# Patient Record
Sex: FEMALE | Employment: UNEMPLOYED | URBAN - METROPOLITAN AREA
[De-identification: names, ages, dates, MRNs, and addresses within clinical notes are randomized per-mention and may not be internally consistent; named-entity substitution may affect disease eponyms.]

---

## 2018-11-06 ENCOUNTER — APPOINTMENT (OUTPATIENT)
Dept: GENERAL RADIOLOGY | Age: 63
End: 2018-11-06
Attending: EMERGENCY MEDICINE
Payer: MEDICARE

## 2018-11-06 ENCOUNTER — HOSPITAL ENCOUNTER (EMERGENCY)
Age: 63
Discharge: HOME | End: 2018-11-06
Attending: EMERGENCY MEDICINE
Payer: MEDICARE

## 2018-11-06 VITALS
HEART RATE: 65 BPM | DIASTOLIC BLOOD PRESSURE: 63 MMHG | HEIGHT: 61 IN | RESPIRATION RATE: 18 BRPM | BODY MASS INDEX: 33.04 KG/M2 | TEMPERATURE: 98.1 F | WEIGHT: 175 LBS | OXYGEN SATURATION: 97 % | SYSTOLIC BLOOD PRESSURE: 140 MMHG

## 2018-11-06 DIAGNOSIS — R03.0 ELEVATED BLOOD PRESSURE READING: ICD-10-CM

## 2018-11-06 DIAGNOSIS — R07.89 ATYPICAL CHEST PAIN: ICD-10-CM

## 2018-11-06 DIAGNOSIS — M25.521 RIGHT ELBOW PAIN: Primary | ICD-10-CM

## 2018-11-06 LAB
ALBUMIN SERPL-MCNC: 4 G/DL (ref 3.4–5)
ALBUMIN/GLOB SERPL: 1.4 {RATIO} (ref 0.8–1.7)
ALP SERPL-CCNC: 109 U/L (ref 45–117)
ALT SERPL-CCNC: 46 U/L (ref 13–56)
ANION GAP SERPL CALC-SCNC: 9 MMOL/L (ref 3–18)
AST SERPL-CCNC: 47 U/L (ref 15–37)
BASOPHILS # BLD: 0 K/UL (ref 0–0.1)
BASOPHILS NFR BLD: 0 % (ref 0–2)
BILIRUB SERPL-MCNC: 0.4 MG/DL (ref 0.2–1)
BNP SERPL-MCNC: 20 PG/ML (ref 0–900)
BUN SERPL-MCNC: 14 MG/DL (ref 7–18)
BUN/CREAT SERPL: 19 (ref 12–20)
CALCIUM SERPL-MCNC: 9.3 MG/DL (ref 8.5–10.1)
CHLORIDE SERPL-SCNC: 103 MMOL/L (ref 100–108)
CO2 SERPL-SCNC: 28 MMOL/L (ref 21–32)
CREAT SERPL-MCNC: 0.72 MG/DL (ref 0.6–1.3)
DIFFERENTIAL METHOD BLD: NORMAL
EOSINOPHIL # BLD: 0.2 K/UL (ref 0–0.4)
EOSINOPHIL NFR BLD: 4 % (ref 0–5)
ERYTHROCYTE [DISTWIDTH] IN BLOOD BY AUTOMATED COUNT: 12.3 % (ref 11.6–14.5)
GLOBULIN SER CALC-MCNC: 2.9 G/DL (ref 2–4)
GLUCOSE SERPL-MCNC: 213 MG/DL (ref 74–99)
HCT VFR BLD AUTO: 42.2 % (ref 35–45)
HGB BLD-MCNC: 13.8 G/DL (ref 12–16)
LYMPHOCYTES # BLD: 2.2 K/UL (ref 0.9–3.6)
LYMPHOCYTES NFR BLD: 36 % (ref 21–52)
MCH RBC QN AUTO: 30.7 PG (ref 24–34)
MCHC RBC AUTO-ENTMCNC: 32.7 G/DL (ref 31–37)
MCV RBC AUTO: 94 FL (ref 74–97)
MONOCYTES # BLD: 0.5 K/UL (ref 0.05–1.2)
MONOCYTES NFR BLD: 9 % (ref 3–10)
NEUTS SEG # BLD: 3.1 K/UL (ref 1.8–8)
NEUTS SEG NFR BLD: 51 % (ref 40–73)
PLATELET # BLD AUTO: 242 K/UL (ref 135–420)
PMV BLD AUTO: 10.4 FL (ref 9.2–11.8)
POTASSIUM SERPL-SCNC: 3.6 MMOL/L (ref 3.5–5.5)
PROT SERPL-MCNC: 6.9 G/DL (ref 6.4–8.2)
RBC # BLD AUTO: 4.49 M/UL (ref 4.2–5.3)
SODIUM SERPL-SCNC: 140 MMOL/L (ref 136–145)
TROPONIN I SERPL-MCNC: <0.02 NG/ML (ref 0–0.04)
WBC # BLD AUTO: 6.1 K/UL (ref 4.6–13.2)

## 2018-11-06 PROCEDURE — 74011250637 HC RX REV CODE- 250/637: Performed by: PHYSICIAN ASSISTANT

## 2018-11-06 PROCEDURE — 99285 EMERGENCY DEPT VISIT HI MDM: CPT

## 2018-11-06 PROCEDURE — 85025 COMPLETE CBC W/AUTO DIFF WBC: CPT | Performed by: EMERGENCY MEDICINE

## 2018-11-06 PROCEDURE — 83880 ASSAY OF NATRIURETIC PEPTIDE: CPT | Performed by: EMERGENCY MEDICINE

## 2018-11-06 PROCEDURE — 93005 ELECTROCARDIOGRAM TRACING: CPT

## 2018-11-06 PROCEDURE — 84484 ASSAY OF TROPONIN QUANT: CPT | Performed by: EMERGENCY MEDICINE

## 2018-11-06 PROCEDURE — 94762 N-INVAS EAR/PLS OXIMTRY CONT: CPT

## 2018-11-06 PROCEDURE — 71046 X-RAY EXAM CHEST 2 VIEWS: CPT

## 2018-11-06 PROCEDURE — 80053 COMPREHEN METABOLIC PANEL: CPT | Performed by: EMERGENCY MEDICINE

## 2018-11-06 RX ORDER — GUAIFENESIN 100 MG/5ML
81 LIQUID (ML) ORAL
Status: COMPLETED | OUTPATIENT
Start: 2018-11-06 | End: 2018-11-06

## 2018-11-06 RX ADMIN — ASPIRIN 81 MG CHEWABLE TABLET 81 MG: 81 TABLET CHEWABLE at 21:40

## 2018-11-07 LAB
ATRIAL RATE: 91 BPM
CALCULATED P AXIS, ECG09: 40 DEGREES
CALCULATED R AXIS, ECG10: -3 DEGREES
CALCULATED T AXIS, ECG11: 63 DEGREES
DIAGNOSIS, 93000: NORMAL
P-R INTERVAL, ECG05: 142 MS
Q-T INTERVAL, ECG07: 362 MS
QRS DURATION, ECG06: 82 MS
QTC CALCULATION (BEZET), ECG08: 445 MS
VENTRICULAR RATE, ECG03: 91 BPM

## 2018-11-07 NOTE — ED PROVIDER NOTES
EMERGENCY DEPARTMENT HISTORY AND PHYSICAL EXAM 
 
9:38 PM 
 
 
Date: 11/6/2018 Patient Name: Alejandrina Alarcon History of Presenting Illness Chief Complaint Patient presents with  Arm Pain  
  right arm  Chest Pain  
  right sided chest pain  Headache History Provided By: Patient Chief Complaint: R elbow pain, chest pain Duration:  Hours Timing:  Acute Location: R medial aspect of elbow, R chest wall Quality: Aching Severity: Moderate Modifying Factors: worse with movement of arm Associated Symptoms: diffuse, mild HA Additional History (Context): Alejandrina Alarcon is a 61 y.o. female with hx of fibromyalgia, DM, HTN who presents with c/o R medial elbow pain radiating into R chest wall x 2 hours. Pt notes the pain is aching in nature and worse with movement. Denies diaphoresis, dyspnea, n/v, leg edema, orthopnea. Denies hx of cardiac disease, smoking hx. Notes father with hx of CAD. Also notes gradual onset of mild diffuse HA. Denies dizziness, changes in vision, numbness/tingling, weakness. Did not take any medication PTA. Takes baby ASA qday. PCP: Ravinder Mckinnon MD 
 
Current Outpatient Medications Medication Sig Dispense Refill  METFORMIN HCL (METFORMIN PO) Take  by mouth.  LEVOTHYROXINE SODIUM (LEVOTHYROXINE PO) Take  by mouth.  LISINOPRIL PO Take  by mouth.  GLYBURIDE by Does Not Apply route. Past History Past Medical History: 
Past Medical History:  
Diagnosis Date  Diabetes ([address]  Fibromyalgia   
 HTN (hypertension)  Hypothyroid Past Surgical History: 
History reviewed. No pertinent surgical history. Family History: 
History reviewed. No pertinent family history. Social History: 
Social History Tobacco Use  Smoking status: Never Smoker  Smokeless tobacco: Never Used Substance Use Topics  Alcohol use: No  
  Frequency: Never  Drug use: No  
 
 
Allergies: Allergies Allergen Reactions  Compazine [Prochlorperazine Edisylate] Angioedema  Aspirin Hives Review of Systems Review of Systems Constitutional: Negative for chills and fever. Respiratory: Negative for shortness of breath. Cardiovascular: Positive for chest pain. Gastrointestinal: Negative for abdominal pain, nausea and vomiting. Musculoskeletal: Positive for myalgias. Skin: Negative for rash. Neurological: Negative for weakness. All other systems reviewed and are negative. Physical Exam  
 
Visit Vitals /68 Pulse 82 Temp 98.1 °F (36.7 °C) Resp 15 Ht 5' 1\" (1.549 m) Wt 79.4 kg (175 lb) SpO2 97% BMI 33.07 kg/m² Physical Exam  
Constitutional: She is oriented to person, place, and time. She appears well-developed and well-nourished. No distress. HENT:  
Head: Normocephalic and atraumatic. Mouth/Throat: Oropharynx is clear and moist.  
Neck: Normal range of motion. Neck supple. Cardiovascular: Normal rate, regular rhythm, normal heart sounds and intact distal pulses. Exam reveals no gallop and no friction rub. No murmur heard. Pulmonary/Chest: Effort normal and breath sounds normal. No respiratory distress. She has no wheezes. She has no rales. She exhibits no tenderness. Abdominal: Soft. She exhibits no distension. There is no tenderness. There is no rebound and no guarding. Musculoskeletal: Normal range of motion. She exhibits no edema. Right shoulder: Normal.  
     Right elbow: She exhibits normal range of motion, no swelling, no effusion, no deformity and no laceration. Tenderness ( ) found. Medial epicondyle tenderness noted.  strength 5/5, radial pulse 2+ Neurological: She is alert and oriented to person, place, and time. No cranial nerve deficit. Coordination normal.  
Skin: Skin is warm. No rash noted. She is not diaphoretic. Nursing note and vitals reviewed. Diagnostic Study Results Labs - 
 Recent Results (from the past 12 hour(s)) EKG, 12 LEAD, INITIAL Collection Time: 11/06/18  9:20 PM  
Result Value Ref Range Ventricular Rate 91 BPM  
 Atrial Rate 91 BPM  
 P-R Interval 142 ms QRS Duration 82 ms Q-T Interval 362 ms QTC Calculation (Bezet) 445 ms Calculated P Axis 40 degrees Calculated R Axis -3 degrees Calculated T Axis 63 degrees Diagnosis Normal sinus rhythm Normal ECG When compared with ECG of 09-APR-2009 13:42, 
Borderline criteria for Anterior infarct are no longer present Borderline criteria for Inferior infarct are no longer present CBC WITH AUTOMATED DIFF Collection Time: 11/06/18  9:30 PM  
Result Value Ref Range WBC 6.1 4.6 - 13.2 K/uL  
 RBC 4.49 4.20 - 5.30 M/uL  
 HGB 13.8 12.0 - 16.0 g/dL HCT 42.2 35.0 - 45.0 % MCV 94.0 74.0 - 97.0 FL  
 MCH 30.7 24.0 - 34.0 PG  
 MCHC 32.7 31.0 - 37.0 g/dL  
 RDW 12.3 11.6 - 14.5 % PLATELET 620 849 - 666 K/uL MPV 10.4 9.2 - 11.8 FL  
 NEUTROPHILS 51 40 - 73 % LYMPHOCYTES 36 21 - 52 % MONOCYTES 9 3 - 10 % EOSINOPHILS 4 0 - 5 % BASOPHILS 0 0 - 2 %  
 ABS. NEUTROPHILS 3.1 1.8 - 8.0 K/UL  
 ABS. LYMPHOCYTES 2.2 0.9 - 3.6 K/UL  
 ABS. MONOCYTES 0.5 0.05 - 1.2 K/UL  
 ABS. EOSINOPHILS 0.2 0.0 - 0.4 K/UL  
 ABS. BASOPHILS 0.0 0.0 - 0.1 K/UL  
 DF AUTOMATED METABOLIC PANEL, COMPREHENSIVE Collection Time: 11/06/18  9:30 PM  
Result Value Ref Range Sodium 140 136 - 145 mmol/L Potassium 3.6 3.5 - 5.5 mmol/L Chloride 103 100 - 108 mmol/L  
 CO2 28 21 - 32 mmol/L Anion gap 9 3.0 - 18 mmol/L Glucose 213 (H) 74 - 99 mg/dL BUN 14 7.0 - 18 MG/DL Creatinine 0.72 0.6 - 1.3 MG/DL  
 BUN/Creatinine ratio 19 12 - 20 GFR est AA >60 >60 ml/min/1.73m2 GFR est non-AA >60 >60 ml/min/1.73m2 Calcium 9.3 8.5 - 10.1 MG/DL Bilirubin, total 0.4 0.2 - 1.0 MG/DL  
 ALT (SGPT) 46 13 - 56 U/L  
 AST (SGOT) 47 (H) 15 - 37 U/L Alk.  phosphatase 109 45 - 117 U/L  
 Protein, total 6.9 6.4 - 8.2 g/dL Albumin 4.0 3.4 - 5.0 g/dL Globulin 2.9 2.0 - 4.0 g/dL A-G Ratio 1.4 0.8 - 1.7 NT-PRO BNP Collection Time: 11/06/18  9:30 PM  
Result Value Ref Range NT pro-BNP 20 0 - 900 PG/ML  
TROPONIN I Collection Time: 11/06/18  9:30 PM  
Result Value Ref Range Troponin-I, Qt. <0.02 0.0 - 0.045 NG/ML Radiologic Studies -  
XR CHEST PA LAT    (Results Pending) RADIOLOGY FINDINGS Chest  X-ray shows no acute process Pending review by Radiologist 
Recorded by Dalia Mitchell PA-C Medical Decision Making I am the first provider for this patient. I reviewed the vital signs, available nursing notes, past medical history, past surgical history, family history and social history. Vital Signs-Reviewed the patient's vital signs. Pulse Oximetry Analysis -  100 on room air Cardiac Monitor: 
Rate: 98 Rhythm:  Normal sinus rhythm EKG: Interpreted by the EP. Time Interpreted: 2121 Rate: 91 
 Rhythm: normal sinus rhythm Interpretation: no STEMI, no ischemic changes Records Reviewed: Nursing Notes and Old Medical Records (Time of Review: 9:38 PM) ED Course: Progress Notes, Reevaluation, and Consults: 
10:09 PM: Pt resting comfortably, talking with family member. 10:12 PM Reviewed results with patient and family. Discussed need for close outpatient follow-up. Discussed strict return precautions, including shortness of breath, diaphoresis, or any other medical concerns. Provider Notes (Medical Decision Making): 62 yo F with hx of DM and HTN who presents due to R medial elbow pain with radiation into R chest wall x hours. Pain reproducible with palpation. EKG normal sinus rhythm, troponin negative. HEART score 3, low risk of MACE. Does not seem consistent with aortic pathology or PE. Pt looks well, no distress. Stable for d/c with close outpatient follow-up. Diagnosis Clinical Impression: 1. Right elbow pain 2. Atypical chest pain 3. Elevated blood pressure reading Disposition: home Follow-up Information Follow up With Specialties Details Why Contact Info [facility] EMERGENCY DEPT Emergency Medicine  If symptoms worsen [address] 
[phone] Maria Esther Mckenna MD Internal Medicine In 2 days  [address] [facility] [address] 
[phone] Melissa Ruelas,  Cardiology Schedule an appointment as soon as possible for a visit cardiologist  [address] [facility] [address] [facility] 
[phone] Medication List  
  
ASK your doctor about these medications GLYBURIDE LEVOTHYROXINE PO 
  
LISINOPRIL PO 
  
METFORMIN PO

## 2018-11-07 NOTE — DISCHARGE INSTRUCTIONS
Elevated Blood Pressure: Care Instructions  Your Care Instructions    Blood pressure is a measure of how hard the blood pushes against the walls of your arteries. It's normal for blood pressure to go up and down throughout the day. But if it stays up over time, you have high blood pressure. Two numbers tell you your blood pressure. The first number is the systolic pressure. It shows how hard the blood pushes when your heart is pumping. The second number is the diastolic pressure. It shows how hard the blood pushes between heartbeats, when your heart is relaxed and filling with blood. An ideal blood pressure in adults is less than 120/80 (say \"120 over 80\"). High blood pressure is 140/90 or higher. You have high blood pressure if your top number is 140 or higher or your bottom number is 90 or higher, or both. The main test for high blood pressure is simple, fast, and painless. To diagnose high blood pressure, your doctor will test your blood pressure at different times. After testing your blood pressure, your doctor may ask you to test it again when you are home. If you are diagnosed with high blood pressure, you can work with your doctor to make a long-term plan to manage it. Follow-up care is a key part of your treatment and safety. Be sure to make and go to all appointments, and call your doctor if you are having problems. It's also a good idea to know your test results and keep a list of the medicines you take. How can you care for yourself at home? · Do not smoke. Smoking increases your risk for heart attack and stroke. If you need help quitting, talk to your doctor about stop-smoking programs and medicines. These can increase your chances of quitting for good. · Stay at a healthy weight. · Try to limit how much sodium you eat to less than 2,300 milligrams (mg) a day. Your doctor may ask you to try to eat less than 1,500 mg a day. · Be physically active.  Get at least 30 minutes of exercise on most days of the week. Walking is a good choice. You also may want to do other activities, such as running, swimming, cycling, or playing tennis or team sports. · Avoid or limit alcohol. Talk to your doctor about whether you can drink any alcohol. · Eat plenty of fruits, vegetables, and low-fat dairy products. Eat less saturated and total fats. · Learn how to check your blood pressure at home. When should you call for help? Call your doctor now or seek immediate medical care if:  ? · Your blood pressure is much higher than normal (such as 180/110 or higher). ? · You think high blood pressure is causing symptoms such as:  ¨ Severe headache. ¨ Blurry vision. ? Watch closely for changes in your health, and be sure to contact your doctor if:  ? · You do not get better as expected. Where can you learn more? Go to http://dedraSayHello LLCdebby.info/. Enter F962 in the search box to learn more about \"Elevated Blood Pressure: Care Instructions. \"  Current as of: September 21, 2016  Content Version: 11.4  © 1885-8138 [employer]. Care instructions adapted under license by [employer] (which disclaims liability or warranty for this information). If you have questions about a medical condition or this instruction, always ask your healthcare professional. [address] any warranty or liability for your use of this information. Chest Pain: Care Instructions  Your Care Instructions    There are many things that can cause chest pain. Some are not serious and will get better on their own in a few days. But some kinds of chest pain need more testing and treatment. Your doctor may have recommended a follow-up visit in the next 8 to 12 hours. If you are not getting better, you may need more tests or treatment. Even though your doctor has released you, you still need to watch for any problems.  The doctor carefully checked you, but sometimes problems can develop later. If you have new symptoms or if your symptoms do not get better, get medical care right away. If you have worse or different chest pain or pressure that lasts more than 5 minutes or you passed out (lost consciousness), call 911 or seek other emergency help right away. A medical visit is only one step in your treatment. Even if you feel better, you still need to do what your doctor recommends, such as going to all suggested follow-up appointments and taking medicines exactly as directed. This will help you recover and help prevent future problems. How can you care for yourself at home? · Rest until you feel better. · Take your medicine exactly as prescribed. Call your doctor if you think you are having a problem with your medicine. · Do not drive after taking a prescription pain medicine. When should you call for help? Call 911 if:    · You passed out (lost consciousness).     · You have severe difficulty breathing.     · You have symptoms of a heart attack. These may include:  ? Chest pain or pressure, or a strange feeling in your chest.  ? Sweating. ? Shortness of breath. ? Nausea or vomiting. ? Pain, pressure, or a strange feeling in your back, neck, jaw, or upper belly or in one or both shoulders or arms. ? Lightheadedness or sudden weakness. ? A fast or irregular heartbeat. After you call 911, the  may tell you to chew 1 adult-strength or 2 to 4 low-dose aspirin. Wait for an ambulance. Do not try to drive yourself.    Call your doctor today if:    · You have any trouble breathing.     · Your chest pain gets worse.     · You are dizzy or lightheaded, or you feel like you may faint.     · You are not getting better as expected.     · You are having new or different chest pain. Where can you learn more? Go to http://dedra-debby.info/. Enter A120 in the search box to learn more about \"Chest Pain: Care Instructions. \"  Current as of: November 20, 2017  Content Version: 11.8  © 2697-3644 [employer]. Care instructions adapted under license by [employer] (which disclaims liability or warranty for this information). If you have questions about a medical condition or this instruction, always ask your healthcare professional. [address] any warranty or liability for your use of this information.

## 2018-11-07 NOTE — ED TRIAGE NOTES
Pt brought self to ED, ambulatory in triage for c/o right sided arm pain that shoots up into her chest that began 2 hours ago. Pt stating \"I have fibromyalgia so I have pain all the time\". Denies N/V/D, states \"I don't feel real real short of breath but I find myself taking deep breaths\".

## 2018-11-13 ENCOUNTER — OFFICE VISIT (OUTPATIENT)
Dept: CARDIOLOGY CLINIC | Age: 63
End: 2018-11-13

## 2018-11-13 VITALS
DIASTOLIC BLOOD PRESSURE: 80 MMHG | OXYGEN SATURATION: 98 % | HEART RATE: 86 BPM | SYSTOLIC BLOOD PRESSURE: 136 MMHG | WEIGHT: 178 LBS | BODY MASS INDEX: 33.63 KG/M2

## 2018-11-13 DIAGNOSIS — R07.89 OTHER CHEST PAIN: Primary | ICD-10-CM

## 2018-11-13 DIAGNOSIS — R00.2 PALPITATIONS: ICD-10-CM

## 2018-11-13 RX ORDER — NITROGLYCERIN 0.4 MG/1
0.4 TABLET SUBLINGUAL
Qty: 25 TAB | Refills: 3 | Status: SHIPPED | OUTPATIENT
Start: 2018-11-13

## 2018-11-13 NOTE — PATIENT INSTRUCTIONS
Negro French will call to schedule your testing within 24 hours. If you do not hear from her, then please call her directly at [phone].  Testing is done in [address] on [address].

## 2018-11-13 NOTE — PROGRESS NOTES
1. Have you been to the ER, urgent care clinic since your last visit? Hospitalized since your last visit? No  
 
2. Have you seen or consulted any other health care providers outside of the [address] [place] [address] since your last visit? Include any pap smears or colon screening.  No

## 2018-11-16 ENCOUNTER — HOSPITAL ENCOUNTER (OUTPATIENT)
Dept: NON INVASIVE DIAGNOSTICS | Age: 63
Discharge: HOME | End: 2018-11-16
Attending: INTERNAL MEDICINE
Payer: MEDICARE

## 2018-11-16 ENCOUNTER — HOSPITAL ENCOUNTER (OUTPATIENT)
Dept: NUCLEAR MEDICINE | Age: 63
Discharge: HOME | End: 2018-11-16
Attending: INTERNAL MEDICINE
Payer: MEDICARE

## 2018-11-16 VITALS
DIASTOLIC BLOOD PRESSURE: 84 MMHG | HEIGHT: 61 IN | BODY MASS INDEX: 33.7 KG/M2 | SYSTOLIC BLOOD PRESSURE: 157 MMHG | WEIGHT: 178.5 LBS

## 2018-11-16 DIAGNOSIS — R00.2 PALPITATIONS: ICD-10-CM

## 2018-11-16 DIAGNOSIS — R07.89 OTHER CHEST PAIN: ICD-10-CM

## 2018-11-16 LAB
ECHO PULMONARY ARTERY SYSTOLIC PRESSURE (PASP): 28 MMHG
STRESS BASELINE HR: 70 BPM
STRESS ESTIMATED WORKLOAD: 9.7 METS
STRESS EXERCISE DUR MIN: NORMAL
STRESS PEAK DIAS BP: 86 MMHG
STRESS PEAK SYS BP: 189 MMHG
STRESS PERCENT HR ACHIEVED: 106 %
STRESS POST PEAK HR: 141 BPM
STRESS RATE PRESSURE PRODUCT: NORMAL BPM*MMHG
STRESS ST DEPRESSION: 0 MM
STRESS ST ELEVATION: 0 MM
STRESS TARGET HR: 133 BPM

## 2018-11-16 PROCEDURE — 93017 CV STRESS TEST TRACING ONLY: CPT

## 2018-11-16 PROCEDURE — A9500 TC99M SESTAMIBI: HCPCS

## 2018-11-16 PROCEDURE — 93306 TTE W/DOPPLER COMPLETE: CPT

## 2018-11-16 PROCEDURE — 93226 XTRNL ECG REC<48 HR SCAN A/R: CPT

## 2018-12-18 ENCOUNTER — OFFICE VISIT (OUTPATIENT)
Dept: CARDIOLOGY CLINIC | Age: 63
End: 2018-12-18

## 2018-12-18 VITALS
OXYGEN SATURATION: 97 % | SYSTOLIC BLOOD PRESSURE: 135 MMHG | BODY MASS INDEX: 33.82 KG/M2 | DIASTOLIC BLOOD PRESSURE: 78 MMHG | HEART RATE: 98 BPM | WEIGHT: 179 LBS

## 2018-12-18 DIAGNOSIS — R07.89 OTHER CHEST PAIN: Primary | ICD-10-CM

## 2018-12-18 NOTE — PROGRESS NOTES
1. Have you been to the ER, urgent care clinic since your last visit? Hospitalized since your last visit? No     2. Have you seen or consulted any other health care providers outside of the [address] [place] [address] since your last visit? Include any pap smears or colon screening.  No

## 2018-12-18 NOTE — PROGRESS NOTES
[facility]    Ms. Calli Thayer is a 61 y.o. female with a history of diabetes, hypertension, hypothyroidism and fibromyalgia. Ms. Calli Thayer was asked to come see me for the evaluation of chest pain and palpitations initially  Underwent cardiac testing  No new symptoms since last time  No exertional symptoms. Denies any nausea, vomiting, abdominal pain, fever, chills, sputum production. No hematuria or other bleeding complaints    Past Medical History:   Diagnosis Date    Diabetes ([address]     Fibromyalgia     HTN (hypertension)     Hypothyroid     Obesity          No past surgical history on file. Current Outpatient Medications   Medication Sig    nitroglycerin (NITROSTAT) 0.4 mg SL tablet 1 Tab by SubLINGual route every five (5) minutes as needed for Chest Pain. Up to 3 doses.  METFORMIN HCL (METFORMIN PO) Take 1,000 mg by mouth two (2) times a day.  LEVOTHYROXINE SODIUM (LEVOTHYROXINE PO) Take 100 mcg by mouth daily.  LISINOPRIL PO Take 10 mg by mouth daily. No current facility-administered medications for this visit. Allergies and Sensitivities:  Allergies   Allergen Reactions    Compazine [Prochlorperazine Edisylate] Angioedema    Aspirin Hives       Family History:  No family history on file. Social History:  Social History     Tobacco Use    Smoking status: Never Smoker    Smokeless tobacco: Never Used   Substance Use Topics    Alcohol use: No     Frequency: Never    Drug use: No     She  reports that  has never smoked. she has never used smokeless tobacco.  She  reports that she does not drink alcohol. Review of Systems:  Cardiac symptoms as noted above in HPI. All others negative. Denies fatigue, malaise, skin rash, joint pain, blurring vision, photophobia, neck pain, hemoptysis, chronic cough, nausea, vomiting, hematuria, burning micturition, BRBPR, chronic headaches.     Physical Exam:  BP Readings from Last 3 Encounters:   12/18/18 135/78   11/16/18 157/84   11/13/18 136/80         Pulse Readings from Last 3 Encounters:   12/18/18 98   11/13/18 86   11/06/18 65          Wt Readings from Last 3 Encounters:   12/18/18 179 lb (81.2 kg)   11/16/18 178 lb 8 oz (81 kg)   11/13/18 178 lb (80.7 kg)       Constitutional: Oriented to person, place, and time. HENT: Head: Normocephalic and atraumatic  Neck: No JVD present. Cardiovascular: Regular rhythm. No murmur, gallop or rubs appreciated  Lung: Breath sounds normal. No respiratory distress. No ronchi or rales appreciated  Abdominal: No tenderness. No rebound and no guarding. Musculoskeletal: There is no lower extremity edema. No cynosis      Review of Data  LABS:   Lab Results   Component Value Date/Time    Sodium 140 11/06/2018 09:30 PM    Potassium 3.6 11/06/2018 09:30 PM    Chloride 103 11/06/2018 09:30 PM    CO2 28 11/06/2018 09:30 PM    Glucose 213 (H) 11/06/2018 09:30 PM    BUN 14 11/06/2018 09:30 PM    Creatinine 0.72 11/06/2018 09:30 PM     No flowsheet data found. Lab Results   Component Value Date/Time    ALT (SGPT) 46 11/06/2018 09:30 PM     Lab Results   Component Value Date/Time    Hemoglobin A1c 7.8 (H) 10/01/2013 02:01 PM       EKG (11/18) Sinus rhythm at 91 bpm. Normal EKG. ECHO (11/18)  Left Ventricle Normal cavity size and wall thickness. There is low normal systolic function. The estimated ejection fraction is 51 - 55%. Visually measured ejection fraction. No regional wall motion abnormality noted. Slight septal bounce noted. There is age-appropriate left ventricular diastolic function. Left Atrium Normal cavity size. Right Ventricle Normal cavity size and global systolic function. Right Atrium Normal size. Aortic Valve No stenosis. Mild aortic valve sclerosis with no significant stenosis. Trace aortic valve regurgitation. Mitral Valve Normal valve structure and no stenosis. Trace regurgitation.    Tricuspid Valve Normal valve structure and no stenosis. Mild tricuspid valve regurgitation. Pulmonary arterial systolic pressure is 28 mmHg. There is no evidence of pulmonary hypertension. Pulmonic Valve The pulmonic valve was not well visualized. No stenosis. Trace regurgitation. Aorta Normal aortic root, ascending aortic, and aortic arch. HOLTER (11/18)  [place] was in Sinus Rhythm. The average heart rate, excluding ectopy, was 78 BPM with a minimum of 51 BPM at 07:48 D3 and a maximum of 123 BPM at 17:22 D1. Heart beats, including ectopy, totaled [id number] beats. VENTRICULAR ECTOPICS totaled 2 averaging 0.0 per hour with 2 single, 0 paired, 0 trigeminy and 0 R on T. There were no SUPRAVENTRICULAR ECTOPICS found.     Interpretation:  1. The rhythm was sinus. 2. NJ and QRS are within normal limits. 3. (2) Single VEs. 4. Patient diary was submitted symptom noted, no patient triggered events noted. Patient reported \"Chest pain, neck pain. .. \". Reported strips showed sinus rhythm with HR less than 105 bpm.     STRESS TEST (11/18)  · Baseline ECG: Normal sinus rhythm. · Low risk Duke treadmill score. · Negative stress electrocardiogram.  · Gated SPECT: Left ventricular function post-stress was normal. Calculated ejection fraction is 66%. · Left ventricular perfusion is probably normal.  · Myocardial perfusion imaging defect 1: There is a defect that is small in size present in the apical location(s) that is more pronounced with rest than stress imaging. The defect appears to be an artifact caused by breast attenuation. · There was no convincing evidence of significant reversible defect to suggest on going major ischemia. · Myocardial perfusion imaging supports a low risk stress test.    IMPRESSION & PLAN:  Ms. Jericho Esquivel is a 61year old female with a history of diabetes, hypertension, obesity and other medical problems. Chest pain:    Likely non-cardiac etiology  Cardiac work up echo, stress, low risk in 11/18.   Reassured patient and . Hypertension:  BP today 135/78 mm Hg. Continue same. Diabetes:  Goal Hgb A1c less than 7 is recommended from a cardiovascular standpoint. She is on Metformin. I am going to defer this management to PCP. Obesity: Max weight 220 lbs. Now down to 178 lbs with diet and exercise. Continue same. Importance of diet and exercise was discussed with patient. This plan was discussed with patient who is in agreement. Thank you for allowing me to participate in patient care. Please feel free to call me if you have any question or concern. Edna Flores MD  Please note: This document has been produced using voice recognition software. Unrecognized errors in transcription may be present.

## 2019-02-14 NOTE — PROGRESS NOTES
"CC: Back pain.    HPI:   Jamilah presents today with the following.    1. Acute right-sided low back pain with right-sided sciatica  Presents with approximately 5 days of low back pain with some mild radiation of the right leg.  She denies any weakness in the extremity no loss of bowel or bladder no foot drop.  She has no fevers or chills no true flank pain.  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  Denies any previous significant back issues.      Patient Active Problem List    Diagnosis Date Noted   • Constipation 11/20/2018   • Gastroesophageal reflux disease 11/20/2018   • Oropharyngeal dysphagia 11/20/2018   • Allergic rhinitis 09/26/2016   • Pure hypercholesterolemia 08/23/2016   • Carpal tunnel syndrome        Current Outpatient Prescriptions   Medication Sig Dispense Refill   • docusate sodium (COLACE) 100 MG Cap Take 1 Cap by mouth 2 times a day as needed for Constipation. 60 Cap 2   • naproxen (NAPROSYN) 500 MG Tab Take 500 mg by mouth 2 times a day, with meals.     • polyethylene glycol 3350 (MIRALAX) Powder 0.5 tbsp PO with water QD PRn constipation (Patient not taking: Reported on 2/13/2019) 1 Bottle 11   • omeprazole (PRILOSEC) 20 MG delayed-release capsule Take 1 Cap by mouth every morning before breakfast. (Patient not taking: Reported on 2/13/2019) 90 Cap 3   • fluticasone (FLONASE) 50 MCG/ACT nasal spray Spray 1 Spray in nose PRN.     • naproxen (ALEVE) 220 MG tablet Take 440 mg by mouth as needed.       No current facility-administered medications for this visit.          Allergies as of 02/13/2019   • (No Known Allergies)        ROS: Denies Chest pain, SOB, LE edema.    /66 (BP Location: Right arm, Patient Position: Sitting)   Pulse 60   Temp 36.3 °C (97.3 °F)   Ht 1.626 m (5' 4\")   Wt 68.5 kg (151 lb)   LMP 03/01/2015   SpO2 99%   BMI 25.92 kg/m²     Physical Exam:  Gen:         Alert and oriented, No apparent " [facility] Ms. Fernando Norris is a 61year old female with a history of diabetes, hypertension, hypothyroidism and fibromyalgia. Ms. Fernando Norris was asked to come see me for the evaluation of chest pain and palpitations. Ms. Fernando Norris denies any prior history of myocardial infarction or congestive heart failure. For the last one year she has been experiencing on and off chest pressure and sensation randomly. This happens probably once a week or once every other week. She describes this sensation as a pressure sensation in the center of the chest, sometimes radiating to the right side of the jaw. This lasts usually less than 30 minutes. It feels like somebody is squeezing her chest. This is random and sometimes happens at rest, sometimes with exertion. She does have mild dyspnea on moderate exertion, especially after climbing three or four flights of stairs, which has remained stable. A few days ago she had to go to the emergency department with right sided arm and right sided chest discomfort with some right arm radiation, however she also has fibromyalgia. She did not have any left sided pain. She denies palpitations almost on a daily basis where she feels like her heart is racing out of the blue. She does not have any dizziness, presyncope or syncope. It resolves itself after a few minutes. She denies any syncopal episodes. Denies any nausea, vomiting, abdominal pain, fever, chills, sputum production. No hematuria or other bleeding complaints Past Medical History:  
Diagnosis Date  Diabetes ([facility] [address]  Fibromyalgia   
 HTN (hypertension)  Hypothyroid No past surgical history on file. Current Outpatient Medications Medication Sig  METFORMIN HCL (METFORMIN PO) Take 1,000 mg by mouth two (2) times a day.  LEVOTHYROXINE SODIUM (LEVOTHYROXINE PO) Take 100 mcg by mouth daily. distress.  Neck:        No Lymphadenopathy or Bruits.  Lungs:     Clear to auscultation bilaterally  CV:          Regular rate and rhythm. No murmurs, rubs or gallops.               Ext:          No clubbing, cyanosis, edema.      Assessment and Plan.   50 y.o. female with the following issues.    1. Acute right-sided low back pain with right-sided sciatica  No obvious alarm symptomology have recommended naproxen twice daily for the next 7 days rest have put on light duty and physical therapy.  If not improving follow-up with PCP.  - REFERRAL TO PHYSICAL THERAPY Reason for Therapy: Eval/Treat/Report        LISINOPRIL PO Take 10 mg by mouth daily. No current facility-administered medications for this visit. Allergies and Sensitivities: 
Allergies Allergen Reactions  Compazine [Prochlorperazine Edisylate] Angioedema  Aspirin Hives Family History: No family history on file. Social History: 
Social History Tobacco Use  Smoking status: Never Smoker  Smokeless tobacco: Never Used Substance Use Topics  Alcohol use: No  
  Frequency: Never  Drug use: No  
 
She  reports that  has never smoked. she has never used smokeless tobacco.  She  reports that she does not drink alcohol. Review of Systems: 
Cardiac symptoms as noted above in HPI. All others negative. Denies fatigue, malaise, skin rash, joint pain, blurring vision, photophobia, neck pain, hemoptysis, chronic cough, nausea, vomiting, hematuria, burning micturition, BRBPR, chronic headaches. Physical Exam: 
BP Readings from Last 3 Encounters:  
11/13/18 136/80  
11/06/18 140/63  
12/11/13 118/79 Pulse Readings from Last 3 Encounters:  
11/13/18 86  
11/06/18 65  
12/11/13 86 Wt Readings from Last 3 Encounters:  
11/13/18 178 lb (80.7 kg) 11/06/18 175 lb (79.4 kg) 12/11/13 190 lb (86.2 kg) Constitutional: Oriented to person, place, and time. HENT: Head: Normocephalic and atraumatic. Eyes: Conjunctivae and extraocular motions are normal.  
Neck: No JVD present. Carotid bruit is not appreciated. Cardiovascular: Regular rhythm. No murmur, gallop or rubs appreciated Lung: Breath sounds normal. No respiratory distress. No ronchi or rales appreciated Abdominal: No tenderness. No rebound and no guarding. Musculoskeletal: There is no lower extremity edema. No cynosis Lymphadenopathy:  No cervical or supraclavicular adenopathy appriciated. Neurological: No gross motor deficit noted. Skin: No visible skin rash noted. No Ear discharge noted Psychiatric: Normal mood and affect. Good distal pulse Some reproducible CP on right side of chest 
 
Review of Data LABS:  
Lab Results Component Value Date/Time Sodium 140 11/06/2018 09:30 PM  
 Potassium 3.6 11/06/2018 09:30 PM  
 Chloride 103 11/06/2018 09:30 PM  
 CO2 28 11/06/2018 09:30 PM  
 Glucose 213 (H) 11/06/2018 09:30 PM  
 BUN 14 11/06/2018 09:30 PM  
 Creatinine 0.72 11/06/2018 09:30 PM  
 
No flowsheet data found. Lab Results Component Value Date/Time ALT (SGPT) 46 11/06/2018 09:30 PM  
 
Lab Results Component Value Date/Time Hemoglobin A1c 7.8 (H) 10/01/2013 02:01 PM  
 
 
EKG (11/18) Sinus rhythm at 91 bpm. Normal EKG. ECHO 
 
STRESS TEST (EST, PHARM, NUC, ECHO etc) IMPRESSION & PLAN: 
Ms. Bird Lopez is a 61year old female with a history of diabetes, hypertension, obesity and other medical problems. Chest pain:  Ms. Bird Lopez has been experiencing some chest pain as mentioned above in  HPI for the last one year. This happens probably once a week. She also has some reproducible right sided chest pain. Considering her risk factors of diabetes, obesity, hypertension and premature coronary artery disease in the father at age 54, I believe that she should undergo risk stratification. She is already taking aspirin 81 mg OTC. Sublingual nitroglycerin will be provided. Nuclear stress test will be ordered to rule out any underlying ischemia. She was advised to go to the emergency department if she has any chest pain not resolved with nitroglycerin. I advised her to avoid any exertion until further cardiac workup is done. Hypertension: This has been happening for several months. This is happening almost once daily. Holter monitor for 48 hours will be ordered. Her thyroid status is being managed by PCP. Currently she is on Synthroid. An echocardiogram has been ordered because of her chest pain and palpitations.  
 
Diabetes:  Goal Hgb A1c less than 7 is recommended from a cardiovascular standpoint. She is on Metformin. I am going to defer this management to PCP. Hypertension:  Currently she is on Lisinopril 10 mg daily. Her blood pressure today is 136/86 mmHg. An echocardiogram has been ordered to rule out hypertensive cardiovascular heart disease. Obesity: Max weight 220 lbs. Now down to 178 lbs with diet and exercise. Importance of diet and exercise was discussed with patient. This plan was discussed with patient who is in agreement. Thank you for allowing me to participate in patient care. Please feel free to call me if you have any question or concern. Esther Dominguez MD 
Please note: This document has been produced using voice recognition software. Unrecognized errors in transcription may be present.

## 2019-09-19 ENCOUNTER — OFFICE VISIT (OUTPATIENT)
Dept: CARDIOLOGY CLINIC | Age: 64
End: 2019-09-19

## 2019-09-19 VITALS
WEIGHT: 170 LBS | OXYGEN SATURATION: 98 % | BODY MASS INDEX: 32.12 KG/M2 | HEART RATE: 81 BPM | DIASTOLIC BLOOD PRESSURE: 81 MMHG | SYSTOLIC BLOOD PRESSURE: 142 MMHG

## 2019-09-19 DIAGNOSIS — I10 ESSENTIAL HYPERTENSION WITH GOAL BLOOD PRESSURE LESS THAN 140/90: ICD-10-CM

## 2019-09-19 DIAGNOSIS — R00.2 PALPITATIONS: Primary | ICD-10-CM

## 2019-09-19 RX ORDER — DULAGLUTIDE 1.5 MG/.5ML
INJECTION, SOLUTION SUBCUTANEOUS
Refills: 3 | COMMUNITY
Start: 2019-09-07 | End: 2020-07-02 | Stop reason: ALTCHOICE

## 2019-09-19 NOTE — PROGRESS NOTES
[facility]    Ms. Connie Shaffer is a 59 y.o. female with a history of diabetes, hypertension, hypothyroidism and fibromyalgia. Ms. Connie Shaffer is here today for follow-up appointment  She has been started on new diabetes medication, Trulicity. Since starting this medication patient has experienced some more than usual fatigue. She does not report any symptoms to suggest angina or heart failure. She is able to perform activity of daily living without any complaint. She denies any palpitation, presyncope or syncope  Denies any nausea, vomiting, abdominal pain, fever, chills, sputum production. No hematuria or other bleeding complaints    Past Medical History:   Diagnosis Date    Diabetes ([facility] [address]     Fibromyalgia     HTN (hypertension)     Hypothyroid     Obesity          No past surgical history on file. Current Outpatient Medications   Medication Sig    TRULICITY 1.5 LR/2.3 mL sub-q pen INJECT 1.5 MG BENEATH THE SKIN ONCE A WEEK    nitroglycerin (NITROSTAT) 0.4 mg SL tablet 1 Tab by SubLINGual route every five (5) minutes as needed for Chest Pain. Up to 3 doses.  LEVOTHYROXINE SODIUM (LEVOTHYROXINE PO) Take 100 mcg by mouth daily.  LISINOPRIL PO Take 10 mg by mouth daily. No current facility-administered medications for this visit. Allergies and Sensitivities:  Allergies   Allergen Reactions    Compazine [Prochlorperazine Edisylate] Angioedema    Aspirin Hives       Family History:  No family history on file. Social History:  Social History     Tobacco Use    Smoking status: Never Smoker    Smokeless tobacco: Never Used   Substance Use Topics    Alcohol use: No     Frequency: Never    Drug use: No     She  reports that she has never smoked. She has never used smokeless tobacco.  She  reports that she does not drink alcohol. Review of Systems:  Cardiac symptoms as noted above in HPI. All others negative.   Denies fatigue, malaise, skin rash, joint pain, blurring vision, photophobia, neck pain, hemoptysis, chronic cough, nausea, vomiting, hematuria, burning micturition, BRBPR, chronic headaches. Physical Exam:  BP Readings from Last 3 Encounters:   09/19/19 142/81   12/18/18 135/78   11/16/18 157/84         Pulse Readings from Last 3 Encounters:   09/19/19 81   12/18/18 98   11/13/18 86          Wt Readings from Last 3 Encounters:   09/19/19 170 lb (77.1 kg)   12/18/18 179 lb (81.2 kg)   11/16/18 178 lb 8 oz (81 kg)       Constitutional: Oriented to person, place, and time. HENT: Head: Normocephalic and atraumatic  Neck: No JVD present. Cardiovascular: Regular rhythm. No murmur, gallop or rubs appreciated  Lung: Breath sounds normal. No respiratory distress. No ronchi or rales appreciated  Abdominal: No tenderness. No rebound and no guarding. Musculoskeletal: There is no lower extremity edema. No cynosis      Review of Data  LABS:   Lab Results   Component Value Date/Time    Sodium 140 11/06/2018 09:30 PM    Potassium 3.6 11/06/2018 09:30 PM    Chloride 103 11/06/2018 09:30 PM    CO2 28 11/06/2018 09:30 PM    Glucose 213 (H) 11/06/2018 09:30 PM    BUN 14 11/06/2018 09:30 PM    Creatinine 0.72 11/06/2018 09:30 PM     No flowsheet data found. Lab Results   Component Value Date/Time    ALT (SGPT) 46 11/06/2018 09:30 PM     Lab Results   Component Value Date/Time    Hemoglobin A1c 7.8 (H) 10/01/2013 02:01 PM       EKG (11/18) Sinus rhythm at 91 bpm. Normal EKG. ECHO (11/18)  Left Ventricle Normal cavity size and wall thickness. There is low normal systolic function. The estimated ejection fraction is 51 - 55%. Visually measured ejection fraction. No regional wall motion abnormality noted. Slight septal bounce noted. There is age-appropriate left ventricular diastolic function. Left Atrium Normal cavity size. Right Ventricle Normal cavity size and global systolic function. Right Atrium Normal size.    Aortic Valve No stenosis. Mild aortic valve sclerosis with no significant stenosis. Trace aortic valve regurgitation. Mitral Valve Normal valve structure and no stenosis. Trace regurgitation. Tricuspid Valve Normal valve structure and no stenosis. Mild tricuspid valve regurgitation. Pulmonary arterial systolic pressure is 28 mmHg. There is no evidence of pulmonary hypertension. Pulmonic Valve The pulmonic valve was not well visualized. No stenosis. Trace regurgitation. Aorta Normal aortic root, ascending aortic, and aortic arch. HOLTER (11/18)  Kamla Saunders was in Sinus Rhythm. The average heart rate, excluding ectopy, was 78 BPM with a minimum of 51 BPM at 07:48 D3 and a maximum of 123 BPM at 17:22 D1. Heart beats, including ectopy, totaled [id number] beats. VENTRICULAR ECTOPICS totaled 2 averaging 0.0 per hour with 2 single, 0 paired, 0 trigeminy and 0 R on T. There were no SUPRAVENTRICULAR ECTOPICS found.     Interpretation:  1. The rhythm was sinus. 2. MS and QRS are within normal limits. 3. (2) Single VEs. 4. Patient diary was submitted symptom noted, no patient triggered events noted. Patient reported \"Chest pain, neck pain. .. \". Reported strips showed sinus rhythm with HR less than 105 bpm.     STRESS TEST (11/18)  · Baseline ECG: Normal sinus rhythm. · Low risk Duke treadmill score. · Negative stress electrocardiogram.  · Gated SPECT: Left ventricular function post-stress was normal. Calculated ejection fraction is 66%. · Left ventricular perfusion is probably normal.  · Myocardial perfusion imaging defect 1: There is a defect that is small in size present in the apical location(s) that is more pronounced with rest than stress imaging. The defect appears to be an artifact caused by breast attenuation. · There was no convincing evidence of significant reversible defect to suggest on going major ischemia.   · Myocardial perfusion imaging supports a low risk stress test.    IMPRESSION & PLAN:  Ms. Evelene Galeazzi is a 61year old female with a history of diabetes, hypertension, obesity and other medical problems. Chest pain:    No complaints since last visit  Cardiac work up echo, stress, low risk in 11/18. Continue to observe    Hypertension:  BP today 142/81 mm Hg. Continue same. Has not taken her medication this morning yet. Continue lisinopril    Diabetes:  Goal Hgb A1c less than 7 is recommended from a cardiovascular standpoint. She is on Metformin. I am going to defer this management to PCP. Recently has been started on Trulicity    Obesity: Max weight 220 lbs. Now down to 178-->170 lbs with diet and exercise. Continue same. Importance of diet and exercise was discussed with patient. This plan was discussed with patient who is in agreement. Thank you for allowing me to participate in patient care. Please feel free to call me if you have any question or concern. Nelida Gonzalez MD  Please note: This document has been produced using voice recognition software. Unrecognized errors in transcription may be present.

## 2019-09-19 NOTE — PROGRESS NOTES
1. Have you been to the ER, urgent care clinic since your last visit? Hospitalized since your last visit? No     2. Have you seen or consulted any other health care providers outside of the [address] [place] [address] since your last visit? Include any pap smears or colon screening.   No

## 2020-07-02 ENCOUNTER — OFFICE VISIT (OUTPATIENT)
Dept: CARDIOLOGY CLINIC | Age: 65
End: 2020-07-02

## 2020-07-02 VITALS
WEIGHT: 168 LBS | HEART RATE: 78 BPM | HEIGHT: 61 IN | OXYGEN SATURATION: 97 % | TEMPERATURE: 97 F | DIASTOLIC BLOOD PRESSURE: 70 MMHG | BODY MASS INDEX: 31.72 KG/M2 | SYSTOLIC BLOOD PRESSURE: 132 MMHG

## 2020-07-02 DIAGNOSIS — I10 ESSENTIAL HYPERTENSION WITH GOAL BLOOD PRESSURE LESS THAN 140/90: Primary | ICD-10-CM

## 2020-07-02 RX ORDER — METFORMIN HYDROCHLORIDE 500 MG/1
TABLET ORAL 2 TIMES DAILY WITH MEALS
COMMUNITY

## 2020-07-02 NOTE — PROGRESS NOTES
Iwona Hill presents today for   Chief Complaint   Patient presents with    Follow-up       Iwona Hill preferred language for health care discussion is english/other. Is someone accompanying this pt? no    Is the patient using any DME equipment during [address]? no    Depression Screening:  3 most recent PHQ Screens 7/2/2020   Little interest or pleasure in doing things Not at all   Feeling down, depressed, irritable, or hopeless Not at all   Total Score PHQ 2 0       Learning Assessment:  No flowsheet data found. Abuse Screening:  Completed    Fall Risk  Fall Risk Assessment, last 12 mths 7/2/2020   Able to walk? Yes   Fall in past 12 months? No       Pt currently taking Anticoagulant therapy? No    Coordination of Care:  1. Have you been to the ER, urgent care clinic since your last visit? Hospitalized since your last visit? no    2. Have you seen or consulted any other health care providers outside of the [address] [place] [address] since your last visit? Include any pap smears or colon screening.  no

## 2020-07-02 NOTE — PROGRESS NOTES
[facility]    Ms. Joanna Hurd is a 72 y.o. female with a history of diabetes, hypertension, hypothyroidism and fibromyalgia. Ms. Joanna Hurd is here today for follow-up appointment  She denies any hospital admission or ER visits since. She tells me that she walks on the beach for about an hour without any symptoms that is concerning for angina or heart failure. She is taking her medications regularly. Overall she has no cardiac symptoms to report at this time. Denies any nausea, vomiting, abdominal pain, fever, chills, sputum production. No hematuria or other bleeding complaints    Past Medical History:   Diagnosis Date    [facility] ([facility] [address]     Fibromyalgia     HTN (hypertension)     Hypothyroid     Obesity          No past surgical history on file. Current Outpatient Medications   Medication Sig    metFORMIN (GLUCOPHAGE) 500 mg tablet Take  by mouth two (2) times daily (with meals).  nitroglycerin (NITROSTAT) 0.4 mg SL tablet 1 Tab by SubLINGual route every five (5) minutes as needed for Chest Pain. Up to 3 doses.  LEVOTHYROXINE SODIUM (LEVOTHYROXINE PO) Take 100 mcg by mouth daily.  LISINOPRIL PO Take 10 mg by mouth daily. No current facility-administered medications for this visit. Allergies and Sensitivities:  Allergies   Allergen Reactions    Compazine [Prochlorperazine Edisylate] Angioedema    Aspirin Hives       Family History:  No family history on file. Social History:  Social History     Tobacco Use    Smoking status: Never Smoker    Smokeless tobacco: Never Used   Substance Use Topics    Alcohol use: No     Frequency: Never    Drug use: No     She  reports that she has never smoked. She has never used smokeless tobacco.  She  reports no history of alcohol use. Review of Systems:  Cardiac symptoms as noted above in HPI. All others negative.   Denies fatigue, malaise, skin rash, joint pain, blurring vision, photophobia, neck pain, hemoptysis, chronic cough, nausea, vomiting, hematuria, burning micturition, BRBPR, chronic headaches. Physical Exam:  BP Readings from Last 3 Encounters:   07/02/20 132/70   09/19/19 142/81   12/18/18 135/78         Pulse Readings from Last 3 Encounters:   07/02/20 78   09/19/19 81   12/18/18 98          Wt Readings from Last 3 Encounters:   07/02/20 168 lb (76.2 kg)   09/19/19 170 lb (77.1 kg)   12/18/18 179 lb (81.2 kg)       Constitutional: Oriented to person, place, and time. HENT: Head: Normocephalic and atraumatic  Neck: No JVD present. Cardiovascular: Regular rhythm. No murmur, gallop or rubs appreciated  Lung: Breath sounds normal. No respiratory distress. No ronchi or rales appreciated  Abdominal: No tenderness. No rebound and no guarding. Musculoskeletal: There is no lower extremity edema. No cynosis      Review of Data  LABS:   Lab Results   Component Value Date/Time    Sodium 140 11/06/2018 09:30 PM    Potassium 3.6 11/06/2018 09:30 PM    Chloride 103 11/06/2018 09:30 PM    CO2 28 11/06/2018 09:30 PM    Glucose 213 (H) 11/06/2018 09:30 PM    BUN 14 11/06/2018 09:30 PM    Creatinine 0.72 11/06/2018 09:30 PM     No flowsheet data found. Lab Results   Component Value Date/Time    ALT (SGPT) 46 11/06/2018 09:30 PM     Lab Results   Component Value Date/Time    Hemoglobin A1c 7.8 (H) 10/01/2013 02:01 PM       EKG (11/18) Sinus rhythm at 91 bpm. Normal EKG. ECHO (11/18)  Left Ventricle Normal cavity size and wall thickness. There is low normal systolic function. The estimated ejection fraction is 51 - 55%. Visually measured ejection fraction. No regional wall motion abnormality noted. Slight septal bounce noted. There is age-appropriate left ventricular diastolic function. Left Atrium Normal cavity size. Right Ventricle Normal cavity size and global systolic function. Right Atrium Normal size. Aortic Valve No stenosis.  Mild aortic valve sclerosis with no significant stenosis. Trace aortic valve regurgitation. Mitral Valve Normal valve structure and no stenosis. Trace regurgitation. Tricuspid Valve Normal valve structure and no stenosis. Mild tricuspid valve regurgitation. Pulmonary arterial systolic pressure is 28 mmHg. There is no evidence of pulmonary hypertension. Pulmonic Valve The pulmonic valve was not well visualized. No stenosis. Trace regurgitation. Aorta Normal aortic root, ascending aortic, and aortic arch. HOLTER (11/18)  Niranjan Wolf was in Sinus Rhythm. The average heart rate, excluding ectopy, was 78 BPM with a minimum of 51 BPM at 07:48 D3 and a maximum of 123 BPM at 17:22 D1. Heart beats, including ectopy, totaled [id number] beats. VENTRICULAR ECTOPICS totaled 2 averaging 0.0 per hour with 2 single, 0 paired, 0 trigeminy and 0 R on T. There were no SUPRAVENTRICULAR ECTOPICS found.     Interpretation:  1. The rhythm was sinus. 2. IL and QRS are within normal limits. 3. (2) Single VEs. 4. Patient diary was submitted symptom noted, no patient triggered events noted. Patient reported \"Chest pain, neck pain. .. \". Reported strips showed sinus rhythm with HR less than 105 bpm.     STRESS TEST (11/18)  · Baseline ECG: Normal sinus rhythm. · Low risk Duke treadmill score. · Negative stress electrocardiogram.  · Gated SPECT: Left ventricular function post-stress was normal. Calculated ejection fraction is 66%. · Left ventricular perfusion is probably normal.  · Myocardial perfusion imaging defect 1: There is a defect that is small in size present in the apical location(s) that is more pronounced with rest than stress imaging. The defect appears to be an artifact caused by breast attenuation. · There was no convincing evidence of significant reversible defect to suggest on going major ischemia.   · Myocardial perfusion imaging supports a low risk stress test.    IMPRESSION & PLAN:  Ms. Joanna Hurd is a 61year old female with a history of diabetes, hypertension, obesity and other medical problems. Chest pain:    No complaints since last visit  Cardiac work up echo, stress, low risk in 11/18. Continue to observe. Denies any use of nitroglycerin since last time    Hypertension:  BP today 132/70 mm Hg. Continue same. Diabetes:  Goal Hgb A1c less than 7 is recommended from a cardiovascular standpoint. She is on Metformin. I am going to defer this management to PCP. Recently has been started on Trulicity    Obesity: Max weight 220 lbs. Now down to 178-->168 lbs with diet and exercise. Continue same. This plan was discussed with patient who is in agreement. Thank you for allowing me to participate in patient care. Please feel free to call me if you have any question or concern. Hodan Mahajan MD  Please note: This document has been produced using voice recognition software. Unrecognized errors in transcription may be present.

## 2020-09-08 ENCOUNTER — CLINICAL SUPPORT (OUTPATIENT)
Dept: SURGERY | Age: 65
End: 2020-09-08

## 2020-09-08 VITALS — BODY MASS INDEX: 30.96 KG/M2 | TEMPERATURE: 97.8 F | RESPIRATION RATE: 18 BRPM | HEIGHT: 61 IN | WEIGHT: 164 LBS

## 2020-09-08 DIAGNOSIS — Z12.11 ENCOUNTER FOR SCREENING COLONOSCOPY: Primary | ICD-10-CM

## 2020-09-08 NOTE — PROGRESS NOTES
Colon Screen    Patient: Lita Go MRN: [id number]  SSN: [id number]    YOB: 1955  Age: 72 y.o. Sex: female        Subjective:   Lita Go was referred by Dr. May ref. provider found. PCP is Abdi Stout MD.  Patient referred for colonoscopy for   Screening colonoscopy. Patient denies rectal pain or bleeding. Abdominal surgeries as described below, specifically cholecystectomy, hernia repair insect bite debridement Family history as described below, specifically none. Last colonoscopy was 15 years ago does not remember who did procedure. Allergies   Allergen Reactions    Compazine [Prochlorperazine Edisylate] Angioedema    Aspirin Hives       Past Medical History:   Diagnosis Date    Diabetes ([address]     Fibromyalgia     HTN (hypertension)     Hypothyroid     Obesity      Past Surgical History:   Procedure Laterality Date    HX CHOLECYSTECTOMY      HX COLONOSCOPY      age 48    HX GYN      colposcopy and portion cervix removed    HX HEENT      tonsillectomy    HX HERNIA REPAIR      abdominal      No family history on file. Social History     Tobacco Use    Smoking status: Never Smoker    Smokeless tobacco: Never Used   Substance Use Topics    Alcohol use: No     Frequency: Never      Prior to Admission medications    Medication Sig Start Date End Date Taking? Authorizing Provider   metFORMIN (GLUCOPHAGE) 500 mg tablet Take  by mouth two (2) times daily (with meals). Yes Provider, Historical   nitroglycerin (NITROSTAT) 0.4 mg SL tablet 1 Tab by SubLINGual route every five (5) minutes as needed for Chest Pain. Up to 3 doses. 11/13/18  Yes Shahida Barajas MD   LEVOTHYROXINE SODIUM (LEVOTHYROXINE PO) Take 100 mcg by mouth daily. Yes Delgado Vo MD   LISINOPRIL PO Take 10 mg by mouth daily. Yes Other, MD Delgado          Review of Systems:  Review of Systems   Constitutional: Negative. HENT: Negative. Eyes: Negative. Respiratory: Negative.     Cardiovascular: Negative. Gastrointestinal: Positive for diarrhea. Negative for abdominal pain, blood in stool, constipation, heartburn, melena, nausea and vomiting. Musculoskeletal: Positive for back pain, joint pain, myalgias and neck pain. Negative for falls. Skin: Negative. Neurological: Negative. Endo/Heme/Allergies: Negative. Psychiatric/Behavioral: Negative.           Risks colonoscopy described- colon injury, missed lesion, anesthesia problems, bleeding       Richard Flood RN  September 8, 2020  11:06 AM

## 2020-09-30 ENCOUNTER — OFFICE VISIT (OUTPATIENT)
Dept: VASCULAR SURGERY | Age: 65
End: 2020-09-30
Payer: MEDICARE

## 2020-09-30 VITALS
OXYGEN SATURATION: 97 % | SYSTOLIC BLOOD PRESSURE: 160 MMHG | HEART RATE: 84 BPM | DIASTOLIC BLOOD PRESSURE: 90 MMHG | RESPIRATION RATE: 20 BRPM

## 2020-09-30 DIAGNOSIS — I83.813 VARICOSE VEINS OF BOTH LOWER EXTREMITIES WITH PAIN: Primary | ICD-10-CM

## 2020-09-30 PROCEDURE — G8417 CALC BMI ABV UP PARAM F/U: HCPCS | Performed by: SURGERY

## 2020-09-30 PROCEDURE — G8536 NO DOC ELDER MAL SCRN: HCPCS | Performed by: SURGERY

## 2020-09-30 PROCEDURE — 99203 OFFICE O/P NEW LOW 30 MIN: CPT | Performed by: SURGERY

## 2020-09-30 PROCEDURE — 3017F COLORECTAL CA SCREEN DOC REV: CPT | Performed by: SURGERY

## 2020-09-30 PROCEDURE — 1101F PT FALLS ASSESS-DOCD LE1/YR: CPT | Performed by: SURGERY

## 2020-09-30 PROCEDURE — G9899 SCRN MAM PERF RSLTS DOC: HCPCS | Performed by: SURGERY

## 2020-09-30 PROCEDURE — G8400 PT W/DXA NO RESULTS DOC: HCPCS | Performed by: SURGERY

## 2020-09-30 PROCEDURE — 1090F PRES/ABSN URINE INCON ASSESS: CPT | Performed by: SURGERY

## 2020-09-30 PROCEDURE — G8427 DOCREV CUR MEDS BY ELIG CLIN: HCPCS | Performed by: SURGERY

## 2020-09-30 PROCEDURE — G8510 SCR DEP NEG, NO PLAN REQD: HCPCS | Performed by: SURGERY

## 2020-09-30 NOTE — LETTER
10/14/20 Patient: Álavro Gan YOB: 1955 Date of Visit: 9/30/2020 Nel Roa MD 
[address] [facility] [address] VIA Facsimile: [phone] Dear Nel Roa MD, Thank you for referring Ms. Álvaro Gan to [address] [place] [address] [facility] for evaluation. My notes for this consultation are attached. If you have questions, please do not hesitate to call me. I look forward to following your patient along with you.  
 
 
Sincerely, 
 
Brandon Red MD

## 2020-10-02 ENCOUNTER — HOSPITAL ENCOUNTER (OUTPATIENT)
Dept: LAB | Age: 65
Discharge: HOME | End: 2020-10-02
Payer: MEDICARE

## 2020-10-02 PROCEDURE — 87635 SARS-COV-2 COVID-19 AMP PRB: CPT

## 2020-10-03 LAB — SARS-COV-2, COV2NT: NOT DETECTED

## 2020-10-06 ENCOUNTER — ANESTHESIA EVENT (OUTPATIENT)
Dept: ENDOSCOPY | Age: 65
End: 2020-10-06
Payer: MEDICARE

## 2020-10-07 ENCOUNTER — HOSPITAL ENCOUNTER (OUTPATIENT)
Age: 65
Setting detail: OUTPATIENT SURGERY
Discharge: HOME | End: 2020-10-07
Attending: COLON & RECTAL SURGERY | Admitting: COLON & RECTAL SURGERY
Payer: MEDICARE

## 2020-10-07 ENCOUNTER — ANESTHESIA (OUTPATIENT)
Dept: ENDOSCOPY | Age: 65
End: 2020-10-07
Payer: MEDICARE

## 2020-10-07 VITALS
RESPIRATION RATE: 15 BRPM | BODY MASS INDEX: 31.41 KG/M2 | HEART RATE: 76 BPM | DIASTOLIC BLOOD PRESSURE: 58 MMHG | HEIGHT: 60 IN | SYSTOLIC BLOOD PRESSURE: 130 MMHG | OXYGEN SATURATION: 97 % | WEIGHT: 160 LBS | TEMPERATURE: 98.4 F

## 2020-10-07 DIAGNOSIS — I87.2 VENOUS (PERIPHERAL) INSUFFICIENCY: Primary | ICD-10-CM

## 2020-10-07 DIAGNOSIS — I87.2 VENOUS (PERIPHERAL) INSUFFICIENCY: ICD-10-CM

## 2020-10-07 LAB — GLUCOSE BLD STRIP.AUTO-MCNC: 125 MG/DL (ref 70–110)

## 2020-10-07 PROCEDURE — 77030013995 HC SNR POLYP ENDOSC OCOA -A: Performed by: COLON & RECTAL SURGERY

## 2020-10-07 PROCEDURE — 88305 TISSUE EXAM BY PATHOLOGIST: CPT

## 2020-10-07 PROCEDURE — 2709999900 HC NON-CHARGEABLE SUPPLY: Performed by: COLON & RECTAL SURGERY

## 2020-10-07 PROCEDURE — 76040000007: Performed by: COLON & RECTAL SURGERY

## 2020-10-07 PROCEDURE — 76060000032 HC ANESTHESIA 0.5 TO 1 HR: Performed by: COLON & RECTAL SURGERY

## 2020-10-07 PROCEDURE — 77030021593 HC FCPS BIOP ENDOSC BSC -A: Performed by: COLON & RECTAL SURGERY

## 2020-10-07 PROCEDURE — 82962 GLUCOSE BLOOD TEST: CPT

## 2020-10-07 PROCEDURE — 45388 COLONOSCOPY W/ABLATION: CPT | Performed by: COLON & RECTAL SURGERY

## 2020-10-07 PROCEDURE — 74011250636 HC RX REV CODE- 250/636: Performed by: NURSE ANESTHETIST, CERTIFIED REGISTERED

## 2020-10-07 PROCEDURE — 45380 COLONOSCOPY AND BIOPSY: CPT | Performed by: COLON & RECTAL SURGERY

## 2020-10-07 PROCEDURE — 45385 COLONOSCOPY W/LESION REMOVAL: CPT | Performed by: COLON & RECTAL SURGERY

## 2020-10-07 RX ORDER — PROPOFOL 10 MG/ML
INJECTION, EMULSION INTRAVENOUS AS NEEDED
Status: DISCONTINUED | OUTPATIENT
Start: 2020-10-07 | End: 2020-10-07 | Stop reason: HOSPADM

## 2020-10-07 RX ORDER — SODIUM CHLORIDE, SODIUM LACTATE, POTASSIUM CHLORIDE, CALCIUM CHLORIDE 600; 310; 30; 20 MG/100ML; MG/100ML; MG/100ML; MG/100ML
50 INJECTION, SOLUTION INTRAVENOUS CONTINUOUS
Status: DISCONTINUED | OUTPATIENT
Start: 2020-10-07 | End: 2020-10-07 | Stop reason: HOSPADM

## 2020-10-07 RX ORDER — FAMOTIDINE 20 MG/1
20 TABLET, FILM COATED ORAL ONCE
Status: DISCONTINUED | OUTPATIENT
Start: 2020-10-07 | End: 2020-10-07 | Stop reason: HOSPADM

## 2020-10-07 RX ORDER — INSULIN LISPRO 100 [IU]/ML
INJECTION, SOLUTION INTRAVENOUS; SUBCUTANEOUS ONCE
Status: DISCONTINUED | OUTPATIENT
Start: 2020-10-07 | End: 2020-10-07 | Stop reason: HOSPADM

## 2020-10-07 RX ADMIN — SODIUM CHLORIDE, SODIUM LACTATE, POTASSIUM CHLORIDE, AND CALCIUM CHLORIDE 50 ML/HR: 600; 310; 30; 20 INJECTION, SOLUTION INTRAVENOUS at 10:02

## 2020-10-07 RX ADMIN — PROPOFOL 100 MG: 10 INJECTION, EMULSION INTRAVENOUS at 10:36

## 2020-10-07 RX ADMIN — PROPOFOL 150 MG: 10 INJECTION, EMULSION INTRAVENOUS at 10:41

## 2020-10-07 RX ADMIN — PROPOFOL 50 MG: 10 INJECTION, EMULSION INTRAVENOUS at 10:28

## 2020-10-07 RX ADMIN — PROPOFOL 100 MG: 10 INJECTION, EMULSION INTRAVENOUS at 10:18

## 2020-10-07 RX ADMIN — PROPOFOL 50 MG: 10 INJECTION, EMULSION INTRAVENOUS at 10:25

## 2020-10-07 NOTE — DISCHARGE INSTRUCTIONS
Patient Education   FOLLOW UP VISIT Appointment in: Other (Specify) No aspirin or ibuprofen (e.g. Aleve, Motrin, Advil) for 5 days. Repeat colonoscopy in 5 year(s). Colon Polyps: Care Instructions  Your Care Instructions     Colon polyps are growths in the colon or the rectum. The cause of most colon polyps is not known, and most people who get them do not have any problems. But a certain kind can turn into cancer. For this reason, regular testing for colon polyps is important for people as they get older. It is also important for anyone who has an increased risk for colon cancer. Polyps are usually found through routine colon cancer screening tests. Although most colon polyps are not cancerous, they are usually removed and then tested for cancer. Screening for colon cancer saves lives because the cancer can usually be cured if it is caught early. If you have a polyp that is the type that can turn into cancer, you may need more tests to examine your entire colon. The doctor will remove any other polyps that he or she finds, and you will be tested more often. Follow-up care is a key part of your treatment and safety. Be sure to make and go to all appointments, and call your doctor if you are having problems. It's also a good idea to know your test results and keep a list of the medicines you take. How can you care for yourself at home? Regular exams to look for colon polyps are the best way to prevent polyps from turning into colon cancer. These can include stool tests, sigmoidoscopy, colonoscopy, and CT colonography. Talk with your doctor about a testing schedule that is right for you. To prevent polyps  There is no home treatment that can prevent colon polyps. But these steps may help lower your risk for cancer. · Stay active. Being active can help you get to and stay at a healthy weight. Try to exercise on most days of the week. Walking is a good choice. · Eat well.  Choose a variety of vegetables, fruits, legumes (such as peas and beans), fish, poultry, and whole grains. · Do not smoke. If you need help quitting, talk to your doctor about stop-smoking programs and medicines. These can increase your chances of quitting for good. · If you drink alcohol, limit how much you drink. Limit alcohol to 2 drinks a day for men and 1 drink a day for women. When should you call for help? Call your doctor now or seek immediate medical care if:    · You have severe belly pain.     · Your stools are maroon or very bloody. Watch closely for changes in your health, and be sure to contact your doctor if:    · You have a fever.     · You have nausea or vomiting.     · You have a change in bowel habits (new constipation or diarrhea).     · Your symptoms get worse or are not improving as expected. Where can you learn more? Go to http://www.hidalgo.com/  Enter C571 in the search box to learn more about \"Colon Polyps: Care Instructions. \"  Current as of: April 29, 2020               Content Version: 12.6  © 6531-8942 [employer]. Care instructions adapted under license by [employer] (which disclaims liability or warranty for this information). If you have questions about a medical condition or this instruction, always ask your healthcare professional. [address] any warranty or liability for your use of this information. Colonoscopy: What to Expect at [address] [place] [address]  After you have a colonoscopy, you will stay at the clinic for 1 to 2 hours until the medicines wear off. Then you can go home. But you will need to arrange for a ride. Your doctor will tell you when you can eat and do your other usual activities. Your doctor will talk to you about when you will need your next colonoscopy. Your doctor can help you decide how often you need to be checked. This will depend on the results of your test and your risk for colorectal cancer.   After the test, you may be bloated or have gas pains. You may need to pass gas. If a biopsy was done or a polyp was removed, you may have streaks of blood in your stool (feces) for a few days. This care sheet gives you a general idea about how long it will take for you to recover. But each person recovers at a different pace. Follow the steps below to get better as quickly as possible. How can you care for yourself at home? Activity  · Rest when you feel tired. · You can do your normal activities when it feels okay to do so. Diet  · Follow your doctor's directions for eating. · Unless your doctor has told you not to, drink plenty of fluids. This helps to replace the fluids that were lost during the colon prep. · Do not drink alcohol. Medicines  · If polyps were removed or a biopsy was done during the test, your doctor may tell you not to take aspirin or other anti-inflammatory medicines for a few days. These include ibuprofen (Advil, Motrin) and naproxen (Aleve). Other instructions  · For your safety, do not drive or operate machinery until the medicine wears off and you can think clearly. Your doctor may tell you not to drive or operate machinery until the day after your test.  · Do not sign legal documents or make major decisions until the medicine wears off and you can think clearly. The anesthesia can make it hard for you to fully understand what you are agreeing to. Follow-up care is a key part of your treatment and safety. Be sure to make and go to all appointments, and call your doctor if you are having problems. It's also a good idea to know your test results and keep a list of the medicines you take. When should you call for help? Call 911 anytime you think you may need emergency care. For example, call if:  · You passed out (lost consciousness). · You pass maroon or bloody stools. · You have severe belly pain.   Call your doctor now or seek immediate medical care if:  · Your stools are black and tarlike. · Your stools have streaks of blood, but you did not have a biopsy or any polyps removed. · You have belly pain, or your belly is swollen and firm. · You vomit. · You have a fever. · You are very dizzy. Watch closely for changes in your health, and be sure to contact your doctor if you have any problems. Where can you learn more? Go to [employer]ove[employer]  Enter E264 in the search box to learn more about \"Colonoscopy: What to Expect at Home. \"   © 8486-8393 [employer]wise[employer]. Care instructions adapted under license by [facility] (which disclaims liability or warranty for this information). This care instruction is for use with your licensed healthcare professional. If you have questions about a medical condition or this instruction, always ask your healthcare professional. [address] any warranty or liability for your use of this information. Content Version: 43.2.426999; Current as of: November 14, 2014      DISCHARGE SUMMARY from Nurse     POST-PROCEDURE INSTRUCTIONS:    Call your Physician if you:  ? Observe any excess bleeding. ? Develop a temperature over 100.5o F.  ? Experience abdominal, shoulder or chest pain. ? Notice any signs of decreased circulation or nerve impairment to an extremity such as a change in color, persistent numbness, tingling, coldness or increase in pain. ? Vomit blood or you have nausea and vomiting lasting longer than 4 hours. ? Are unable to take medications. ? Are unable to urinate within 8 hours after discharge following general anesthesia or intravenous sedation. For the next 24 hours after receiving general anesthesia or intravenous sedation, or while taking prescription Narcotics, limit your activities:  ? Do NOT drive a motor vehicle, operate hazard machinery or power tools, or perform tasks that require coordination.   The medication you received during your procedure may have some effect on your mental awareness. ? Do NOT make important personal or business decisions. The medication you received during your procedure may have some effect on your mental awareness. ? Do NOT drink alcoholic beverages. These drinks do not mix well with the medications that have been given to you. ? Upon discharge from the hospital, you must be accompanied by a responsible adult. ? Resume your diet as directed by your physician. ? Resume medications as your physician has prescribed. ? Please give a list of your current medications to your Primary Care Provider. ? Please update this list whenever your medications are discontinued, doses are changed, or new medications (including over-the-counter products) are added. ? Please carry medication information at all times in case of emergency situations. These are general instructions for a healthy lifestyle:    No smoking/ No tobacco products/ Avoid exposure to second hand smoke.  Surgeon General's Warning:  Quitting smoking now greatly reduces serious risk to your health. Obesity, smoking, and a sedentary lifestyle greatly increase your risk for illness.  A healthy diet, regular physical exercise & weight monitoring are important for maintaining a healthy lifestyle   You may be retaining fluid if you have a history of heart failure or if you experience any of the following symptoms:  Weight gain of 3 pounds or more overnight or 5 pounds in a week, increased swelling in our hands or feet or shortness of breath while lying flat in bed. Please call your doctor as soon as you notice any of these symptoms; do not wait until your next office visit. Recognize signs and symptoms of STROKE:  F  -  Face looks uneven  A  -  Arms unable to move or move unevenly  S  -  Speech slurred or non-existent  T  -  Time to call 911 - as soon as signs and symptoms begin - DO NOT go back to bed or wait to see If you get better - TIME IS BRAIN.     Colorectal Screening   Colorectal cancer almost always develops from precancerous polyps (abnormal growths) in the colon or rectum. Screening tests can find precancerous polyps, so that they can be removed before they turn into cancer. Screening tests can also find colorectal cancer early, when treatment works best.  Sonya García Speak with your physician about when you should begin screening and how often you should be tested. Additional Information    If you have questions, please call [phone]. Remember, [employer] is NOT to be used for urgent needs. For medical emergencies, dial 911. Educational references and/or instructions provided during this visit included:    see attachments      APPOINTMENTS:    Please make a follow-up appointment with your physician. Discharge information has been reviewed with the patient and responsible party. The patient and responsible party verbalized understanding.

## 2020-10-07 NOTE — PROGRESS NOTES
Mame Mixon    Chief Complaint   Patient presents with    New Patient    Varicose Veins       History and Physical    Ms. Jose Barroso is a 70-year-old female referred to the office for evaluation of her symptomatic varicose veins left worse than right. She states she has tried compression stockings in the past but not sure that they fit her correctly. She states that she has large varicose veins in her legs left worse than right that becomes full and uncomfortable. She has been for at least 10 years. She denies any ulcers. Does have some slight swelling she states. Her base symptoms are discomfort in her legs after being her feet for long period of time and aching and burning her leg especially around where the veins are located. Past Medical History:   Diagnosis Date    Diabetes ([address]     Fibromyalgia     HTN (hypertension)     Hypothyroid     Nausea & vomiting     Obesity     Varicose veins of both lower extremities      There is no problem list on file for this patient. Past Surgical History:   Procedure Laterality Date    COLONOSCOPY N/A 10/7/2020    COLONOSCOPY, biopsy, polypectomies performed by Sandrine [facility] at [facility]    HX CHOLECYSTECTOMY      HX COLONOSCOPY      age 48   [facility]      colposcopy and portion cervix removed    HX HEENT      tonsillectomy    HX HERNIA REPAIR      abdominal     Current Outpatient Medications   Medication Sig Dispense Refill    metFORMIN (GLUCOPHAGE) 500 mg tablet Take  by mouth two (2) times daily (with meals).  nitroglycerin (NITROSTAT) 0.4 mg SL tablet 1 Tab by SubLINGual route every five (5) minutes as needed for Chest Pain. Up to 3 doses. 25 Tab 3    LEVOTHYROXINE SODIUM (LEVOTHYROXINE PO) Take 100 mcg by mouth daily.  LISINOPRIL PO Take 10 mg by mouth daily.        Allergies   Allergen Reactions    Compazine [Prochlorperazine Edisylate] Angioedema    Aspirin Hives     Social History     Socioeconomic History    Marital status:      Spouse name: Not on file    Number of children: Not on file    Years of education: Not on file    Highest education level: Not on file   Occupational History    Not on file   Social Needs    Financial resource strain: Not on file    Food insecurity     Worry: Not on file     Inability: Not on file    Transportation needs     Medical: Not on file     Non-medical: Not on file   Tobacco Use    Smoking status: Never Smoker    Smokeless tobacco: Never Used   Substance and Sexual Activity    Alcohol use: No     Frequency: Never    Drug use: No    Sexual activity: Not on file   Lifestyle    Physical activity     Days per week: Not on file     Minutes per session: Not on file    Stress: Not on file   Relationships    Social connections     Talks on phone: Not on file     Gets together: Not on file     Attends [employer] service: Not on file     Active member of club or organization: Not on file     Attends meetings of clubs or organizations: Not on file     Relationship status: Not on file    Intimate partner violence     Fear of current or ex partner: Not on file     Emotionally abused: Not on file     Physically abused: Not on file     Forced sexual activity: Not on file   Other Topics Concern    Not on file   Social History Narrative    Not on file      No family history on file. Review of Systems    Review of Systems   Constitutional: Negative for chills, diaphoresis, fever, malaise/fatigue and weight loss. HENT: Negative for hearing loss and sore throat. Eyes: Negative for blurred vision, photophobia and redness. Respiratory: Negative for cough, hemoptysis, shortness of breath and wheezing. Cardiovascular: Positive for leg swelling. Negative for chest pain, palpitations and orthopnea. Gastrointestinal: Negative for abdominal pain, blood in stool, constipation, diarrhea, heartburn, nausea and vomiting.    Genitourinary: Negative for dysuria, frequency, hematuria and urgency. Musculoskeletal: Negative for back pain and myalgias. Skin: Negative for itching and rash. Neurological: Negative for dizziness, speech change, focal weakness, weakness and headaches. Endo/Heme/Allergies: Does not bruise/bleed easily. Psychiatric/Behavioral: Negative for depression and suicidal ideas. Physical Exam:    Visit Vitals  BP (!) 160/90 (BP 1 Location: Left arm, BP Patient Position: Sitting)   Pulse 84   Resp 20   SpO2 97%      Physical Examination: General appearance - alert, well appearing, and in no distress  Mental status - alert, oriented to person, place, and time  Eyes - sclera anicteric, left eye normal, right eye normal  Ears - right ear normal, left ear normal  Nose - normal and patent, no erythema, discharge or polyps  Mouth - mucous membranes moist, pharynx normal without lesions  Neck - supple, no significant adenopathy  Lymphatics - no palpable lymphadenopathy  Chest - clear to auscultation, no wheezes, rales or rhonchi, symmetric air entry  Heart - normal rate and regular rhythm  Abdomen - soft, nontender, nondistended, no masses or organomegaly  Extremities -bilateral lower extremities warm well perfused. Varicose veins prominent bilateral lower extremities anterior and posterior calf region. Left worse than right. Impression and Plan:    ICD-10-CM ICD-9-CM    1. Varicose veins of both lower extremities with pain  I83.813 454.8      No orders of the defined types were placed in this encounter. Follow-up and Dispositions    · Return for Vascular labs. Ms. Marcelino and I discussed her symptomatic varicose veins and importance of compression stockings for venous disease. I explained that I believe she may have venous reflux disease and we talked about the pathophysiology of this. We discussed importance of regular exercise elevation as well as compression.   I have ordered her a venous reflux study of her left lower extremity we will obtain this and see her back in follow-up to see her symptoms are doing with compression as well as a see if she has had any evidence of reflux disease it may be treatable. The treatment plan was reviewed with the patient in detail. The patient voiced understanding of this plan and all questions and concerns were addressed. The patient agrees with this plan. We discussed the signs and symptoms that would require earlier attention or intervention. The patient was given educational material related to his/her visit and the patient has voiced understanding of the material.     I appreciate the opportunity to participate in the care of your patient. I will be sure to keep you informed of any subsequent changes in the treatment plan. If you have any questions or concerns, please feel free to contact me. Geoffrey Lentz MD    PLEASE NOTE:  This document has been produced using voice recognition software. Unrecognized errors in transcription may be present.

## 2020-10-07 NOTE — H&P
HPI: Bonnie Mittal is a 72 y.o. female presenting with chief complain of need for crc screen    Past Medical History:   Diagnosis Date    Diabetes ([address]     Fibromyalgia     HTN (hypertension)     Hypothyroid     Nausea & vomiting     Obesity     Varicose veins of both lower extremities        Past Surgical History:   Procedure Laterality Date    HX CHOLECYSTECTOMY      HX COLONOSCOPY      age 48   [facility]      colposcopy and portion cervix removed    HX HEENT      tonsillectomy    HX HERNIA REPAIR      abdominal       History reviewed. No pertinent family history. Social History     Socioeconomic History    Marital status:      Spouse name: Not on file    Number of children: Not on file    Years of education: Not on file    Highest education level: Not on file   Tobacco Use    Smoking status: Never Smoker    Smokeless tobacco: Never Used   Substance and Sexual Activity    Alcohol use: No     Frequency: Never    Drug use: No       Review of Systems - neg    Outpatient Medications Marked as Taking for the 10/7/20 encounter [facility] Encounter)   Medication Sig Dispense Refill    metFORMIN (GLUCOPHAGE) 500 mg tablet Take  by mouth two (2) times daily (with meals).  LEVOTHYROXINE SODIUM (LEVOTHYROXINE PO) Take 100 mcg by mouth daily.  LISINOPRIL PO Take 10 mg by mouth daily. Allergies   Allergen Reactions    Compazine [Prochlorperazine Edisylate] Angioedema    Aspirin Hives       Vitals:    09/29/20 1335 10/01/20 1222 10/07/20 0956   BP:   (!) 145/75   Pulse:   81   Resp:   14   Temp:   98.4 °F (36.9 °C)   SpO2:   100%   Weight: 74.4 kg (164 lb) 72.6 kg (160 lb)    Height:  5' (1.524 m)        Physical Exam  Constitutional:       Appearance: She is well-developed. HENT:      Head: Normocephalic and atraumatic. Eyes:      Conjunctiva/sclera: Conjunctivae normal.   Abdominal:      General: There is no distension. Palpations: Abdomen is soft. Tenderness:  There is no abdominal tenderness. Musculoskeletal: Normal range of motion. Lymphadenopathy:      Cervical: No cervical adenopathy. Skin:     General: Skin is warm and dry. Findings: No rash. Neurological:      Sensory: No sensory deficit. Psychiatric:         Speech: Speech normal.         Assessment / Plan    colonoscopy    The diagnoses and plan were discussed with the patient. All questions answered. Plan of care agreed to by all concerned.

## 2020-10-07 NOTE — PROCEDURES
[facility] [employer] [facility]  [facility] [address], [address] [facility],[address]   [place], [address]  [phone]                    Colonoscopy Procedure Note      Álvaro Gan  1955  [id number]                Date of Procedure: 10/7/2020    Preoperative diagnosis: Colon cancer Screening:   Z12.11    Postoperative diagnosis: Polyps of transverse colon and distal rectum    :  Cass Banegas MD    Assistant(s): Endoscopy RN-1: Astrid Gaspar RN; Eliel Jones RN; Teresa LOMBARDI    Sedation: MAC    Complications: None    Implants: None    Procedure Details:  Prior to the procedure, a history and physical were performed. The patients medications, allergies and sensitivities were reviewed and all questions were answered. After informed consent was obtained for the procedure, with all risks and benefits of procedure explained. The patient was taken to the endoscopy suite and placed in the left lateral decubitus position. Patient identification and proposed procedure were verified prior to the procedure by the nurse and I. After sequential anesthesia administered by anesthesiologist, a digital rectal exam was performed and was normal.  The Olympus video colonoscope was introduced through the anus and advanced to cecum, which was identified by the ileocecal valve and appendiceal orifice. The quality of preparation was good. The colonoscope was slowly withdrawn and the mucosa examined for any abnormalities. Cecal withdrawal time was greater than 6 minutes. The patient tolerated the procedure well. There were no complications. Findings/Interventions:   Polyps - #1, 5 mm in size, located in the transverse colon, removed by cold biopsy and sent for pathology, - #2, 5 mm in size, located in the transverse colon, removed by cold biopsy, - #3, 5 mm in size, located in the rectum, removed by snare cautery and retrieved for pathology, - #4, 3 mm in size, located in the rectum, cautery destroyed.     EBL: none    Recommendations: -Repeat colonoscopy in 5 years. NO aspirin for 5 days.      Discharge Disposition:  Tasha Rasheed MD  10/7/2020  10:53 AM

## 2020-10-07 NOTE — ANESTHESIA PREPROCEDURE EVALUATION
Relevant Problems   No relevant active problems       Anesthetic History     PONV          Review of Systems / Medical History  Patient summary reviewed and pertinent labs reviewed    Pulmonary                   Neuro/Psych   Within defined limits           Cardiovascular    Hypertension: well controlled                Comments: Fibromyalgia   GI/Hepatic/Renal  Within defined limits              Endo/Other    Diabetes: well controlled, type 2  Hypothyroidism  Obesity     Other Findings              Physical Exam    Airway  Mallampati: II  TM Distance: 4 - 6 cm  Neck ROM: normal range of motion   Mouth opening: Normal     Cardiovascular    Rhythm: regular  Rate: normal         Dental  No notable dental hx       Pulmonary                Comments: Non labored Abdominal  GI exam deferred       Other Findings            Anesthetic Plan    ASA: 3  Anesthesia type: MAC            Anesthetic plan and risks discussed with: Patient

## 2020-10-09 LAB
LEFT GSV AT KNEE DIAM: 0.18 CM
LEFT GSV BK PROX DIAM: 0.17 CM
LEFT GSV JUNC DIAM: 0.69 CM
LEFT GSV THIGH PROX DIAM: 0.35 CM
LEFT SSV PROX DIAM: 0.23 CM

## 2020-10-16 ENCOUNTER — TELEPHONE (OUTPATIENT)
Dept: SURGERY | Age: 65
End: 2020-10-16

## 2020-10-16 NOTE — TELEPHONE ENCOUNTER
----- Message from Jennifer Dong MD sent at 10/12/2020  9:23 AM EDT -----  Benign polyp(s). Repeat colonoscopy in 5 year(s) as planned. Notified patient of pathology results. Tickler placed in recall for 5 years. Patient understands. Supportive social network of family or friends/Identifies reasons for living/Has future plans

## 2020-10-28 ENCOUNTER — APPOINTMENT (OUTPATIENT)
Dept: GENERAL RADIOLOGY | Age: 65
End: 2020-10-28
Attending: EMERGENCY MEDICINE
Payer: MEDICARE

## 2020-10-28 ENCOUNTER — APPOINTMENT (OUTPATIENT)
Dept: CT IMAGING | Age: 65
End: 2020-10-28
Attending: EMERGENCY MEDICINE
Payer: MEDICARE

## 2020-10-28 ENCOUNTER — HOSPITAL ENCOUNTER (EMERGENCY)
Age: 65
Discharge: HOME | End: 2020-10-28
Attending: EMERGENCY MEDICINE | Admitting: EMERGENCY MEDICINE
Payer: MEDICARE

## 2020-10-28 VITALS
DIASTOLIC BLOOD PRESSURE: 78 MMHG | TEMPERATURE: 97 F | OXYGEN SATURATION: 100 % | BODY MASS INDEX: 31.02 KG/M2 | WEIGHT: 158 LBS | HEART RATE: 69 BPM | SYSTOLIC BLOOD PRESSURE: 154 MMHG | HEIGHT: 60 IN | RESPIRATION RATE: 17 BRPM

## 2020-10-28 DIAGNOSIS — S20.211A CONTUSION OF RIB ON RIGHT SIDE, INITIAL ENCOUNTER: Primary | ICD-10-CM

## 2020-10-28 DIAGNOSIS — M79.601 PAIN OF RIGHT UPPER EXTREMITY: ICD-10-CM
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
## 2020-10-28 PROCEDURE — 71250 CT THORAX DX C-: CPT

## 2020-10-28 PROCEDURE — 73080 X-RAY EXAM OF ELBOW: CPT

## 2020-10-28 PROCEDURE — 99282 EMERGENCY DEPT VISIT SF MDM: CPT

## 2020-10-28 PROCEDURE — 73060 X-RAY EXAM OF HUMERUS: CPT

## 2020-10-28 NOTE — DISCHARGE INSTRUCTIONS
Patient Education             Patient Education        Bruised Rib: Care Instructions  Overview     You can get a bruised rib if you fall or get hit, such as in an accident or while playing sports. The medical term for a bruise is \"contusion. \" Small blood vessels get torn and leak blood under the skin. Most people think of a bruise as a black-and-blue area. But bones and muscles can also get bruised. An injury may damage the rib but not cause a bruise that you can see. Sometimes it can be hard to tell if a rib is bruised or broken. The symptoms may be the same. And a broken bone can't always be seen on an X-ray. But the treatment for a bruised rib is often the same as treatment for a broken one. An injury to the ribs can cause pain. The pain may be worse when you breathe deeply, cough, or sneeze. In most cases, a bruised rib will heal on its own. You can take pain medicine while the rib mends. Pain relief allows you to take deep breaths. Follow-up care is a key part of your treatment and safety. Be sure to make and go to all appointments, and call your doctor if you are having problems. It's also a good idea to know your test results and keep a list of the medicines you take. How can you care for yourself at home? · Rest and protect the injured or sore area. Stop, change, or take a break from any activity that causes pain. · Put ice or a cold pack on the area for 10 to 20 minutes at a time. Put a thin cloth between the ice and your skin. · After 2 or 3 days, if your swelling is gone, put a heating pad set on low or a warm cloth on your chest. Some doctors suggest that you go back and forth between hot and cold. Put a thin cloth between the heating pad and your skin. · Ask your doctor if you can take an over-the-counter pain medicine, such as acetaminophen (Tylenol), ibuprofen (Advil, Motrin), or naproxen (Aleve). Be safe with medicines. Read and follow all instructions on the label.   · As your pain gets better, slowly return to your normal activities. Be patient. Rib bruises can take weeks or months to heal. If the pain gets worse, it may be a sign that you need to rest a while longer. When should you call for help? Call 911 anytime you think you may need emergency care. For example, call if:    · You have severe trouble breathing. Call your doctor now or seek immediate medical care if:    · You have trouble breathing.     · You have a fever.     · You have a new or worse cough.     · You have new or worse pain. Watch closely for changes in your health, and be sure to contact your doctor if:    · You do not get better as expected. Where can you learn more? Go to http://www.gray.com/  Enter R125 in the search box to learn more about \"Bruised Rib: Care Instructions. \"  Current as of: June 26, 2019               Content Version: 12.6  © 1812-6999 [employer]. Care instructions adapted under license by [employer] (which disclaims liability or warranty for this information). If you have questions about a medical condition or this instruction, always ask your healthcare professional. [place] [address] any warranty or liability for your use of this information.

## 2020-10-28 NOTE — ED PROVIDER NOTES
EMERGENCY DEPARTMENT HISTORY AND PHYSICAL EXAM    12:26 PM      Date: 10/28/2020  Patient Name: Ana Rosa Maloney    History of Presenting Illness     Chief Complaint   Patient presents with   [facility] Fall    Arm Injury    Rib Pain         History Provided By: Patient    Chief Complaint: right chest wall pain, R arm pain  Duration:  Days  Timing:  Acute  Location: R chest, R elbow  Quality: Tightness  Severity: Moderate  Modifying Factors: worse with movement, deep breath  Associated Symptoms: denies any other associated signs or symptoms      Additional History (Context): Ana Rosa Maloney is a 72 y.o. female with diabetes and hypertension who presents with right-sided rib pain. Patient states 2 days ago she had a mechanical fall and tripped. She landed on her right knee right chest and did strike her head. No LOC. Reports she has been still having right-sided chest wall pain worse with deep breaths and movements. Also occasionally gets right elbow pain radiating up to the shoulder worse with any movement. Alleviates with not moving. Denies any shortness of breath. Is not on anticoagulation. No other complaints. Social: Denies drugs tobacco or EtOH    PCP: Javier Allen MD    Current Outpatient Medications   Medication Sig Dispense Refill    metFORMIN (GLUCOPHAGE) 500 mg tablet Take  by mouth two (2) times daily (with meals).  nitroglycerin (NITROSTAT) 0.4 mg SL tablet 1 Tab by SubLINGual route every five (5) minutes as needed for Chest Pain. Up to 3 doses. 25 Tab 3    LEVOTHYROXINE SODIUM (LEVOTHYROXINE PO) Take 100 mcg by mouth daily.  LISINOPRIL PO Take 10 mg by mouth daily.          Past History     Past Medical History:  Past Medical History:   Diagnosis Date    Diabetes ([address]     Fibromyalgia     HTN (hypertension)     Hypothyroid     Nausea & vomiting     Obesity     Varicose veins of both lower extremities        Past Surgical History:  Past Surgical History:   Procedure Laterality Date    COLONOSCOPY N/A 10/7/2020    COLONOSCOPY, biopsy, polypectomies performed by Alondra Lebron MD at [facility] ENDOSCOPY    HX CHOLECYSTECTOMY      HX COLONOSCOPY      age 48    HX GYN      colposcopy and portion cervix removed    HX HEENT      tonsillectomy    HX HERNIA REPAIR      abdominal       Family History:  History reviewed. No pertinent family history. Social History:  Social History     Tobacco Use    Smoking status: Never Smoker    Smokeless tobacco: Never Used   Substance Use Topics    Alcohol use: No     Frequency: Never    Drug use: No       Allergies: Allergies   Allergen Reactions    Compazine [Prochlorperazine Edisylate] Angioedema    Aspirin Hives         Review of Systems       Review of Systems   Constitutional: Negative for fever. Respiratory: Positive for shortness of breath. Cardiovascular: Positive for chest pain. Musculoskeletal: Positive for arthralgias. Neurological: Negative for numbness. All other systems reviewed and are negative. Physical Exam     Visit Vitals  BP (!) 154/78 (BP 1 Location: Right arm, BP Patient Position: At rest)   Pulse 69   Temp 97 °F (36.1 °C)   Resp 17   Ht 5' (1.524 m)   Wt 71.7 kg (158 lb)   SpO2 100%   BMI 30.86 kg/m²         Physical Exam  Constitutional:       Appearance: She is well-developed. HENT:      Head: Normocephalic and atraumatic. Neck:      Musculoskeletal: Neck supple. Vascular: No JVD. Cardiovascular:      Rate and Rhythm: Normal rate and regular rhythm. Pulmonary:      Effort: Pulmonary effort is normal. No respiratory distress. Breath sounds: Normal breath sounds. Chest:      Chest wall: Tenderness present. Comments: No crepitus  Abdominal:      General: There is no distension. Palpations: Abdomen is soft. Tenderness: There is no abdominal tenderness. There is no guarding or rebound.    Musculoskeletal:      Comments: Right elbow: Mild joint tenderness, is able to fully flex and extend, mild humeral tenderness, no shoulder joint tenderness or step-off, full range of motion of the shoulder. Gross sensation intact. Radial pulse 2+. Skin:     General: Skin is warm and dry. Findings: No erythema. Neurological:      Mental Status: She is alert and oriented to person, place, and time. Psychiatric:         Judgment: Judgment normal.           Diagnostic Study Results     Labs -  No results found for this or any previous visit (from the past 12 hour(s)). Radiologic Studies -   XR ELBOW RT MIN 3 V   Final Result   IMPRESSION:      No acute fracture or malalignment involving the right elbow. XR HUMERUS RT   Final Result   IMPRESSION:      Evidence of acute fracture or malalignment involving the right humerus. CT CHEST WO CONT   Final Result   IMPRESSION:      No acute intrathoracic abnormality. Moderate hiatal hernia. Hepatic steatosis. Medical Decision Making   I am the first provider for this patient. I reviewed the vital signs, available nursing notes, past medical history, past surgical history, family history and social history. Vital Signs-Reviewed the patient's vital signs. Pulse Oximetry Analysis -  100 on room air (Interpretation)nl       Records Reviewed: Nursing Notes (Time of Review: 12:26 PM)    ED Course: Progress Notes, Reevaluation, and Consults:      Provider Notes (Medical Decision Making): Six 11year-old female presenting after a fall. Complaining of right-sided chest wall pain will plan for CT to better evaluate for rib fractures possible small pneumothorax. Unlikely hemothorax. Did strike her head but no LOC and\" nonfocal neuro exam, no indication for head imaging. Will x-ray right elbow and humerus to evaluate for fracture. 1:42 PM  Discussed results with patient. Imaging unremarkable. Patient stable for discharge home. Discussed suspected course of injury. Diagnosis     Clinical Impression:   1.  Contusion of rib on right side, initial encounter    2. Pain of right upper extremity    3. Fall, initial encounter        Disposition: discharged    Follow-up Information     Follow up With Specialties Details Why Contact Info    Rolo Bernard MD Family Medicine Schedule an appointment as soon as possible for a visit in 1 week As needed [address] [address]  [phone]      [facility] EMERGENCY DEPT Emergency Medicine  As needed, If symptoms worsen [address]  [phone]           Patient's Medications   Start Taking    No medications on file   Continue Taking    LEVOTHYROXINE SODIUM (LEVOTHYROXINE PO)    Take 100 mcg by mouth daily. LISINOPRIL PO    Take 10 mg by mouth daily. METFORMIN (GLUCOPHAGE) 500 MG TABLET    Take  by mouth two (2) times daily (with meals). NITROGLYCERIN (NITROSTAT) 0.4 MG SL TABLET    1 Tab by SubLINGual route every five (5) minutes as needed for Chest Pain. Up to 3 doses.    These Medications have changed    No medications on file   Stop Taking    No medications on file     _______________________________

## 2020-10-28 NOTE — ED TRIAGE NOTES
Monday while walking fell and landed on right ribs and right arm. Hurts to take deep breath. Hurts to move arm.

## 2020-11-04 ENCOUNTER — OFFICE VISIT (OUTPATIENT)
Dept: VASCULAR SURGERY | Age: 65
End: 2020-11-04
Payer: MEDICARE

## 2020-11-04 VITALS
SYSTOLIC BLOOD PRESSURE: 122 MMHG | HEART RATE: 107 BPM | RESPIRATION RATE: 20 BRPM | DIASTOLIC BLOOD PRESSURE: 90 MMHG | OXYGEN SATURATION: 97 %

## 2020-11-04 DIAGNOSIS — I83.813 VARICOSE VEINS OF BOTH LOWER EXTREMITIES WITH PAIN: Primary | ICD-10-CM

## 2020-11-04 PROCEDURE — G9899 SCRN MAM PERF RSLTS DOC: HCPCS | Performed by: SURGERY

## 2020-11-04 PROCEDURE — 3288F FALL RISK ASSESSMENT DOCD: CPT | Performed by: SURGERY

## 2020-11-04 PROCEDURE — G8427 DOCREV CUR MEDS BY ELIG CLIN: HCPCS | Performed by: SURGERY

## 2020-11-04 PROCEDURE — 99212 OFFICE O/P EST SF 10 MIN: CPT | Performed by: SURGERY

## 2020-11-04 PROCEDURE — G8417 CALC BMI ABV UP PARAM F/U: HCPCS | Performed by: SURGERY

## 2020-11-04 PROCEDURE — 1090F PRES/ABSN URINE INCON ASSESS: CPT | Performed by: SURGERY

## 2020-11-04 PROCEDURE — 3017F COLORECTAL CA SCREEN DOC REV: CPT | Performed by: SURGERY

## 2020-11-04 PROCEDURE — G8400 PT W/DXA NO RESULTS DOC: HCPCS | Performed by: SURGERY

## 2020-11-04 PROCEDURE — G8510 SCR DEP NEG, NO PLAN REQD: HCPCS | Performed by: SURGERY

## 2020-11-04 PROCEDURE — 1100F PTFALLS ASSESS-DOCD GE2>/YR: CPT | Performed by: SURGERY

## 2020-11-04 PROCEDURE — G8536 NO DOC ELDER MAL SCRN: HCPCS | Performed by: SURGERY

## 2020-11-04 NOTE — PROGRESS NOTES
1. Have you been to the ER, urgent care clinic since your last visit? Hospitalized since your last visit? Yes Reason for visit: [address] [facility] emergency dept in 10/20 for a fall    2. Have you seen or consulted any other health care providers outside of the [address] [place] [address] since your last visit? Include any pap smears or colon screening.  No         3 most recent PHQ Screens 11/4/2020   Little interest or pleasure in doing things Not at all   Feeling down, depressed, irritable, or hopeless Not at all   Total Score PHQ 2 0

## 2020-11-07 NOTE — PROGRESS NOTES
Ana Rosa Maloney    Chief Complaint   Patient presents with    Varicose Veins       History and Physical    Ms. Talha Kruger returns to our office for continued follow-up and management of her bilateral lower extremity symptomatic varicose veins left worse than right. She states that her legs have felt better with compression stocking usage. She states that she did not wear them as regular as she can but has noticed improvement when she wears her compression. Denies any ulcerations or claudication symptoms. She denies any chest pain or shortness of breath. Past Medical History:   Diagnosis Date    Diabetes ([address]     Fibromyalgia     HTN (hypertension)     Hypothyroid     Nausea & vomiting     Obesity     Varicose veins of both lower extremities      There is no problem list on file for this patient. Past Surgical History:   Procedure Laterality Date    COLONOSCOPY N/A 10/7/2020    COLONOSCOPY, biopsy, polypectomies performed by Clau Yanes MD at [facility] ENDOSCOPY    HX CHOLECYSTECTOMY      HX COLONOSCOPY      age 48   [facility] HX GYN      colposcopy and portion cervix removed    HX HEENT      tonsillectomy    HX HERNIA REPAIR      abdominal     Current Outpatient Medications   Medication Sig Dispense Refill    metFORMIN (GLUCOPHAGE) 500 mg tablet Take  by mouth two (2) times daily (with meals).  nitroglycerin (NITROSTAT) 0.4 mg SL tablet 1 Tab by SubLINGual route every five (5) minutes as needed for Chest Pain. Up to 3 doses. 25 Tab 3    LEVOTHYROXINE SODIUM (LEVOTHYROXINE PO) Take 100 mcg by mouth daily.  LISINOPRIL PO Take 10 mg by mouth daily.        Allergies   Allergen Reactions    Compazine [Prochlorperazine Edisylate] Angioedema    Aspirin Hives     Social History     Socioeconomic History    Marital status:      Spouse name: Not on file    Number of children: Not on file    Years of education: Not on file    Highest education level: Not on file   Occupational History    Not on file   Social Needs    Financial resource strain: Not on file    Food insecurity     Worry: Not on file     Inability: Not on file    Transportation needs     Medical: Not on file     Non-medical: Not on file   Tobacco Use    Smoking status: Never Smoker    Smokeless tobacco: Never Used   Substance and Sexual Activity    Alcohol use: No     Frequency: Never    Drug use: No    Sexual activity: Not on file   Lifestyle    Physical activity     Days per week: Not on file     Minutes per session: Not on file    Stress: Not on file   Relationships    Social connections     Talks on phone: Not on file     Gets together: Not on file     Attends Latter day service: Not on file     Active member of club or organization: Not on file     Attends meetings of clubs or organizations: Not on file     Relationship status: Not on file    Intimate partner violence     Fear of current or ex partner: Not on file     Emotionally abused: Not on file     Physically abused: Not on file     Forced sexual activity: Not on file   Other Topics Concern    Not on file   Social History Narrative    Not on file      No family history on file. Review of Systems    Review of Systems   Constitutional: Negative for chills, diaphoresis, fever, malaise/fatigue and weight loss. HENT: Negative for hearing loss and sore throat. Eyes: Negative for blurred vision, photophobia and redness. Respiratory: Negative for cough, hemoptysis, shortness of breath and wheezing. Cardiovascular: Positive for leg swelling. Negative for chest pain, palpitations and orthopnea. Gastrointestinal: Negative for abdominal pain, blood in stool, constipation, diarrhea, heartburn, nausea and vomiting. Genitourinary: Negative for dysuria, frequency, hematuria and urgency. Musculoskeletal: Negative for back pain and myalgias. Skin: Negative for itching and rash.    Neurological: Negative for dizziness, speech change, focal weakness, weakness and headaches. Endo/Heme/Allergies: Does not bruise/bleed easily. Psychiatric/Behavioral: Negative for depression and suicidal ideas. Physical Exam:    Visit Vitals  BP (!) 122/90 (BP 1 Location: Left arm, BP Patient Position: Sitting)   Pulse (!) 107   Resp 20   SpO2 97%      Physical Examination: General appearance - alert, well appearing, and in no distress  Mental status - alert, oriented to person, place, and time  Eyes - sclera anicteric, left eye normal, right eye normal  Ears - right ear normal, left ear normal  Nose - normal and patent, no erythema, discharge or polyps  Mouth - mucous membranes moist, pharynx normal without lesions  Extremities -bilateral lower extremity with visible varicosities left worse than right. No significant edema. Impression and Plan:    ICD-10-CM ICD-9-CM    1. Varicose veins of both lower extremities with pain  I83.813 454.8      I reviewed Ms. El's venous reflux results with her. This shows no evidence of any significant reflux in her left lower extremity. Explained to Ms. Tenzin Bustamante that at this time I would recommend continue compression stocking usage more regularly as well as a possible phlebectomy of the varicosities if they continue to bother her. At this time Ms. Tenzin Bustamante states that she is gotten some relief from her compression would like to continue to wear these for this time. We discussed revisiting this issue in early spring. Ms. Tenzin Bustamante agrees with plan and will see us in follow-up will call us if she decides to proceed with phlebectomy sooner than that. The treatment plan was reviewed with the patient in detail. The patient voiced understanding of this plan and all questions and concerns were addressed. The patient agrees with this plan. We discussed the signs and symptoms that would require earlier attention or intervention.      The patient was given educational material related to his/her visit and the patient has voiced understanding of the material.     I appreciate the opportunity to participate in the care of your patient. I will be sure to keep you informed of any subsequent changes in the treatment plan. If you have any questions or concerns, please feel free to contact me. Ky Kaur MD    PLEASE NOTE:  This document has been produced using voice recognition software. Unrecognized errors in transcription may be present.

## 2021-03-17 ENCOUNTER — TRANSCRIBE ORDER (OUTPATIENT)
Dept: REGISTRATION | Age: 66
End: 2021-03-17

## 2021-03-17 ENCOUNTER — HOSPITAL ENCOUNTER (OUTPATIENT)
Dept: GENERAL RADIOLOGY | Age: 66
Discharge: HOME | End: 2021-03-17
Payer: MEDICARE

## 2021-03-17 DIAGNOSIS — M25.561 RIGHT KNEE PAIN: ICD-10-CM

## 2021-03-17 DIAGNOSIS — M25.551 RIGHT HIP PAIN: ICD-10-CM

## 2021-03-17 DIAGNOSIS — M19.90 OSTEOARTHRITIS: Primary | ICD-10-CM

## 2021-03-17 DIAGNOSIS — M19.90 OSTEOARTHRITIS: ICD-10-CM

## 2021-03-17 PROCEDURE — 73564 X-RAY EXAM KNEE 4 OR MORE: CPT

## 2021-03-17 PROCEDURE — 73552 X-RAY EXAM OF FEMUR 2/>: CPT

## 2021-03-17 PROCEDURE — 73502 X-RAY EXAM HIP UNI 2-3 VIEWS: CPT

## 2023-01-13 ENCOUNTER — OFFICE VISIT (OUTPATIENT)
Dept: CARDIOLOGY CLINIC | Age: 68
End: 2023-01-13
Payer: MEDICARE

## 2023-01-13 VITALS
SYSTOLIC BLOOD PRESSURE: 186 MMHG | TEMPERATURE: 98.8 F | HEART RATE: 82 BPM | WEIGHT: 166 LBS | DIASTOLIC BLOOD PRESSURE: 93 MMHG | OXYGEN SATURATION: 98 % | BODY MASS INDEX: 32.42 KG/M2

## 2023-01-13 DIAGNOSIS — I10 HYPERTENSION, UNSPECIFIED TYPE: Primary | ICD-10-CM

## 2023-01-13 DIAGNOSIS — R07.9 CHEST PAIN, UNSPECIFIED TYPE: ICD-10-CM

## 2023-01-13 RX ORDER — GUAIFENESIN 100 MG/5ML
LIQUID (ML) ORAL
COMMUNITY

## 2023-01-13 RX ORDER — SODIUM CHLORIDE 0.9 % (FLUSH) 0.9 %
5-40 SYRINGE (ML) INJECTION AS NEEDED
OUTPATIENT
Start: 2023-01-13

## 2023-01-13 RX ORDER — SODIUM CHLORIDE 0.9 % (FLUSH) 0.9 %
5-40 SYRINGE (ML) INJECTION EVERY 8 HOURS
OUTPATIENT
Start: 2023-01-13

## 2023-01-13 RX ORDER — METOPROLOL SUCCINATE 25 MG/1
25 TABLET, EXTENDED RELEASE ORAL DAILY
Qty: 90 TABLET | Refills: 1 | Status: SHIPPED | OUTPATIENT
Start: 2023-01-13

## 2023-01-13 NOTE — PROGRESS NOTES
[facility]    Ms. Ortiz Raza is a 79 y.o. female with a history of diabetes, hypertension, hypothyroidism and fibromyalgia. Ms. Ortiz Raza is here today for follow-up appointment  I have not seen her for almost 2 years  Patient decided to make this appointment as over the last couple weeks she has been experiencing some chest discomfort. It started without any warning. She described this as chest pain chest pressure sensation with left arm radiation. Patient did have nitroglycerin so she decided to take nitroglycerin which partially resolved pain after 15 minutes. Patient had another episode of chest pressure heavy sensation with some nausea for which she took 2 nitroglycerin with relief of discomfort. Patient got concerned so she decided to make this appointment. She denies any palpitation, presyncope or syncope. She has mild dyspnea with moderate exertion which has remained stable over the years according to patient  Denies any nausea, vomiting, abdominal pain, fever, chills, sputum production. No hematuria or other bleeding complaints    Past Medical History:   Diagnosis Date    Diabetes ([facility] [address]     Fibromyalgia     HTN (hypertension)     Hypothyroid     Nausea & vomiting     Obesity     Varicose veins of both lower extremities          Past Surgical History:   Procedure Laterality Date    COLONOSCOPY N/A 10/7/2020    COLONOSCOPY, biopsy, polypectomies performed by Juan Mccoy MD at [facility] ENDOSCOPY    HX CHOLECYSTECTOMY      HX COLONOSCOPY      age 48    HX GYN      colposcopy and portion cervix removed    HX HEENT      tonsillectomy    HX HERNIA REPAIR      abdominal       Current Outpatient Medications   Medication Sig    aspirin 81 mg chewable tablet 1 tablet    nitroglycerin (NITROSTAT) 0.4 mg SL tablet 1 Tab by SubLINGual route every five (5) minutes as needed for Chest Pain. Up to 3 doses.     LISINOPRIL PO Take 10 mg by mouth daily.    metFORMIN (GLUCOPHAGE) 500 mg tablet Take  by mouth two (2) times daily (with meals). LEVOTHYROXINE SODIUM (LEVOTHYROXINE PO) Take 100 mcg by mouth daily. No current facility-administered medications for this visit. Allergies and Sensitivities:  Allergies   Allergen Reactions    Compazine [Prochlorperazine Edisylate] Angioedema    Aspirin Hives       Family History:  No family history on file. Social History:  Social History     Tobacco Use    Smoking status: Never    Smokeless tobacco: Never   Substance Use Topics    Alcohol use: No    Drug use: No     She  reports that she has never smoked. She has never used smokeless tobacco.  She  reports no history of alcohol use. Review of Systems:  Cardiac symptoms as noted above in HPI. All others negative. Physical Exam:  BP Readings from Last 3 Encounters:   01/13/23 (!) 186/93   11/04/20 (!) 122/90   10/28/20 (!) 154/78         Pulse Readings from Last 3 Encounters:   01/13/23 82   11/04/20 (!) 107   10/28/20 69          Wt Readings from Last 3 Encounters:   01/13/23 75.3 kg (166 lb)   10/28/20 71.7 kg (158 lb)   10/01/20 72.6 kg (160 lb)       Constitutional: Oriented to person, place, and time. HENT: Head: Normocephalic and atraumatic  Neck: No JVD present. Cardiovascular: Regular rhythm. No murmur, gallop or rubs appreciated  Lung: Breath sounds normal. No respiratory distress. No ronchi or rales appreciated  Abdominal: No tenderness. No rebound and no guarding. Musculoskeletal: There is no lower extremity edema. No cynosis      Review of Data  LABS:   Lab Results   Component Value Date/Time    Sodium 140 11/06/2018 09:30 PM    Potassium 3.6 11/06/2018 09:30 PM    Chloride 103 11/06/2018 09:30 PM    CO2 28 11/06/2018 09:30 PM    Glucose 213 (H) 11/06/2018 09:30 PM    BUN 14 11/06/2018 09:30 PM    Creatinine 0.72 11/06/2018 09:30 PM     No flowsheet data found.   Lab Results   Component Value Date/Time    ALT (SGPT) 46 11/06/2018 09:30 PM     Lab Results   Component Value Date/Time    Hemoglobin A1c 7.8 (H) 10/01/2013 02:01 PM       EKG (11/18) Sinus rhythm at 91 bpm. Normal EKG. ECHO (11/18)  Left Ventricle Normal cavity size and wall thickness. There is low normal systolic function. The estimated ejection fraction is 51 - 55%. Visually measured ejection fraction. No regional wall motion abnormality noted. Slight septal bounce noted. There is age-appropriate left ventricular diastolic function. Left Atrium Normal cavity size. Right Ventricle Normal cavity size and global systolic function. Right Atrium Normal size. Aortic Valve No stenosis. Mild aortic valve sclerosis with no significant stenosis. Trace aortic valve regurgitation. Mitral Valve Normal valve structure and no stenosis. Trace regurgitation. Tricuspid Valve Normal valve structure and no stenosis. Mild tricuspid valve regurgitation. Pulmonary arterial systolic pressure is 28 mmHg. There is no evidence of pulmonary hypertension. Pulmonic Valve The pulmonic valve was not well visualized. No stenosis. Trace regurgitation. Aorta Normal aortic root, ascending aortic, and aortic arch. HOLTER (11/18)  1. The rhythm was sinus. 2. MS and QRS are within normal limits. 3. (2) Single VEs. 4. Patient diary was submitted symptom noted, no patient triggered events noted. Patient reported \"Chest pain, neck pain. .. \". Reported strips showed sinus rhythm with HR less than 105 bpm.     IMPRESSION & PLAN:  Ms. Magdalena Patel is a 61year old female with a history of diabetes, hypertension, obesity and other medical problems. Chest pain:    Patient had 3 or 4 episode of chest discomfort concerning for angina over the last 3 weeks responsive to nitroglycerin  Cardiac work up echo, stress, low risk in 11/18. Have asked patient to take nitroglycerin. Continue nitroglycerin as needed.   Use aspirin 81 mg daily  Because of symptoms concerning for angina, I recommend coronary evaluation. Discussed regarding management strategy which includes medical management vs. Ischemia evaluation ( non-invasive vs. Invasive). Risk, benefit and alternatives of each strategy discussed in detail. Risk, benefit, complication of LHC and possible PCI ( including but not limited to bleeding, vascular trauma requiring surgery,  infection, heart failure, stroke, MI, emergent bypass surgery, severe allergic reactions, kidney failure, dialysis and death ) were discussed with patient and willing to proceed with procedure. Will be using moderate sedation   By stating these are possible risks, this does not exclude the potential for additional risks not named here. Hypertension:  /93. Repeat blood pressure was better. Currently taking lisinopril. I have asked patient to start taking Toprol-XL 25 mg daily. Side effect discussed. She will keep checking blood pressure at home as well    Diabetes:  Goal Hgb A1c less than 7 is recommended from a cardiovascular standpoint. She is on Metformin. I am going to defer this management to PCP. Recently has been started on Trulicity    Obesity: Max weight 220 lbs. Now down to 166 lbs with diet and exercise. Continue same. This plan was discussed with patient who is in agreement. Thank you for allowing me to participate in patient care. Please feel free to call me if you have any question or concern. Jatinder Bauer MD  Please note: This document has been produced using voice recognition software. Unrecognized errors in transcription may be present.

## 2023-01-13 NOTE — PATIENT INSTRUCTIONS
Testing   Echo  **call office 3-5 days after testing is completed for results**     New Medication/Medication Changes  Start Toprol XL 25 mg one tab daily         **please allow 24-48 hrs for medication to be escribed to pharmacy** If you need any refills on medications please contact your pharmacy so that the request can be escribed to the provider for review seven to 10 days prior to being out of medication. Labs-COMPLETE 3-5 DAYS PRIOR TO HEART CATH   CBC  CMP  PT/INR        No appointment required for lab work  [address], [facility] [address]  Hours are Mon-Fri 7:00 am-3:30 pm  **closed from 12:30 pm-1pm daily**      New Location Address- projected for the month of February 2023    [facility] [address], [facility] [address]     Instructions    Pre procedure labs(CBC, CMP, PT/INR) to be completed within 3-5 days prior to procedure. Labs drawn in [address]. Their hours of operation are Monday through Friday 7am-3:30 pm.      Patients Name:  Haider Cabral are scheduled to have a HEART CATH  on 02/03/2023  at 9:15 AM  Please check in at 7:45 AM  .   **YOU WILL NEED SOMEONE TO DRIVE YOU HOME AFTER PROCEDURE. Please go to [facility] and park in the outpatient parking lot that is located around to the back of the hospital and enter through the [facility] building. Once you enter through the [facility] check in with the  there. The  will either give you directions or assist you in getting to the cath holding area. You are not to eat or drink anything after midnight the night before your procedure. Small sips of water to take your medications is ok. If you are diabetic, do not take your insulin/sugar pill the morning of the procedure.     MEDICATION INSTRUCTIONS:   Please take your morning medications with the following special instructions:    [x]          Please make sure to take your Blood pressure medication : LISINOPRIL. [x]          Stop your Glucophage (Metformin) 48 hrs prior to procedure and do not resume it until after you have the blood work done. We encourage families to wait in the waiting room on the first floor while the procedure is being done. The Doctor will come out and talk with you as soon as the procedure is over. There is the possibility that you may spend the night in the hospital, depending on the results of the procedure. This will be determined after the procedure is done. If angioplasty or stent is planned, you will stay at least one day. If you or your family have any questions, please call our office Monday -Friday, 9:00 a.m.-4:30 p.m.,  At [phone], and ask to speak to one of the nurses.

## 2023-01-13 NOTE — PROGRESS NOTES
Identified pt with two pt identifiers(name and ). Reviewed record in preparation for visit and have obtained necessary documentation. Ayanna Valentino presents today for   Chief Complaint   Patient presents with    New Patient     Last seen 2019       Pt c/o CHEST PAIN/ PRESSURE, FATIGUE/WEAKNESS. Ayanna Valentino preferred language for health care discussion is english/other. Personal Protective Equipment:   Personal Protective Equipment was used including: mask-surgical and hands-gloves. Patient was placed on no precaution(s). Patient was masked. Precautions:   Patient currently on None  Patient currently roomed with door closed. Is someone accompanying this pt? no    Is the patient using any DME equipment during [address]? no    Depression Screening:  3 most recent PHQ Screens 2023   Little interest or pleasure in doing things Not at all   Feeling down, depressed, irritable, or hopeless Not at all   Total Score PHQ 2 0       Learning Assessment:  Learning Assessment 2020   PRIMARY LEARNER Patient   BARRIERS PRIMARY LEARNER -   PRIMARY LANGUAGE ENGLISH   LEARNER PREFERENCE PRIMARY READING   ANSWERED BY patient   RELATIONSHIP SELF       Abuse Screening:  Abuse Screening Questionnaire 2020   Do you ever feel afraid of your partner? N   Are you in a relationship with someone who physically or mentally threatens you? N   Is it safe for you to go home? Y       Fall Risk  Fall Risk Assessment, last 12 mths 2023   Able to walk? Yes   Fall in past 12 months? -   Number of falls in past 12 months -   Fall with injury? -       Pt currently taking Anticoagulant therapy? no  Pt currently taking Antiplatelet therapy? yes    Coordination of Care:  1. Have you been to the ER, urgent care clinic since your last visit? Hospitalized since your last visit? no    2. Have you seen or consulted any other health care providers outside of the [address] [place] [address] since your last visit?  Include any pap smears or colon screening. no      Please see Red banners under Allergies and Med Rec to remove outside inquires. All correct information has been verified with patient and added to chart.      Medication's patient's would liked removed has been marked not taking to be removed per Verbal order and read back per Shauna Mckay MD

## 2023-01-16 ENCOUNTER — DOCUMENTATION ONLY (OUTPATIENT)
Dept: CARDIOLOGY CLINIC | Age: 68
End: 2023-01-16

## 2023-01-16 NOTE — PROGRESS NOTES
Patient is approved     Cert number: [id number]  Auth window: 02/03/2023 - 03/23/2023  CPT: 81230  DX: R07.9, R06.02

## 2023-02-01 ENCOUNTER — HOSPITAL ENCOUNTER (OUTPATIENT)
Dept: LAB | Age: 68
Discharge: HOME | DRG: 948 | End: 2023-02-01
Payer: MEDICARE

## 2023-02-01 DIAGNOSIS — I10 HYPERTENSION, UNSPECIFIED TYPE: ICD-10-CM

## 2023-02-01 DIAGNOSIS — R07.9 CHEST PAIN, UNSPECIFIED TYPE: ICD-10-CM

## 2023-02-01 LAB
ALBUMIN SERPL-MCNC: 3.9 G/DL (ref 3.4–5)
ALBUMIN/GLOB SERPL: 1.6 (ref 0.8–1.7)
ALP SERPL-CCNC: 101 U/L (ref 45–117)
ALT SERPL-CCNC: 42 U/L (ref 13–56)
ANION GAP SERPL CALC-SCNC: 4 MMOL/L (ref 3–18)
AST SERPL-CCNC: 37 U/L (ref 10–38)
BASOPHILS # BLD: 0 K/UL (ref 0–0.1)
BASOPHILS NFR BLD: 0 % (ref 0–2)
BILIRUB SERPL-MCNC: 0.5 MG/DL (ref 0.2–1)
BUN SERPL-MCNC: 13 MG/DL (ref 7–18)
BUN/CREAT SERPL: 19 (ref 12–20)
CALCIUM SERPL-MCNC: 8.7 MG/DL (ref 8.5–10.1)
CHLORIDE SERPL-SCNC: 107 MMOL/L (ref 100–111)
CO2 SERPL-SCNC: 28 MMOL/L (ref 21–32)
CREAT SERPL-MCNC: 0.67 MG/DL (ref 0.6–1.3)
DIFFERENTIAL METHOD BLD: ABNORMAL
EOSINOPHIL # BLD: 0.3 K/UL (ref 0–0.4)
EOSINOPHIL NFR BLD: 5 % (ref 0–5)
ERYTHROCYTE [DISTWIDTH] IN BLOOD BY AUTOMATED COUNT: 12.7 % (ref 11.6–14.5)
GLOBULIN SER CALC-MCNC: 2.5 G/DL (ref 2–4)
GLUCOSE SERPL-MCNC: 163 MG/DL (ref 74–99)
HCT VFR BLD AUTO: 38.3 % (ref 35–45)
HGB BLD-MCNC: 12.6 G/DL (ref 12–16)
IMM GRANULOCYTES # BLD AUTO: 0 K/UL (ref 0–0.04)
IMM GRANULOCYTES NFR BLD AUTO: 0 % (ref 0–0.5)
INR PPP: 1 (ref 0.8–1.2)
LYMPHOCYTES # BLD: 1.8 K/UL (ref 0.9–3.6)
LYMPHOCYTES NFR BLD: 32 % (ref 21–52)
MCH RBC QN AUTO: 30.6 PG (ref 24–34)
MCHC RBC AUTO-ENTMCNC: 32.9 G/DL (ref 31–37)
MCV RBC AUTO: 93 FL (ref 78–100)
MONOCYTES # BLD: 0.4 K/UL (ref 0.05–1.2)
MONOCYTES NFR BLD: 7 % (ref 3–10)
NEUTS SEG # BLD: 3.2 K/UL (ref 1.8–8)
NEUTS SEG NFR BLD: 55 % (ref 40–73)
NRBC # BLD: 0 K/UL (ref 0–0.01)
NRBC BLD-RTO: 0 PER 100 WBC
PLATELET # BLD AUTO: 234 K/UL (ref 135–420)
PMV BLD AUTO: 11 FL (ref 9.2–11.8)
POTASSIUM SERPL-SCNC: 4 MMOL/L (ref 3.5–5.5)
PROT SERPL-MCNC: 6.4 G/DL (ref 6.4–8.2)
PROTHROMBIN TIME: 13.5 SEC (ref 11.5–15.2)
RBC # BLD AUTO: 4.12 M/UL (ref 4.2–5.3)
SODIUM SERPL-SCNC: 139 MMOL/L (ref 136–145)
WBC # BLD AUTO: 5.7 K/UL (ref 4.6–13.2)

## 2023-02-01 PROCEDURE — 80053 COMPREHEN METABOLIC PANEL: CPT

## 2023-02-01 PROCEDURE — 85610 PROTHROMBIN TIME: CPT

## 2023-02-01 PROCEDURE — 85025 COMPLETE CBC W/AUTO DIFF WBC: CPT

## 2023-02-01 PROCEDURE — 9990 CHARGE CONVERSION

## 2023-02-01 PROCEDURE — 36415 COLL VENOUS BLD VENIPUNCTURE: CPT

## 2023-02-03 ENCOUNTER — APPOINTMENT (OUTPATIENT)
Dept: VASCULAR SURGERY | Age: 68
DRG: 948 | End: 2023-02-03
Attending: PHYSICIAN ASSISTANT
Payer: MEDICARE

## 2023-02-03 ENCOUNTER — APPOINTMENT (OUTPATIENT)
Dept: NON INVASIVE DIAGNOSTICS | Age: 68
DRG: 948 | End: 2023-02-03
Attending: PHYSICIAN ASSISTANT
Payer: MEDICARE

## 2023-02-03 ENCOUNTER — HOSPITAL ENCOUNTER (INPATIENT)
Facility: HOSPITAL | Age: 68
LOS: 10 days | Discharge: HOME HEALTH | DRG: 234 | End: 2023-02-13
Attending: INTERNAL MEDICINE | Admitting: THORACIC SURGERY (CARDIOTHORACIC VASCULAR SURGERY)
Payer: MEDICARE

## 2023-02-03 ENCOUNTER — ANESTHESIA EVENT (OUTPATIENT)
Dept: CARDIOTHORACIC SURGERY | Age: 68
DRG: 948 | End: 2023-02-03
Payer: MEDICARE

## 2023-02-03 ENCOUNTER — APPOINTMENT (OUTPATIENT)
Dept: CT IMAGING | Age: 68
DRG: 948 | End: 2023-02-03
Attending: PHYSICIAN ASSISTANT
Payer: MEDICARE

## 2023-02-03 ENCOUNTER — APPOINTMENT (OUTPATIENT)
Dept: GENERAL RADIOLOGY | Age: 68
DRG: 948 | End: 2023-02-03
Attending: PHYSICIAN ASSISTANT
Payer: MEDICARE

## 2023-02-03 ENCOUNTER — HOSPITAL ENCOUNTER (INPATIENT)
Age: 68
LOS: 8 days | Discharge: STILL ADMITTED OR ADMINISTRATIVE | DRG: 948 | End: 2023-02-11
Attending: INTERNAL MEDICINE | Admitting: THORACIC SURGERY (CARDIOTHORACIC VASCULAR SURGERY)
Payer: MEDICARE

## 2023-02-03 DIAGNOSIS — Z95.1 S/P CABG X 3: ICD-10-CM

## 2023-02-03 DIAGNOSIS — R07.9 CHEST PAIN, UNSPECIFIED TYPE: ICD-10-CM

## 2023-02-03 DIAGNOSIS — I25.10 CORONARY ARTERY DISEASE INVOLVING NATIVE CORONARY ARTERY OF NATIVE HEART, UNSPECIFIED WHETHER ANGINA PRESENT: Primary | ICD-10-CM

## 2023-02-03 PROBLEM — E66.9 OBESITY: Status: ACTIVE | Noted: 2023-02-03

## 2023-02-03 PROBLEM — E78.2 MIXED HYPERLIPIDEMIA: Status: ACTIVE | Noted: 2023-02-03

## 2023-02-03 PROBLEM — L03.115 CELLULITIS OF RIGHT FOOT DUE TO METHICILLIN-RESISTANT STAPHYLOCOCCUS AUREUS: Status: ACTIVE | Noted: 2023-02-03

## 2023-02-03 PROBLEM — M19.90 OSTEOARTHROSIS: Status: ACTIVE | Noted: 2023-02-03

## 2023-02-03 PROBLEM — E11.9 TYPE 2 DIABETES MELLITUS WITHOUT COMPLICATION (HCC): Status: ACTIVE | Noted: 2023-02-03

## 2023-02-03 PROBLEM — I83.92 VARICOSE VEINS OF LEFT LOWER EXTREMITY: Status: ACTIVE | Noted: 2023-02-03

## 2023-02-03 PROBLEM — I10 ESSENTIAL HYPERTENSION: Status: ACTIVE | Noted: 2023-02-03

## 2023-02-03 PROBLEM — B95.62 CELLULITIS OF RIGHT FOOT DUE TO METHICILLIN-RESISTANT STAPHYLOCOCCUS AUREUS: Status: ACTIVE | Noted: 2023-02-03

## 2023-02-03 LAB
BASOPHILS # BLD: 0 K/UL (ref 0–0.1)
BASOPHILS NFR BLD: 0 % (ref 0–2)
DIFFERENTIAL METHOD BLD: ABNORMAL
ECHO AO ARCH DIAM: 3.1 CM
ECHO AO ASC DIAM: 3.9 CM
ECHO AO ASCENDING AORTA INDEX: 2.14 CM/M2
ECHO AO DISTAL TRANSVERSE DIAM: 2.6 CM
ECHO AO ROOT DIAM: 2.9 CM
ECHO AO ROOT INDEX: 1.59 CM/M2
ECHO AV AREA PEAK VELOCITY: 1.8 CM2
ECHO AV AREA VTI: 1.9 CM2
ECHO AV AREA/BSA PEAK VELOCITY: 1 CM2/M2
ECHO AV AREA/BSA VTI: 1 CM2/M2
ECHO AV MEAN GRADIENT: 7 MMHG
ECHO AV MEAN VELOCITY: 1.2 M/S
ECHO AV PEAK GRADIENT: 12 MMHG
ECHO AV PEAK VELOCITY: 1.8 M/S
ECHO AV VELOCITY RATIO: 0.5
ECHO AV VTI: 37.8 CM
ECHO EST RA PRESSURE: 3 MMHG
ECHO LA VOL 2C: 32 ML (ref 22–52)
ECHO LA VOL 4C: 59 ML (ref 22–52)
ECHO LA VOLUME AREA LENGTH: 51 ML
ECHO LA VOLUME INDEX A2C: 18 ML/M2 (ref 16–34)
ECHO LA VOLUME INDEX A4C: 32 ML/M2 (ref 16–34)
ECHO LA VOLUME INDEX AREA LENGTH: 28 ML/M2 (ref 16–34)
ECHO LV E' LATERAL VELOCITY: 9 CM/S
ECHO LV E' SEPTAL VELOCITY: 6 CM/S
ECHO LV FRACTIONAL SHORTENING: 26 % (ref 28–44)
ECHO LV INTERNAL DIMENSION DIASTOLE INDEX: 2.31 CM/M2
ECHO LV INTERNAL DIMENSION DIASTOLIC: 4.2 CM (ref 3.9–5.3)
ECHO LV INTERNAL DIMENSION SYSTOLIC INDEX: 1.7 CM/M2
ECHO LV INTERNAL DIMENSION SYSTOLIC: 3.1 CM
ECHO LV IVSD: 1.1 CM (ref 0.6–0.9)
ECHO LV MASS 2D: 137.2 G (ref 67–162)
ECHO LV MASS INDEX 2D: 75.4 G/M2 (ref 43–95)
ECHO LV POSTERIOR WALL DIASTOLIC: 0.9 CM (ref 0.6–0.9)
ECHO LV RELATIVE WALL THICKNESS RATIO: 0.43
ECHO LVOT AREA: 3.1 CM2
ECHO LVOT AV VTI INDEX: 0.57
ECHO LVOT DIAM: 2 CM
ECHO LVOT MEAN GRADIENT: 2 MMHG
ECHO LVOT PEAK GRADIENT: 3 MMHG
ECHO LVOT PEAK VELOCITY: 0.9 M/S
ECHO LVOT STROKE VOLUME INDEX: 36.9 ML/M2
ECHO LVOT SV: 67.2 ML
ECHO LVOT VTI: 21.4 CM
ECHO MV A VELOCITY: 0.9 M/S
ECHO MV E DECELERATION TIME (DT): 181.2 MS
ECHO MV E VELOCITY: 0.71 M/S
ECHO MV E/A RATIO: 0.79
ECHO MV E/E' LATERAL: 7.89
ECHO MV E/E' RATIO (AVERAGED): 9.86
ECHO MV E/E' SEPTAL: 11.83
ECHO RIGHT VENTRICULAR SYSTOLIC PRESSURE (RVSP): 22 MMHG
ECHO RV BASAL DIMENSION: 4.4 CM
ECHO RV FREE WALL PEAK S': 10 CM/S
ECHO RV LONGITUDINAL DIMENSION: 6.2 CM
ECHO RV MID DIMENSION: 4.4 CM
ECHO RV TAPSE: 1.7 CM (ref 1.7–?)
ECHO TV REGURGITANT MAX VELOCITY: 2.18 M/S
ECHO TV REGURGITANT PEAK GRADIENT: 19 MMHG
EOSINOPHIL # BLD: 0.2 K/UL (ref 0–0.4)
EOSINOPHIL NFR BLD: 4 % (ref 0–5)
ERYTHROCYTE [DISTWIDTH] IN BLOOD BY AUTOMATED COUNT: 12.6 % (ref 11.6–14.5)
GLUCOSE BLD STRIP.AUTO-MCNC: 114 MG/DL (ref 70–110)
GLUCOSE BLD STRIP.AUTO-MCNC: 194 MG/DL (ref 70–110)
HCT VFR BLD AUTO: 37.7 % (ref 35–45)
HGB BLD-MCNC: 12.7 G/DL (ref 12–16)
IMM GRANULOCYTES # BLD AUTO: 0 K/UL (ref 0–0.04)
IMM GRANULOCYTES NFR BLD AUTO: 0 % (ref 0–0.5)
LYMPHOCYTES # BLD: 1.7 K/UL (ref 0.9–3.6)
LYMPHOCYTES NFR BLD: 31 % (ref 21–52)
MCH RBC QN AUTO: 30.7 PG (ref 24–34)
MCHC RBC AUTO-ENTMCNC: 33.7 G/DL (ref 31–37)
MCV RBC AUTO: 91.1 FL (ref 78–100)
MONOCYTES # BLD: 0.4 K/UL (ref 0.05–1.2)
MONOCYTES NFR BLD: 8 % (ref 3–10)
NEUTS SEG # BLD: 3.1 K/UL (ref 1.8–8)
NEUTS SEG NFR BLD: 56 % (ref 40–73)
NRBC # BLD: 0 K/UL (ref 0–0.01)
NRBC BLD-RTO: 0 PER 100 WBC
PLATELET # BLD AUTO: 217 K/UL (ref 135–420)
PMV BLD AUTO: 9.9 FL (ref 9.2–11.8)
RBC # BLD AUTO: 4.14 M/UL (ref 4.2–5.3)
WBC # BLD AUTO: 5.5 K/UL (ref 4.6–13.2)

## 2023-02-03 PROCEDURE — 74011636637 HC RX REV CODE- 636/637: Performed by: PHYSICIAN ASSISTANT

## 2023-02-03 PROCEDURE — 93970 EXTREMITY STUDY: CPT

## 2023-02-03 PROCEDURE — 77030013797 HC KT TRNSDUC PRSSR EDWD -A: Performed by: INTERNAL MEDICINE

## 2023-02-03 PROCEDURE — 74011250636 HC RX REV CODE- 250/636: Performed by: THORACIC SURGERY (CARDIOTHORACIC VASCULAR SURGERY)

## 2023-02-03 PROCEDURE — 71046 X-RAY EXAM CHEST 2 VIEWS: CPT

## 2023-02-03 PROCEDURE — APPNB180 APP NON BILLABLE TIME > 60 MINS: Performed by: PHYSICIAN ASSISTANT

## 2023-02-03 PROCEDURE — 99152 MOD SED SAME PHYS/QHP 5/>YRS: CPT | Performed by: INTERNAL MEDICINE

## 2023-02-03 PROCEDURE — C1769 GUIDE WIRE: HCPCS | Performed by: INTERNAL MEDICINE

## 2023-02-03 PROCEDURE — 74011250637 HC RX REV CODE- 250/637: Performed by: PHYSICIAN ASSISTANT

## 2023-02-03 PROCEDURE — 93880 EXTRACRANIAL BILAT STUDY: CPT

## 2023-02-03 PROCEDURE — 74011000636 HC RX REV CODE- 636: Performed by: INTERNAL MEDICINE

## 2023-02-03 PROCEDURE — 2709999900 HC NON-CHARGEABLE SUPPLY: Performed by: INTERNAL MEDICINE

## 2023-02-03 PROCEDURE — 77030004558 HC CATH ANGI DX SUPR TORQ CARD -A: Performed by: INTERNAL MEDICINE

## 2023-02-03 PROCEDURE — 74011250637 HC RX REV CODE- 250/637: Performed by: INTERNAL MEDICINE

## 2023-02-03 PROCEDURE — C1894 INTRO/SHEATH, NON-LASER: HCPCS | Performed by: INTERNAL MEDICINE

## 2023-02-03 PROCEDURE — 74011250636 HC RX REV CODE- 250/636: Performed by: INTERNAL MEDICINE

## 2023-02-03 PROCEDURE — 36415 COLL VENOUS BLD VENIPUNCTURE: CPT

## 2023-02-03 PROCEDURE — B2151ZZ FLUOROSCOPY OF LEFT HEART USING LOW OSMOLAR CONTRAST: ICD-10-PCS | Performed by: INTERNAL MEDICINE

## 2023-02-03 PROCEDURE — 93458 L HRT ARTERY/VENTRICLE ANGIO: CPT | Performed by: INTERNAL MEDICINE

## 2023-02-03 PROCEDURE — 77030013761 HC KT HRT LFT ANGI -B: Performed by: INTERNAL MEDICINE

## 2023-02-03 PROCEDURE — 93458 L HRT ARTERY/VENTRICLE ANGIO: CPT

## 2023-02-03 PROCEDURE — 77030029997 HC DEV COM RDL R BND TELE -B: Performed by: INTERNAL MEDICINE

## 2023-02-03 PROCEDURE — 85025 COMPLETE CBC W/AUTO DIFF WBC: CPT

## 2023-02-03 PROCEDURE — 65270000029 HC RM PRIVATE

## 2023-02-03 PROCEDURE — 77030012597: Performed by: INTERNAL MEDICINE

## 2023-02-03 PROCEDURE — 71250 CT THORAX DX C-: CPT

## 2023-02-03 PROCEDURE — 4A023N7 MEASUREMENT OF CARDIAC SAMPLING AND PRESSURE, LEFT HEART, PERCUTANEOUS APPROACH: ICD-10-PCS | Performed by: INTERNAL MEDICINE

## 2023-02-03 PROCEDURE — 82962 GLUCOSE BLOOD TEST: CPT

## 2023-02-03 PROCEDURE — 99153 MOD SED SAME PHYS/QHP EA: CPT

## 2023-02-03 PROCEDURE — 77030015766: Performed by: INTERNAL MEDICINE

## 2023-02-03 PROCEDURE — 74011000250 HC RX REV CODE- 250: Performed by: INTERNAL MEDICINE

## 2023-02-03 PROCEDURE — 9990 CHARGE CONVERSION

## 2023-02-03 PROCEDURE — 99153 MOD SED SAME PHYS/QHP EA: CPT | Performed by: INTERNAL MEDICINE

## 2023-02-03 PROCEDURE — 65660000004 HC RM CVT STEPDOWN

## 2023-02-03 PROCEDURE — 74011000250 HC RX REV CODE- 250: Performed by: PHYSICIAN ASSISTANT

## 2023-02-03 PROCEDURE — C8929 TTE W OR WO FOL WCON,DOPPLER: HCPCS

## 2023-02-03 PROCEDURE — B2111ZZ FLUOROSCOPY OF MULTIPLE CORONARY ARTERIES USING LOW OSMOLAR CONTRAST: ICD-10-PCS | Performed by: INTERNAL MEDICINE

## 2023-02-03 PROCEDURE — 99223 1ST HOSP IP/OBS HIGH 75: CPT | Performed by: PHYSICIAN ASSISTANT

## 2023-02-03 PROCEDURE — 99152 MOD SED SAME PHYS/QHP 5/>YRS: CPT

## 2023-02-03 PROCEDURE — G0378 HOSPITAL OBSERVATION PER HR: HCPCS

## 2023-02-03 RX ORDER — AMIODARONE HYDROCHLORIDE 200 MG/1
200 TABLET ORAL 2 TIMES DAILY WITH MEALS
Status: DISCONTINUED | OUTPATIENT
Start: 2023-02-09 | End: 2023-02-05

## 2023-02-03 RX ORDER — HEPARIN SODIUM 200 [USP'U]/100ML
INJECTION, SOLUTION INTRAVENOUS AS NEEDED
Status: DISCONTINUED | OUTPATIENT
Start: 2023-02-03 | End: 2023-02-03 | Stop reason: HOSPADM

## 2023-02-03 RX ORDER — GUAIFENESIN 100 MG/5ML
81 LIQUID (ML) ORAL
Status: COMPLETED | OUTPATIENT
Start: 2023-02-03 | End: 2023-02-03

## 2023-02-03 RX ORDER — SODIUM CHLORIDE 9 MG/ML
100 INJECTION, SOLUTION INTRAVENOUS CONTINUOUS
Status: DISCONTINUED | OUTPATIENT
Start: 2023-02-03 | End: 2023-02-05

## 2023-02-03 RX ORDER — MIDAZOLAM HYDROCHLORIDE 1 MG/ML
INJECTION, SOLUTION INTRAMUSCULAR; INTRAVENOUS AS NEEDED
Status: DISCONTINUED | OUTPATIENT
Start: 2023-02-03 | End: 2023-02-03 | Stop reason: HOSPADM

## 2023-02-03 RX ORDER — AMIODARONE HYDROCHLORIDE 200 MG/1
400 TABLET ORAL
Status: DISCONTINUED | OUTPATIENT
Start: 2023-02-03 | End: 2023-02-05

## 2023-02-03 RX ORDER — HEPARIN SODIUM 1000 [USP'U]/ML
INJECTION, SOLUTION INTRAVENOUS; SUBCUTANEOUS AS NEEDED
Status: DISCONTINUED | OUTPATIENT
Start: 2023-02-03 | End: 2023-02-03 | Stop reason: HOSPADM

## 2023-02-03 RX ORDER — AMIODARONE HYDROCHLORIDE 200 MG/1
400 TABLET ORAL 3 TIMES DAILY
Status: CANCELLED | OUTPATIENT
Start: 2023-02-03 | End: 2023-02-08

## 2023-02-03 RX ORDER — ATORVASTATIN CALCIUM 40 MG/1
80 TABLET, FILM COATED ORAL
Status: DISCONTINUED | OUTPATIENT
Start: 2023-02-03 | End: 2023-02-04

## 2023-02-03 RX ORDER — LIDOCAINE HYDROCHLORIDE 10 MG/ML
INJECTION, SOLUTION EPIDURAL; INFILTRATION; INTRACAUDAL; PERINEURAL AS NEEDED
Status: DISCONTINUED | OUTPATIENT
Start: 2023-02-03 | End: 2023-02-03 | Stop reason: HOSPADM

## 2023-02-03 RX ORDER — HYDRALAZINE HYDROCHLORIDE 20 MG/ML
10 INJECTION INTRAMUSCULAR; INTRAVENOUS
Status: DISCONTINUED | OUTPATIENT
Start: 2023-02-03 | End: 2023-02-06

## 2023-02-03 RX ORDER — SODIUM CHLORIDE 0.9 % (FLUSH) 0.9 %
5-40 SYRINGE (ML) INJECTION AS NEEDED
Status: DISCONTINUED | OUTPATIENT
Start: 2023-02-03 | End: 2023-02-06

## 2023-02-03 RX ORDER — MUPIROCIN 20 MG/G
OINTMENT TOPICAL 2 TIMES DAILY
Status: DISCONTINUED | OUTPATIENT
Start: 2023-02-03 | End: 2023-02-06 | Stop reason: SDUPTHER

## 2023-02-03 RX ORDER — ASPIRIN 81 MG/1
81 TABLET ORAL DAILY
Status: DISCONTINUED | OUTPATIENT
Start: 2023-02-04 | End: 2023-02-06

## 2023-02-03 RX ORDER — DEXTROSE MONOHYDRATE 100 MG/ML
0-250 INJECTION, SOLUTION INTRAVENOUS AS NEEDED
Status: DISCONTINUED | OUTPATIENT
Start: 2023-02-03 | End: 2023-02-06

## 2023-02-03 RX ORDER — SODIUM CHLORIDE 9 MG/ML
500 INJECTION, SOLUTION INTRAVENOUS CONTINUOUS
Status: CANCELLED | OUTPATIENT
Start: 2023-02-05

## 2023-02-03 RX ORDER — LEVOTHYROXINE SODIUM 100 UG/1
100 TABLET ORAL DAILY
Status: DISCONTINUED | OUTPATIENT
Start: 2023-02-04 | End: 2023-02-11 | Stop reason: HOSPADM

## 2023-02-03 RX ORDER — FENTANYL CITRATE 50 UG/ML
INJECTION, SOLUTION INTRAMUSCULAR; INTRAVENOUS AS NEEDED
Status: DISCONTINUED | OUTPATIENT
Start: 2023-02-03 | End: 2023-02-03 | Stop reason: HOSPADM

## 2023-02-03 RX ORDER — SODIUM CHLORIDE 0.9 % (FLUSH) 0.9 %
5-40 SYRINGE (ML) INJECTION EVERY 8 HOURS
Status: DISCONTINUED | OUTPATIENT
Start: 2023-02-03 | End: 2023-02-06

## 2023-02-03 RX ORDER — IBUPROFEN 200 MG
4 TABLET ORAL AS NEEDED
Status: DISCONTINUED | OUTPATIENT
Start: 2023-02-03 | End: 2023-02-06

## 2023-02-03 RX ORDER — INSULIN LISPRO 100 [IU]/ML
INJECTION, SOLUTION INTRAVENOUS; SUBCUTANEOUS
Status: DISCONTINUED | OUTPATIENT
Start: 2023-02-03 | End: 2023-02-06

## 2023-02-03 RX ORDER — METOPROLOL TARTRATE 25 MG/1
12.5 TABLET, FILM COATED ORAL EVERY 12 HOURS
Status: DISCONTINUED | OUTPATIENT
Start: 2023-02-03 | End: 2023-02-06

## 2023-02-03 RX ORDER — VERAPAMIL HYDROCHLORIDE 2.5 MG/ML
INJECTION, SOLUTION INTRAVENOUS AS NEEDED
Status: DISCONTINUED | OUTPATIENT
Start: 2023-02-03 | End: 2023-02-03 | Stop reason: HOSPADM

## 2023-02-03 RX ADMIN — SODIUM CHLORIDE 100 ML/HR: 9 INJECTION, SOLUTION INTRAVENOUS at 08:40

## 2023-02-03 RX ADMIN — AMIODARONE HYDROCHLORIDE 400 MG: 200 TABLET ORAL at 17:00

## 2023-02-03 RX ADMIN — ASPIRIN 81 MG: 81 TABLET, CHEWABLE ORAL at 08:55

## 2023-02-03 RX ADMIN — PERFLUTREN 2 ML: 6.52 INJECTION, SUSPENSION INTRAVENOUS at 14:38

## 2023-02-03 RX ADMIN — MUPIROCIN: 20 OINTMENT TOPICAL at 18:25

## 2023-02-03 RX ADMIN — SODIUM CHLORIDE, PRESERVATIVE FREE 10 ML: 5 INJECTION INTRAVENOUS at 22:05

## 2023-02-03 RX ADMIN — METOPROLOL TARTRATE 12.5 MG: 25 TABLET, FILM COATED ORAL at 22:01

## 2023-02-03 RX ADMIN — Medication 2 UNITS: at 22:04

## 2023-02-03 NOTE — Clinical Note
TRANSFER - IN REPORT:     Verbal report received from: Jesi Ness RN. Report consisted of patient's Situation, Background, Assessment and   Recommendations(SBAR). Opportunity for questions and clarification was provided. Assessment completed upon patient's arrival to unit and care assumed. Patient transported with a Cardiac Cath Tech / Patient Care Tech.

## 2023-02-03 NOTE — H&P
Please see clinic note from 1/29/23for detail. Patient with multiple risk factos and now with symptoms concerning for angina in accelarated patter responsive to NTG. I saw and examined patient and confirmed above. No interval change. Labs reviewed. Procedure explained to patient and all risk and benefit discussed with patient. Risk, benefit, complication of LHC and possible PCI ( including but not limited to bleeding, infection, heart failure, stroke, MI, emergent bypass surgery, kidney failure, dialysis and death ) were discussed with patient and willing to proceed with procedure. Proceed as planned. DW  at bedside      History and physical has been reviewed.  There have been no significant clinical changes since the completion of the originally dated History and Physical.  Will be using moderate sedation.    ------------------------------------------------------------------------------------------------------------------

## 2023-02-03 NOTE — CONSULTS
[facility]  -  Consult      2/3/2023    I saw and examined the patient. I agree with history and physical examination as well as assessment documented below. 26-year-old patient with diabetes of long duration on oral antidiabetics, hypertension, hyperlipidemia. Patient was being worked up for chest pressure with activity in the last 2 weeks. The patient states that the intensity of the pain has increased and the amount of exercise she has to do has decreased in the last 2 weeks. Patient had left heart catheterization with left and right coronary angiography today by Dr. Stephen Lin. I had the chance to review images personally in the cardiac catheterization laboratory and discuss it with Dr. Shiloh Cheatham as well as Dr. Valorie Saenz. Patient has significant ostial left mainstem coronary artery stenosis which appears to be an irregular plaque and hazy lesion in character. Patient also has significant calcification of the proximal LAD over a long segment at the takeoff of a moderate-sized diagonal branch. Right coronary has multiple lesions. All 3 coronary systems are acceptable targets for revascularization. Left ventricular ejection fraction appears to be well preserved on the left ventriculogram.  An echocardiogram is pending. All things considered, having significant left mainstem and right coronary artery stenosis, diabetes mellitus, increasing symptomatic status, patient will benefit from coronary artery bypass graft surgery. I spoke to the patient in great detail regarding the extent of coronary artery disease, treatment options including continuing medical therapy, percutaneous coronary interventions, surgical revascularization was discussed in great detail. Risks and benefits of all 3 treatment options was discussed with the patient in great detail.   The procedure coronary artery bypass graft surgery, postprocedure hospital stay, recuperation after discharge from the hospital was discussed with the patient in great detail. Risks of the procedure including but not limited to infection the sternotomy incision which might require debridement, bleeding which may require blood transfusion in the reexploration operating, preoperative myocardial infarction, preoperative stroke, perioperative death was discussed with the patient in great detail. Patient understands and she is ready to proceed. I answered all of her questions to her satisfaction. The patient will have a CT scan of the chest without contrast, bilateral carotid duplex ultrasounds as well as bilateral lower extremity venous mapping in preparation for coronary artery bypass grafting. Shen Dumont is a 79 y.o. female who is being seen on consult for CAD, at  Dr. Jose Luis Alonso. Burke's request.        Assessment:   3VCAD w/ LM ds  Obesity BMI > 30  HTN  DM  HLD  Hypothyroidism  H/o varicose veins in legs      Patient Active Problem List    Diagnosis Date Noted    CAD (coronary artery disease), native coronary artery 02/03/2023    Cellulitis of right foot due to methicillin-resistant Staphylococcus aureus 02/03/2023    Essential hypertension 02/03/2023    Mixed hyperlipidemia 02/03/2023    Osteoarthrosis 02/03/2023    Obesity 02/03/2023    Type 2 diabetes mellitus without complication ([facility] [address] 84/47/6559    Varicose veins of left lower extremity 02/03/2023    Fatty (change of) liver, not elsewhere classified 01/31/2012    Fibromyalgia 01/31/1995    Hypothyroidism 01/30/1991     Cardiac risk factors:  dyslipidemia, diabetes mellitus, obesity,  hypertension, stress, thyroid dysfunction  Plan:     Would benefit from CABG, planned for 2/6/23  Hold ACEi/ARB to avoid perioperative vasoplegic syndrome. Will clarify COVID vaccination status  Will also need the following tests to complete the workup and risk stratification.   COVID rule out  Type and Cross 2 units PRBC, 1 unit PLT  Lipid panel  Liver function tests  TSH  HbA1c  EKG  CXR  Chest CT scan  Echocardiogram  Carotid duplex scanning  Venous mapping and marking h/o varicose vein  Amiodarone for Atrial Fibrillation prophylaxis     Subjective:   No chief complaint on file. Chest pain    History of Present Illness:   Gutierrez Saenz is a 79 y.o. female with history of HTN, HLD, DM, hpothyroidism who is having increasing angina with activity. The chest pain pressure is moderate and is located substernally without radiation to the arms. It is associated with exertion It has been present for 2 weeks, and is increasing in frequency and severity, and is not relieved by rest and does not occur at rest. She denies claudication, dizziness, dyspnea, irregular heart beats, lower extremity edema, near-syncope. She had elective cardiac cath today and was found to have 3VCAD with Left main disease. She will be admitted for surgical revascularization. Past Medical History:   Diagnosis Date    Diabetes ([address]     Fibromyalgia     HTN (hypertension)     Hypothyroid     Nausea & vomiting     Obesity     Varicose veins of both lower extremities        Past Surgical History:   Procedure Laterality Date    COLONOSCOPY N/A 10/7/2020    COLONOSCOPY, biopsy, polypectomies performed by Haile Andrade MD at [facility] ENDOSCOPY    HX CHOLECYSTECTOMY      HX COLONOSCOPY      age 48    HX GYN      colposcopy and portion cervix removed    HX HEENT      tonsillectomy    HX HERNIA REPAIR      abdominal       Social History:   She  reports that she has never smoked. She has never used smokeless tobacco.  She  reports no history of alcohol use. She lives with her  who is a . Family History:   No family history pertinent to CAD. Allergies and Intolerances:      Allergies   Allergen Reactions    Compazine [Prochlorperazine Edisylate] Angioedema    Aspirin Hives       Home Medications:     Current Facility-Administered Medications   Medication Dose Route Frequency Provider Last Rate Last Admin    0.9% sodium chloride infusion  100 mL/hr IntraVENous CONTINUOUS Debbie Leggett  mL/hr at 23 0840 100 mL/hr at 23 0840    sodium chloride (NS) flush 5-40 mL  5-40 mL IntraVENous Q8H Laura Davis PA-C        sodium chloride (NS) flush 5-40 mL  5-40 mL IntraVENous PRN Raquel Davis PA-C        mupirocin (BACTROBAN) 2 % ointment   Both Nostrils BID Donell Adan PA-C        [START ON 2023] ceFAZolin (ANCEF) 2 g in sterile water (preservative free) 20 mL IV syringe  2 g IntraVENous ON CALL TO OR Raquel Davis PA-C        amiodarone (CORDARONE) tablet 400 mg  400 mg Oral TID WITH MEALS Raquel Davis PA-C        Followed by    Germ[place] ON 2023] amiodarone (CORDARONE) tablet 200 mg  200 mg Oral BID WITH MEALS Raquel Davis PA-C         Prior to Admission Medications   Prescriptions Last Dose Informant Patient Reported? Taking? LEVOTHYROXINE SODIUM (LEVOTHYROXINE PO) 2/3/2023 at 0600  Yes Yes   Sig: Take 100 mcg by mouth daily. LISINOPRIL PO 2/3/2023 at 0600  Yes Yes   Sig: Take 10 mg by mouth daily. aspirin 81 mg chewable tablet 2022 at AM  Yes No   Si tablet   metFORMIN (GLUCOPHAGE) 500 mg tablet 2023 at AM  Yes No   Sig: Take  by mouth two (2) times daily (with meals). metoprolol succinate (TOPROL-XL) 25 mg XL tablet 2023 at AM  No No   Sig: Take 1 Tablet by mouth daily. nitroglycerin (NITROSTAT) 0.4 mg SL tablet Not Taking  No No   Si Tab by SubLINGual route every five (5) minutes as needed for Chest Pain. Up to 3 doses. Patient not taking: Reported on 2/3/2023      Facility-Administered Medications: None      No current outpatient medications on file.         Review of Systems: (NEGATIVE unless checked or in HPI.)   Cardiac:   [] Chest pain or chest pressure  [] Shortness of breath upon activity  [] Shortness of breath when lying flat  [] Irregular heart rhythm     Vascular:   [] Pain in calf, thigh, or hip brought on by walking  [] Pain in feet at night that wakes you up from your sleep  [] Blood clot in your veins  [] Leg swelling  [] bulging leg veins     Pulmonary:   [] Oxygen at home  [] Productive cough  [] Wheezing     Neurologic:   [] Sudden weakness in arms or legs  [] Sudden numbness in arms or legs  [] Sudden onset of difficult speaking or slurred speech  [] Temporary loss of vision in one eye  [] Problems with dizziness    Gastrointestinal:   [] Blood in stool  [] Vomited blood    Genitourinary:   [] Burning when urinating  [] Blood in urine     Psychiatric:   [] Major depression     Hematologic:   [] Bleeding problems  [] Problems with blood clotting  [] bulging leg veins  [] calf pain with exertion    Dermatologic:   [] Rashes or ulcers     Constitutional:   [] Fever or chills  [] weight gain or loss     Ear/Nose/Throat:   [] Dentition   [] Change in hearing  [] Nose bleeds  [] Sore throat     Musculoskeletal:   [] Back pain  [] Joint pain  [] Muscle pain    Physical Examination:   Visit Vitals  BP (!) 152/74   Pulse 69   Temp 98.1 °F (36.7 °C)   Resp 11   Ht 5' 6\" (1.676 m)   Wt 72.6 kg (160 lb)   SpO2 97%   Breastfeeding No   BMI 25.82 kg/m²     PHYSICAL EXAMINATION:  Vital signs:   BP Readings from Last 3 Encounters:   23 (!) 152/74   23 (!) 186/93   20 (!) 122/90     Temp (24hrs), Av.1 °F (36.7 °C), Min:98.1 °F (36.7 °C), Max:98.1 °F (36.7 °C)    Wt Readings from Last 3 Encounters:   23 72.6 kg (160 lb)   23 75.3 kg (166 lb)   10/28/20 71.7 kg (158 lb)     Ht Readings from Last 3 Encounters:   23 5' 6\" (1.676 m)   10/28/20 5' (1.524 m)   10/01/20 5' (1.524 m)     Body surface area is 1.84 meters squared. General:   awake alert and oriented   Skin:   no rashes or skin lesions noted on limited exam   HEENT:  Normocephalic, atraumatic, PERRL, EOMI, no scleral icterus or pallor; no conjunctival hemmohage;  nasal and oral mucous are moist and without evidence of lesions. No thrush. Neck supple, no bruits. Lymph Nodes:   no cervical, axillary or inguinal adenopathy   Lungs:   non-labored, bilaterally clear to aspiration- no crackles wheezes rales or rhonchi   Heart:  RRR, s1 and s2; no murmurs rubs or gallops, no edema, + pedal pulses   Abdomen:  soft, non-distended, active bowel sounds, no hepatomegaly, no splenomegaly. Appropriate surgical scars for stated surgeries. Non-tender   Genitourinary:  deferred   Extremities:   no clubbing, cyanosis; no joint effusions or swelling; Full ROM of all large joints to the upper and lower extremities; muscle mass appropriate for age   Neurologic:  No gross focal sensory abnormalities; 5/5 muscle strength to upper and lower extremities. Speech appropirate. Cranial nerves intact   Psychiatric:   appropriate and interactive. Laboratory Data:     Lab Results   Component Value Date/Time    WBC 5.7 02/01/2023 01:10 PM    HGB 12.6 02/01/2023 01:10 PM    HCT 38.3 02/01/2023 01:10 PM    PLATELET 568 82/56/9804 01:10 PM     Lab Results   Component Value Date/Time    Sodium 139 02/01/2023 01:10 PM    Potassium 4.0 02/01/2023 01:10 PM    Chloride 107 02/01/2023 01:10 PM    CO2 28 02/01/2023 01:10 PM    Glucose 163 (H) 02/01/2023 01:10 PM    BUN 13 02/01/2023 01:10 PM    Creatinine 0.67 02/01/2023 01:10 PM     Lab Results   Component Value Date/Time    Cholesterol, total 207 (H) 10/15/2013 05:28 PM    HDL Cholesterol 47 10/15/2013 05:28 PM    LDL, calculated 127.6 (H) 10/15/2013 05:28 PM    Triglyceride 162 (H) 10/15/2013 05:28 PM     Lab Results   Component Value Date/Time    Hemoglobin A1c 7.8 (H) 10/01/2013 02:01 PM     Recent Labs     02/01/23  1310   CA 8.7   ALB 3.9   TP 6.4   TBILI 0.5     ABG:  No results found for: PH, PHI, PCO2, PCO2I, PO2, PO2I, HCO3, HCO3I, FIO2, FIO2I  No results for input(s): TROIQ, CPK, CKMB in the last 72 hours.   Lab Results   Component Value Date/Time    TSH 4.76 10/15/2013 05:28 PM       EKG: (independently reviewed)   EKG Results       None ECHO: 11/16/18    ECHO ADULT COMPLETE 11/16/2018 11/16/2018    Interpretation Summary  · Left ventricular low normal systolic function. Estimated left ventricular ejection fraction is 51 - 55%. Visually measured ejection fraction. Normal left ventricular wall motion, no regional wall motion abnormality noted. Slight septal bounce noted. Age-appropriate left ventricular diastolic function. · Mild aortic valve sclerosis with no significant stenosis. · Trace mitral valve regurgitation. · Mild tricuspid valve regurgitation is present. There is no evidence of pulmonary hypertension. · Mildly elevated central venous pressure (5-10 mmHg); IVC diameter is less than 21 mm and collapses less than 50% with respiration. Signed by: Mark Sol MD on 11/16/2018  1:59 PM      CATHETERIZATION:   Completed but no report    Nithya Moreno PA-C  [facility]  [phone]    PLEASE NOTE:  This document has been produced using voice recognition software. Unrecognized errors in transcription may be present. NOTE TO PATIENT:  The purpose of this note is to communicate optimally with the other providers involved in your care. It is written using standard medical terminology. If you have questions regarding details of the note please call my office at [phone] and make an appointment to discuss your concerns.

## 2023-02-03 NOTE — ROUTINE PROCESS
TRANSFER - OUT REPORT:    Verbal report given to Luis Jose RN(name) on Livier Pages  being transferred to 2302(unit) for routine progression of care       Report consisted of patients Situation, Background, Assessment and   Recommendations(SBAR). Information from the following report(s) SBAR was reviewed with the receiving nurse. Lines:   Peripheral IV 02/03/23 Posterior;Right Forearm (Active)   Site Assessment Clean, dry, & intact 02/03/23 0840   Phlebitis Assessment 0 02/03/23 0840   Infiltration Assessment 0 02/03/23 0840   Dressing Status Clean, dry, & intact 02/03/23 0840   Dressing Type Transparent 02/03/23 0840   Hub Color/Line Status Pink;Flushed 02/03/23 0840       Peripheral IV 02/03/23 Left;Posterior Forearm (Active)   Site Assessment Clean, dry, & intact 02/03/23 0840   Phlebitis Assessment 0 02/03/23 0840   Infiltration Assessment 0 02/03/23 0840   Dressing Status Clean, dry, & intact 02/03/23 0840   Dressing Type Transparent 02/03/23 0840   Hub Color/Line Status Pink;Flushed 02/03/23 0840        Opportunity for questions and clarification was provided. Patient transported with:   Registered Nurse: John Ashley.  Domingo Mclean

## 2023-02-03 NOTE — PROGRESS NOTES
Right wrist TR Band removed, no bleeding or swelling. Sterile hemostatic dressing applied. Normal radial pulse, normal distal circulation and neuro check. Safety splint applied. Safety instructions reviewed with the patient.

## 2023-02-03 NOTE — Clinical Note
TRANSFER - OUT REPORT:     Verbal report given to: (at bedside). Report consisted of patient's Situation, Background, Assessment and   Recommendations(SBAR). Opportunity for questions and clarification was provided. Patient transported with a Cardiac Cath Tech / Patient Care Tech. Patient transported to: holding area.

## 2023-02-03 NOTE — Clinical Note
Contrast Dose Calculator:   Patient's age: 79.   Patient's sex: Female. Patient weight (kg) = 72.6. Creatinine level (mg/dL) = 0.67. Creatinine clearance (mL/min): 93.   Contrast concentration (mg/mL) = 300. MACD = 300 mL. Max Contrast dose per Creatinine Cl calculator = 209.25 mL.

## 2023-02-03 NOTE — Clinical Note
History     Chief Complaint   Patient presents with     Catheter Problem     home nurse unable to place a elena; no other concerns      HPI  Simone Daniels is a 78 year old male who presents to the emergency department due to indwelling catheter problem.  Elena was removed and home care nurse was unable to replace Elena so he presents today for new Elena catheter insertion.  No other concerns or complaints at this time.  Patient otherwise well and at personal baseline.    Allergies:  No Known Allergies    Problem List:    Patient Active Problem List    Diagnosis Date Noted     2019 novel coronavirus disease (COVID-19) 01/12/2021     Priority: Medium     Elena catheter in place 01/01/2021     Priority: Medium     Long term current use of anticoagulant therapy 01/01/2021     Priority: Medium     PEG (percutaneous endoscopic gastrostomy) status (H) 01/01/2021     Priority: Medium     Late effects of cerebral ischemic stroke 01/01/2021     Priority: Medium     Elevated prostate specific antigen (PSA) 04/19/2019     Priority: Medium     Urge incontinence of urine 04/19/2019     Priority: Medium     Other abnormalities of gait and mobility 12/17/2018     Priority: Medium     Other symbolic dysfunctions 12/17/2018     Priority: Medium     Urinary retention with incomplete bladder emptying 12/11/2018     Priority: Medium     Other constipation 12/11/2018     Priority: Medium     Nasal congestion 12/11/2018     Priority: Medium     Unspecified atrial fibrillation (H) 12/11/2018     Priority: Medium     Cerebral infarction due to unspecified occlusion or stenosis of left posterior cerebral artery (H) 12/11/2018     Priority: Medium     Essential (primary) hypertension 12/11/2018     Priority: Medium     Bilateral primary osteoarthritis of knee 12/11/2018     Priority: Medium     Pain in left knee 12/11/2018     Priority: Medium     Retention of urine, unspecified 12/11/2018     Priority: Medium     Unspecified injury of  Sheath #1: Closed using R-Band. Radial band pressure set at: 10. right eye and orbit, initial encounter 2018     Priority: Medium     Weakness 2018     Priority: Medium     Left parietal hemorrhagic infarction (H) 2018     Priority: Medium     Other postherpetic nervous system involvement 2017     Priority: Medium     CARDIOVASCULAR SCREENING; LDL GOAL LESS THAN 160 10/31/2010     Priority: Medium     Gastro-esophageal reflux disease without esophagitis 10/19/2006     Priority: Medium     Left knee osteoarthritis 2006     Priority: Medium     Anxiety state 2005     Priority: Medium     Problem list name updated by automated process. Provider to review          Past Medical History:    Past Medical History:   Diagnosis Date     Cerebral infarction (H)      LOW BACK PAIN        Past Surgical History:    Past Surgical History:   Procedure Laterality Date     IR GASTROSTOMY TUBE PERCUTANEOUS PLCMNT  12/3/2020     SURGICAL HISTORY OF -       Cervical disc (5,6,&7)     SURGICAL HISTORY OF -       (L) Knee fx tibial plateau     SURGICAL HISTORY OF -       (L) Knee surgery - re-fx tibial plateau       Family History:    Family History   Problem Relation Age of Onset     C.A.D. Mother         heart disease in her 60s     C.A.D. Brother         1/2 brother with heart disease     Diabetes No family hx of        Social History:  Marital Status:  Single [1]  Social History     Tobacco Use     Smoking status: Former Smoker     Packs/day: 2.00     Years: 18.00     Pack years: 36.00     Types: Cigarettes     Quit date: 1980     Years since quittin.2     Smokeless tobacco: Never Used   Substance Use Topics     Alcohol use: Yes     Frequency: 2-4 times a month     Drinks per session: 1 or 2     Binge frequency: Never     Comment: occaisonal     Drug use: No        Medications:    acetaminophen (TYLENOL) 325 MG tablet  alum & mag hydroxide-simethicone (MAALOX  ES) 400-400-40 MG/5ML SUSP suspension  apixaban ANTICOAGULANT (ELIQUIS) 5 MG  tablet  Cranberry 450 MG TABS  doxazosin (CARDURA) 4 MG tablet  folic acid (FOLVITE) 1 MG tablet  melatonin 3 MG tablet  metoprolol tartrate (LOPRESSOR) 25 MG tablet  multivitamin w/minerals (THERA-VIT-M) tablet  Nutritional Supplements (JEVITY 1.5 SANDRA PO)  polyethylene glycol (MIRALAX) 17 g packet  saline nasal (AYR SALINE) GEL topical gel  STATIN NOT PRESCRIBED (INTENTIONAL)          Review of Systems   All other systems reviewed and are negative.  As per HPI and PMHx, otherwise 10+ comprehensive system review is negative.      Physical Exam   BP: 111/69  Pulse: 63  Temp: 97.1  F (36.2  C)  Resp: 16  SpO2: 99 %      Physical Exam  Constitutional:       General: He is not in acute distress.     Appearance: Normal appearance.   Cardiovascular:      Rate and Rhythm: Normal rate.   Pulmonary:      Effort: Pulmonary effort is normal.   Abdominal:      General: There is no distension.      Palpations: Abdomen is soft.      Tenderness: There is no abdominal tenderness.   Musculoskeletal: Normal range of motion.   Skin:     General: Skin is warm.      Capillary Refill: Capillary refill takes less than 2 seconds.   Neurological:      Mental Status: He is alert.       ED Course        Procedures  No results found for this or any previous visit (from the past 24 hour(s)).    Medications - No data to display    Assessments & Plan (with Medical Decision Making)   Patient is an 8-year-old male who presents from home for Flores catheter replacement.  Emergency department nurse able to replace Flores catheter without difficulty.  Clear yellow urine draining without difficulty.  Patient discharged in good condition.    I have reviewed the nursing notes.    I have reviewed the findings, diagnosis, plan and need for follow up with the patient.    Discharge Medication List as of 3/26/2021  6:42 PM        Final diagnoses:   Encounter for Flores catheter replacement     3/26/2021   [facility] EMERGENCY DEPT     Kaylie Salcedo  CAROLANN Dennis CNP  04/05/21 1267

## 2023-02-04 LAB
ALBUMIN SERPL-MCNC: 3.3 G/DL (ref 3.4–5)
ALBUMIN/GLOB SERPL: 1.4 (ref 0.8–1.7)
ALP SERPL-CCNC: 88 U/L (ref 45–117)
ALT SERPL-CCNC: 41 U/L (ref 13–56)
ANION GAP SERPL CALC-SCNC: 5 MMOL/L (ref 3–18)
AST SERPL-CCNC: 37 U/L (ref 10–38)
BASOPHILS # BLD: 0 K/UL (ref 0–0.1)
BASOPHILS NFR BLD: 1 % (ref 0–2)
BILIRUB DIRECT SERPL-MCNC: 0.2 MG/DL (ref 0–0.2)
BILIRUB SERPL-MCNC: 0.7 MG/DL (ref 0.2–1)
BUN SERPL-MCNC: 9 MG/DL (ref 7–18)
BUN/CREAT SERPL: 14 (ref 12–20)
CALCIUM SERPL-MCNC: 8.7 MG/DL (ref 8.5–10.1)
CHLORIDE SERPL-SCNC: 108 MMOL/L (ref 100–111)
CHOLEST SERPL-MCNC: 185 MG/DL
CO2 SERPL-SCNC: 28 MMOL/L (ref 21–32)
CREAT SERPL-MCNC: 0.65 MG/DL (ref 0.6–1.3)
DIFFERENTIAL METHOD BLD: ABNORMAL
EOSINOPHIL # BLD: 0.3 K/UL (ref 0–0.4)
EOSINOPHIL NFR BLD: 6 % (ref 0–5)
ERYTHROCYTE [DISTWIDTH] IN BLOOD BY AUTOMATED COUNT: 12.5 % (ref 11.6–14.5)
EST. AVERAGE GLUCOSE BLD GHB EST-MCNC: 166 MG/DL
GLOBULIN SER CALC-MCNC: 2.3 G/DL (ref 2–4)
GLUCOSE BLD STRIP.AUTO-MCNC: 107 MG/DL (ref 70–110)
GLUCOSE BLD STRIP.AUTO-MCNC: 140 MG/DL (ref 70–110)
GLUCOSE BLD STRIP.AUTO-MCNC: 158 MG/DL (ref 70–110)
GLUCOSE BLD STRIP.AUTO-MCNC: 205 MG/DL (ref 70–110)
GLUCOSE SERPL-MCNC: 145 MG/DL (ref 74–99)
HBA1C MFR BLD: 7.4 % (ref 4.2–5.6)
HCT VFR BLD AUTO: 36.7 % (ref 35–45)
HDLC SERPL-MCNC: 40 MG/DL (ref 40–60)
HDLC SERPL: 4.6 (ref 0–5)
HGB BLD-MCNC: 12.3 G/DL (ref 12–16)
HISTORY CHECKED?,CKHIST: NORMAL
IMM GRANULOCYTES # BLD AUTO: 0 K/UL (ref 0–0.04)
IMM GRANULOCYTES NFR BLD AUTO: 0 % (ref 0–0.5)
LDLC SERPL CALC-MCNC: 108.6 MG/DL (ref 0–100)
LEFT CCA DIST DIAS: 16.5 CM/S
LEFT CCA DIST SYS: 60.1 CM/S
LEFT CCA MID DIAS: 20.85 CM/S
LEFT CCA MID SYS: 64.46 CM/S
LEFT CCA PROX DIAS: 25.2 CM/S
LEFT CCA PROX SYS: 87.8 CM/S
LEFT ECA DIAS: 13.03 CM/S
LEFT ECA SYS: 65.7 CM/S
LEFT GSV ANKLE DIAM: 0.17 CM
LEFT GSV AT KNEE DIAM: 0.24 CM
LEFT GSV BK DIST DIAM: 0.16 CM
LEFT GSV BK MID DIAM: 0.34 CM
LEFT GSV BK PROX DIAM: 0.3 CM
LEFT GSV JUNC DIAM: 0.38 CM
LEFT GSV THIGH DIST DIAM: 0.26 CM
LEFT GSV THIGH MID DIAM: 0.21 CM
LEFT GSV THIGH PROX DIAM: 0.23 CM
LEFT ICA DIST DIAS: 29.2 CM/S
LEFT ICA DIST SYS: 91.9 CM/S
LEFT ICA MID DIAS: 21.1 CM/S
LEFT ICA MID SYS: 70.2 CM/S
LEFT ICA PROX DIAS: 18.8 CM/S
LEFT ICA PROX SYS: 72.9 CM/S
LEFT ICA/CCA SYS: 1.53 NO UNITS
LEFT SSV DIST DIAM: 0.16 CM
LEFT SSV MID DIAM: 0.14 CM
LEFT SSV PROX DIAM: 0.17 CM
LEFT VERTEBRAL DIAS: 23.41 CM/S
LEFT VERTEBRAL SYS: 75.1 CM/S
LIPID PROFILE,FLP: ABNORMAL
LYMPHOCYTES # BLD: 1.4 K/UL (ref 0.9–3.6)
LYMPHOCYTES NFR BLD: 31 % (ref 21–52)
MCH RBC QN AUTO: 31.4 PG (ref 24–34)
MCHC RBC AUTO-ENTMCNC: 33.5 G/DL (ref 31–37)
MCV RBC AUTO: 93.6 FL (ref 78–100)
MONOCYTES # BLD: 0.4 K/UL (ref 0.05–1.2)
MONOCYTES NFR BLD: 9 % (ref 3–10)
NEUTS SEG # BLD: 2.4 K/UL (ref 1.8–8)
NEUTS SEG NFR BLD: 53 % (ref 40–73)
NRBC # BLD: 0 K/UL (ref 0–0.01)
NRBC BLD-RTO: 0 PER 100 WBC
PLATELET # BLD AUTO: 198 K/UL (ref 135–420)
PMV BLD AUTO: 10.4 FL (ref 9.2–11.8)
POTASSIUM SERPL-SCNC: 3.7 MMOL/L (ref 3.5–5.5)
PROT SERPL-MCNC: 5.6 G/DL (ref 6.4–8.2)
RBC # BLD AUTO: 3.92 M/UL (ref 4.2–5.3)
RIGHT CCA DIST DIAS: 13.6 CM/S
RIGHT CCA DIST SYS: 44.3 CM/S
RIGHT CCA MID DIAS: 18.43 CM/S
RIGHT CCA MID SYS: 59.72 CM/S
RIGHT CCA PROX DIAS: 15.6 CM/S
RIGHT CCA PROX SYS: 50.5 CM/S
RIGHT ECA DIAS: 8.13 CM/S
RIGHT ECA SYS: 53 CM/S
RIGHT GSV ANKLE DIAM: 0.2 CM
RIGHT GSV AT KNEE DIAM: 0.27 CM
RIGHT GSV BK DIST DIAM: 0.18 CM
RIGHT GSV BK MID DIAM: 0.19 CM
RIGHT GSV BK PROX DIAM: 0.29 CM
RIGHT GSV JUNC DIAM: 0.33 CM
RIGHT GSV THIGH DIST DIAM: 0.3 CM
RIGHT GSV THIGH MID DIAM: 0.37 CM
RIGHT GSV THIGH PROX DIAM: 0.34 CM
RIGHT ICA DIST DIAS: 20.9 CM/S
RIGHT ICA DIST SYS: 69.2 CM/S
RIGHT ICA MID DIAS: 16.1 CM/S
RIGHT ICA MID SYS: 51.7 CM/S
RIGHT ICA PROX DIAS: 16.8 CM/S
RIGHT ICA PROX SYS: 51 CM/S
RIGHT ICA/CCA SYS: 1.6 NO UNITS
RIGHT SSV MID DIAM: 0.17 CM
RIGHT SSV PROX DIAM: 0.21 CM
RIGHT VERTEBRAL DIAS: 12.36 CM/S
RIGHT VERTEBRAL SYS: 51.3 CM/S
SODIUM SERPL-SCNC: 141 MMOL/L (ref 136–145)
TRIGL SERPL-MCNC: 182 MG/DL (ref ?–150)
TSH SERPL DL<=0.05 MIU/L-ACNC: 1.4 UIU/ML (ref 0.36–3.74)
VAS LEFT SUBCLAVIAN PROX EDV: 0 CM/S
VAS LEFT SUBCLAVIAN PROX PSV: 78.4 CM/S
VAS RIGHT SUBCLAVIAN PROX EDV: 0 CM/S
VAS RIGHT SUBCLAVIAN PROX PSV: 73.5 CM/S
VLDLC SERPL CALC-MCNC: 36.4 MG/DL
WBC # BLD AUTO: 4.5 K/UL (ref 4.6–13.2)

## 2023-02-04 PROCEDURE — 83036 HEMOGLOBIN GLYCOSYLATED A1C: CPT

## 2023-02-04 PROCEDURE — 94762 N-INVAS EAR/PLS OXIMTRY CONT: CPT

## 2023-02-04 PROCEDURE — 82962 GLUCOSE BLOOD TEST: CPT

## 2023-02-04 PROCEDURE — 80076 HEPATIC FUNCTION PANEL: CPT

## 2023-02-04 PROCEDURE — 36415 COLL VENOUS BLD VENIPUNCTURE: CPT

## 2023-02-04 PROCEDURE — 74011250637 HC RX REV CODE- 250/637: Performed by: PHYSICIAN ASSISTANT

## 2023-02-04 PROCEDURE — 9990 CHARGE CONVERSION

## 2023-02-04 PROCEDURE — 99232 SBSQ HOSP IP/OBS MODERATE 35: CPT | Performed by: INTERNAL MEDICINE

## 2023-02-04 PROCEDURE — 86900 BLOOD TYPING SEROLOGIC ABO: CPT

## 2023-02-04 PROCEDURE — 74011636637 HC RX REV CODE- 636/637: Performed by: PHYSICIAN ASSISTANT

## 2023-02-04 PROCEDURE — 85025 COMPLETE CBC W/AUTO DIFF WBC: CPT

## 2023-02-04 PROCEDURE — 65270000029 HC RM PRIVATE

## 2023-02-04 PROCEDURE — 65660000004 HC RM CVT STEPDOWN

## 2023-02-04 PROCEDURE — 80048 BASIC METABOLIC PNL TOTAL CA: CPT

## 2023-02-04 PROCEDURE — 86920 COMPATIBILITY TEST SPIN: CPT

## 2023-02-04 PROCEDURE — 74011000250 HC RX REV CODE- 250: Performed by: PHYSICIAN ASSISTANT

## 2023-02-04 PROCEDURE — 84443 ASSAY THYROID STIM HORMONE: CPT

## 2023-02-04 PROCEDURE — 86901 BLOOD TYPING SEROLOGIC RH(D): CPT

## 2023-02-04 PROCEDURE — 99232 SBSQ HOSP IP/OBS MODERATE 35: CPT | Performed by: PHYSICIAN ASSISTANT

## 2023-02-04 PROCEDURE — APPNB30 APP NON BILLABLE TIME 0-30 MINS: Performed by: PHYSICIAN ASSISTANT

## 2023-02-04 PROCEDURE — 80061 LIPID PANEL: CPT

## 2023-02-04 PROCEDURE — 86850 RBC ANTIBODY SCREEN: CPT

## 2023-02-04 RX ORDER — ROSUVASTATIN CALCIUM 20 MG/1
20 TABLET, COATED ORAL
Status: DISCONTINUED | OUTPATIENT
Start: 2023-02-04 | End: 2023-02-06

## 2023-02-04 RX ADMIN — Medication 4 UNITS: at 21:18

## 2023-02-04 RX ADMIN — ASPIRIN 81 MG: 81 TABLET, COATED ORAL at 09:16

## 2023-02-04 RX ADMIN — METOPROLOL TARTRATE 12.5 MG: 25 TABLET, FILM COATED ORAL at 21:18

## 2023-02-04 RX ADMIN — ROSUVASTATIN CALCIUM 20 MG: 20 TABLET, COATED ORAL at 21:19

## 2023-02-04 RX ADMIN — MUPIROCIN: 20 OINTMENT TOPICAL at 09:29

## 2023-02-04 RX ADMIN — SODIUM CHLORIDE, PRESERVATIVE FREE 10 ML: 5 INJECTION INTRAVENOUS at 14:00

## 2023-02-04 RX ADMIN — SODIUM CHLORIDE, PRESERVATIVE FREE 10 ML: 5 INJECTION INTRAVENOUS at 06:00

## 2023-02-04 RX ADMIN — METOPROLOL TARTRATE 12.5 MG: 25 TABLET, FILM COATED ORAL at 09:36

## 2023-02-04 RX ADMIN — SODIUM CHLORIDE, PRESERVATIVE FREE 10 ML: 5 INJECTION INTRAVENOUS at 21:19

## 2023-02-04 RX ADMIN — MUPIROCIN: 20 OINTMENT TOPICAL at 18:20

## 2023-02-04 RX ADMIN — LEVOTHYROXINE SODIUM 100 MCG: 100 TABLET ORAL at 09:16

## 2023-02-04 RX ADMIN — Medication 2 UNITS: at 12:59

## 2023-02-04 NOTE — ROUTINE PROCESS
TRANSFER - IN REPORT:    Verbal report received from Saw Rosado RN on Erin Azul  being received from cath holding for routine progression of care      Report consisted of patients Situation, Background, Assessment and   Recommendations(SBAR). Information from the following report(s) SBAR, MAR, Recent Results, and Cardiac Rhythm SR  was reviewed with the receiving nurse. Opportunity for questions and clarification was provided. Assessment completed upon patients arrival to unit and care assumed. Wound Prevention Checklist    Patient: Erin Azul (77 y.o. female)  Date: 2/3/2023  Diagnosis: Chest pain, unspecified type [R07.9]  CAD (coronary artery disease), native coronary artery [I25.10] <principal problem not specified>    Precautions:         []  Heel prevention boots placed on patient    []  Patient turned q2h during shift    []  Lift team ordered    [x]  Patient on Mckenna bed/Specialty bed    []  Each Wound is documented during shift (Stage, Color, drainage, odor, measurements, and dressings)    Patient has no noted pressure injuries. Patient has a right radial cath entry site. Dressing applied during procedure. Dressing is clean, dry and intact, No bleeding or hematoma present.      [x]  Dual skin check done with ERNESTO Headley RN

## 2023-02-04 NOTE — PROGRESS NOTES
conducted an initial consultation and Spiritual Assessment for Ursula Sparks, who is a 79 y.o.,female. Patients Primary Language is: [place]. According to the patients EMR Buddhism Affiliation is: [facility].     The reason the Patient came to the hospital is:   Patient Active Problem List    Diagnosis Date Noted    CAD (coronary artery disease), native coronary artery 02/03/2023    Cellulitis of right foot due to methicillin-resistant Staphylococcus aureus 02/03/2023    Essential hypertension 02/03/2023    Mixed hyperlipidemia 02/03/2023    Osteoarthrosis 02/03/2023    Obesity 02/03/2023    Type 2 diabetes mellitus without complication ([facility] [address] 94/60/3874    Varicose veins of left lower extremity 02/03/2023    Fatty (change of) liver, not elsewhere classified 01/31/2012    Fibromyalgia 01/31/1995    Hypothyroidism 01/30/1991        The  provided the following Interventions:   attempted to Initiate a relationship of care and support with patient in room 2302 this morning but found her busy at this time . [place] [facility] will attempt to follow up with her later. There is no advance directive on the chart. .  Provided information about Spiritual Care Services. Offered prayer and assurance of continued prayers on patients behalf. The following outcomes were achieved:  Patient shared limited information about   medical narrative and spiritual journey/beliefs. Patient processed feeling about current hospitalization. Patient expressed gratitude for pastoral care visit. Assessment:  Patient does not have any Church/cultural needs that will affect patients preferences in health care. There are no further spiritual or Church issues which require Spiritual Care Services interventions at this time. Plan:  Chaplains will continue to follow and will provide pastoral care on an as needed/requested basis    . Katty Ospina   Spiritual Care   [phone]

## 2023-02-04 NOTE — ROUTINE PROCESS
2010:Bedside and Verbal shift change report given to [address] [place] [address] (oncoming nurse) by Leno Yang RN (offgoing nurse). Report included the following information SBAR, Kardex, Intake/Output, MAR, Recent Results, and Cardiac Rhythm SR . Comfortably resting in bed. HOB elevated. No SOB on RA. Right wrist cath site CDI. Splint on. Sensations intact. Pulses present. Denies any pain or discomfort at this time. Call light within reach. Family at bedside . Wound Prevention Checklist    Patient: Shiva De La Garza (32 y.o. female)  Date: 2/4/2023  Diagnosis: Chest pain, unspecified type [R07.9]  CAD (coronary artery disease), native coronary artery [I25.10] <principal problem not specified>    Precautions:         []  Heel prevention boots placed on patient    []  Patient turned q2h during shift    []  Lift team ordered    [x]  Patient on [place] bed/Specialty bed    [x]  Each Wound is documented during shift (Stage, Color, drainage, odor, measurements, and dressings)    [x]  Dual skin check done with Dixie Melendez RN        2159: Due med given. Per patient is not taking Atorvastatin due to her Fibromyalgia. \" I'm taking Rosuvastain instead. The lowest dose. 2332: No change from previous assessment. Pre-op DVD #1, #2 & #3 played & watched w/ daughter. 0200: Comfortably sleeping.     0353: No change from previous assessment. 0600: Slept good thru night. Needs attended. 0725: Bedside and Verbal shift change report given to [address] [place] [address] (oncoming nurse) by Marco Antonio Nobles RN (offgoing nurse). Report included the following information SBAR, Kardex, Intake/Output, MAR, Recent Results, and Cardiac Rhythm SR/SR .

## 2023-02-04 NOTE — ACP (ADVANCE CARE PLANNING)
Advance Care Planning   Advance Care Planning Inpatient Note  [address] [facility] Department    Today's Date: 2/4/2023  Unit: [facility] 2 CV STEPDOWN    Received request from . Upon review of chart and communication with care team, patient's decision making abilities are not in question. Patient was/were present in the room during visit. Goals of ACP Conversation:  Discuss Advance Care planning documents    Health Care Decision Makers:    No healthcare decision makers have been documented. Click here to complete [address] including selection of the Healthcare Decision Maker Relationship (ie \"Primary\")  Summary:  No Decision Maker named by patient at this time    Advance Care Planning Documents (Patient Wishes) on file:  None     Assessment:     attempted to visit with patient in room 2302 today as a new admit but found her busy  at this time. Will attempted to follow up later. There is no advance directive  Ob chart.   Interventions:  Deferred conversation as patient not interested in completing an advance directive at this time    Care Preferences Communicated:  No    Outcomes/Plan:      Lauren Meraz [facility] on 2/4/2023 at 1:32 PM

## 2023-02-04 NOTE — PROGRESS NOTES
Cardiology Progress Note    Admit Date: 2/3/2023  Attending Cardiologist: Dr. Lacho Nava:     -CAD:  -[facility] on 02/4/23 with RCA as well as left main disease.  -Obesity  -Diabetes  -Hypothyroidism  -Hypertension  -Hyperlipidemia    Primary cardiologist Dr. Goodson [place]:     Patient with three-vessel disease including RCA and left main disease  On amiodarone for prophylactic protocol  Continue aspirin, metoprolol, statin  Awaiting CABG    Subjective:     No new complaints. Denies chest pain or chest tightness. Family at bedside.   Discussed with doc    Objective:      Patient Vitals for the past 8 hrs:   Temp Pulse Resp BP SpO2   02/04/23 0916 -- 64 -- (!) 147/85 --   02/04/23 0740 98.1 °F (36.7 °C) 62 18 (!) 154/73 96 %   02/04/23 0456 -- (!) 52 -- -- --   02/04/23 0353 97.8 °F (36.6 °C) 64 18 (!) 162/88 96 %         Patient Vitals for the past 96 hrs:   Weight   02/04/23 0353 75.3 kg (165 lb 14.4 oz)   02/03/23 0813 72.6 kg (160 lb)       TELE: normal sinus rhythm               Current Facility-Administered Medications   Medication Dose Route Frequency Last Admin    rosuvastatin (CRESTOR) tablet 20 mg  20 mg Oral QHS      0.9% sodium chloride infusion  100 mL/hr IntraVENous CONTINUOUS 100 mL/hr at 02/03/23 0840    sodium chloride (NS) flush 5-40 mL  5-40 mL IntraVENous Q8H 10 mL at 02/04/23 0600    sodium chloride (NS) flush 5-40 mL  5-40 mL IntraVENous PRN      mupirocin (BACTROBAN) 2 % ointment   Both Nostrils BID Given at 02/04/23 9666    amiodarone (CORDARONE) tablet 400 mg  400 mg Oral TID WITH MEALS 400 mg at 02/03/23 1700    Followed by    Conner Rivas ON 2/9/2023] amiodarone (CORDARONE) tablet 200 mg  200 mg Oral BID WITH MEALS      levothyroxine (SYNTHROID) tablet 100 mcg  100 mcg Oral DAILY 100 mcg at 02/04/23 0916    aspirin delayed-release tablet 81 mg  81 mg Oral DAILY 81 mg at 02/04/23 0916    metoprolol tartrate (LOPRESSOR) tablet 12.5 mg  12.5 mg Oral Q12H 12.5 mg at 02/04/23 4604 hydrALAZINE (APRESOLINE) 20 mg/mL injection 10 mg  10 mg IntraVENous Q6H PRN      insulin lispro (HUMALOG) injection   SubCUTAneous AC&HS 2 Units at 02/03/23 2204    glucose chewable tablet 16 g  4 Tablet Oral PRN      glucagon (GLUCAGEN) injection 1 mg  1 mg IntraMUSCular PRN      dextrose 10% infusion 0-250 mL  0-250 mL IntraVENous PRN      [START ON 2/6/2023] ceFAZolin (ANCEF) 2 g in sterile water (preservative free) 20 mL IV syringe  2 g IntraVENous ON CALL TO OR           Intake/Output Summary (Last 24 hours) at 2/4/2023 1000  Last data filed at 2/4/2023 0353  Gross per 24 hour   Intake 480 ml   Output 500 ml   Net -20 ml       Physical Exam:  General:  alert, cooperative, no distress, appears stated age  Neck:  no carotid bruit, no JVD  Lungs:  clear to auscultation bilaterally  Heart:  regular rate and rhythm, S1, S2 normal, no murmur, click, rub or gallop  Abdomen:  abdomen is soft without significant tenderness, masses, organomegaly or guarding  Extremities:  extremities normal, atraumatic, no cyanosis or edema    Visit Vitals  BP (!) 147/85   Pulse 64   Temp 98.1 °F (36.7 °C)   Resp 18   Ht 5' 6\" (1.676 m)   Wt 75.3 kg (165 lb 14.4 oz)   SpO2 96%   Breastfeeding No   BMI 26.78 kg/m²       Data Review:     Labs: Results:       Chemistry Recent Labs     02/04/23  0545 02/01/23  1310   * 163*    139   K 3.7 4.0    107   CO2 28 28   BUN 9 13   CREA 0.65 0.67   CA 8.7 8.7   AGAP 5 4   BUCR 14 19   AP 88 101   TP 5.6* 6.4   ALB 3.3* 3.9   GLOB 2.3 2.5   AGRAT 1.4 1.6      CBC w/Diff Recent Labs     02/04/23  0545 02/03/23  1739 02/01/23  1310   WBC 4.5* 5.5 5.7   RBC 3.92* 4.14* 4.12*   HGB 12.3 12.7 12.6   HCT 36.7 37.7 38.3    217 234   GRANS 53 56 55   LYMPH 31 31 32   EOS 6* 4 5      Cardiac Enzymes No results found for: CPK, CK, CKMMB, CKMB, RCK3, CKMBT, CKNDX, CKND1, LOYD, TROPT, TROIQ, BOY, TROPT, TNIPOC, BNP, BNPP   Coagulation Recent Labs     02/01/23  1310   PTP 13.5   INR 1.0 Lipid Panel Lab Results   Component Value Date/Time    Cholesterol, total 185 02/04/2023 05:45 AM    HDL Cholesterol 40 02/04/2023 05:45 AM    LDL, calculated 108.6 (H) 02/04/2023 05:45 AM    VLDL, calculated 36.4 02/04/2023 05:45 AM    Triglyceride 182 (H) 02/04/2023 05:45 AM    CHOL/HDL Ratio 4.6 02/04/2023 05:45 AM      BNP No results found for: BNP, BNPP, XBNPT   Liver Enzymes Recent Labs     02/04/23  0545   TP 5.6*   ALB 3.3*   AP 88      Thyroid Studies Lab Results   Component Value Date/Time    TSH 1.40 02/04/2023 05:45 AM          Signed By: Moises Bacon MD     February 4, 2023

## 2023-02-04 NOTE — PROGRESS NOTES
[facility]   [facility]    Name: Albaro Taylorllor  : 1947  Patient Phone: [phone] (home)     Procedure: BILATERAL L2,L3,L4 MEDIAL BRANCH L5 DORSAL RAMI INJECTION - Bilateral  Date of Procedure: 21  Surgeon: Surendra Lo MD    Ht:  5' 8.5\" (174 cm)  Wt: 195 lb (88.5 kg)  Wt method: Allergies: Allergies   Allergen Reactions    Cortisone Other (See Comments)     Irritates \"GERD\"     Adhesive Tape Rash    Wound Dressing Adhesive      Redness, irritation           Latex Allergy Screening Tool  Have you ever had a reaction to or been told by a physician that you have an allergy to latex or natural rubber?: No    There were no vitals filed for this visit. No LMP for male patient. Do you take blood thinners? [x] Yes    [] No         Instructed to stop blood thinners prior to procedure? [x] Yes    [] No      [] N/A   Do you have sleep apnea? [] Yes    [x] No     Do you have acid reflux ? [x] Yes    [] No     Have you had a respiratory infection or sore throat in last 4 weeks before surgery? [] Yes    [x] No     Do you have poorly controlled asthma or COPD? [] Yes    [x] No         Have you had an EKG in last 12 months? [] Yes    [x] No          Do you smoke? [x] Yes    [] No      Please refrain from smoking on the day of surgery. Patient instructed on: [x] NPO Status   [x] Meds to Take  [x] Ride Home  [x]No Jewelry/Contact Lenses/Nail Polish     DOS Patient Needs [] HCG   [] Blood Sugar  [] PT/INR         COVID Vaccinated? [x] Yes    [] No                     Patient instructed on the pre-operative, intra-operative, and post-operative process? Yes  Medication instructions reviewed with patient?   Yes Problem: Falls - Risk of  Goal: *Absence of Falls  Description: Document Tony Moy Fall Risk and appropriate interventions in the flowsheet. Outcome: Progressing Towards Goal  Note: Fall Risk Interventions:          Medication Interventions: Bed/chair exit alarm, Evaluate medications/consider consulting pharmacy, Patient to call before getting OOB, Teach patient to arise slowly                   Problem: Diabetes Self-Management  Goal: *Disease process and treatment process  Description: Define diabetes and identify own type of diabetes; list 3 options for treating diabetes. Outcome: Progressing Towards Goal  Goal: *Incorporating nutritional management into lifestyle  Description: Describe effect of type, amount and timing of food on blood glucose; list 3 methods for planning meals. Outcome: Progressing Towards Goal  Goal: *Incorporating physical activity into lifestyle  Description: State effect of exercise on blood glucose levels. Outcome: Progressing Towards Goal  Goal: *Developing strategies to promote health/change behavior  Description: Define the ABC's of diabetes; identify appropriate screenings, schedule and personal plan for screenings. Outcome: Progressing Towards Goal  Goal: *Using medications safely  Description: State effect of diabetes medications on diabetes; name diabetes medication taking, action and side effects. Outcome: Progressing Towards Goal  Goal: *Monitoring blood glucose, interpreting and using results  Description: Identify recommended blood glucose targets  and personal targets.   Outcome: Progressing Towards Goal  Goal: *Developing strategies to address psychosocial issues  Description: Describe feelings about living with diabetes; identify support needed and support network  Outcome: Progressing Towards Goal

## 2023-02-05 LAB
ANION GAP SERPL CALC-SCNC: 6 MMOL/L (ref 3–18)
BASOPHILS # BLD: 0 K/UL (ref 0–0.1)
BASOPHILS NFR BLD: 1 % (ref 0–2)
BUN SERPL-MCNC: 14 MG/DL (ref 7–18)
BUN/CREAT SERPL: 19 (ref 12–20)
CALCIUM SERPL-MCNC: 8.8 MG/DL (ref 8.5–10.1)
CHLORIDE SERPL-SCNC: 107 MMOL/L (ref 100–111)
CO2 SERPL-SCNC: 27 MMOL/L (ref 21–32)
CREAT SERPL-MCNC: 0.73 MG/DL (ref 0.6–1.3)
DIFFERENTIAL METHOD BLD: NORMAL
EOSINOPHIL # BLD: 0.2 K/UL (ref 0–0.4)
EOSINOPHIL NFR BLD: 4 % (ref 0–5)
ERYTHROCYTE [DISTWIDTH] IN BLOOD BY AUTOMATED COUNT: 12.5 % (ref 11.6–14.5)
GLUCOSE BLD STRIP.AUTO-MCNC: 141 MG/DL (ref 70–110)
GLUCOSE BLD STRIP.AUTO-MCNC: 146 MG/DL (ref 70–110)
GLUCOSE BLD STRIP.AUTO-MCNC: 155 MG/DL (ref 70–110)
GLUCOSE BLD STRIP.AUTO-MCNC: 194 MG/DL (ref 70–110)
GLUCOSE SERPL-MCNC: 159 MG/DL (ref 74–99)
HCT VFR BLD AUTO: 38.5 % (ref 35–45)
HGB BLD-MCNC: 13 G/DL (ref 12–16)
IMM GRANULOCYTES # BLD AUTO: 0 K/UL (ref 0–0.04)
IMM GRANULOCYTES NFR BLD AUTO: 0 % (ref 0–0.5)
LYMPHOCYTES # BLD: 1.6 K/UL (ref 0.9–3.6)
LYMPHOCYTES NFR BLD: 27 % (ref 21–52)
MCH RBC QN AUTO: 31 PG (ref 24–34)
MCHC RBC AUTO-ENTMCNC: 33.8 G/DL (ref 31–37)
MCV RBC AUTO: 91.7 FL (ref 78–100)
MONOCYTES # BLD: 0.5 K/UL (ref 0.05–1.2)
MONOCYTES NFR BLD: 8 % (ref 3–10)
NEUTS SEG # BLD: 3.5 K/UL (ref 1.8–8)
NEUTS SEG NFR BLD: 60 % (ref 40–73)
NRBC # BLD: 0 K/UL (ref 0–0.01)
NRBC BLD-RTO: 0 PER 100 WBC
PLATELET # BLD AUTO: 233 K/UL (ref 135–420)
PMV BLD AUTO: 10.5 FL (ref 9.2–11.8)
POTASSIUM SERPL-SCNC: 3.7 MMOL/L (ref 3.5–5.5)
RBC # BLD AUTO: 4.2 M/UL (ref 4.2–5.3)
SARS-COV-2 RDRP RESP QL NAA+PROBE: NOT DETECTED
SODIUM SERPL-SCNC: 140 MMOL/L (ref 136–145)
SOURCE, COVRS: NORMAL
WBC # BLD AUTO: 5.9 K/UL (ref 4.6–13.2)

## 2023-02-05 PROCEDURE — APPNB30 APP NON BILLABLE TIME 0-30 MINS: Performed by: PHYSICIAN ASSISTANT

## 2023-02-05 PROCEDURE — 74011636637 HC RX REV CODE- 636/637: Performed by: PHYSICIAN ASSISTANT

## 2023-02-05 PROCEDURE — 9990 CHARGE CONVERSION

## 2023-02-05 PROCEDURE — 99232 SBSQ HOSP IP/OBS MODERATE 35: CPT | Performed by: PHYSICIAN ASSISTANT

## 2023-02-05 PROCEDURE — 85025 COMPLETE CBC W/AUTO DIFF WBC: CPT

## 2023-02-05 PROCEDURE — 36415 COLL VENOUS BLD VENIPUNCTURE: CPT

## 2023-02-05 PROCEDURE — 87635 SARS-COV-2 COVID-19 AMP PRB: CPT

## 2023-02-05 PROCEDURE — 99232 SBSQ HOSP IP/OBS MODERATE 35: CPT | Performed by: INTERNAL MEDICINE

## 2023-02-05 PROCEDURE — 74011250637 HC RX REV CODE- 250/637: Performed by: PHYSICIAN ASSISTANT

## 2023-02-05 PROCEDURE — 65620000000 HC RM CCU GENERAL

## 2023-02-05 PROCEDURE — 80048 BASIC METABOLIC PNL TOTAL CA: CPT

## 2023-02-05 PROCEDURE — 2709999900 HC NON-CHARGEABLE SUPPLY

## 2023-02-05 PROCEDURE — 82962 GLUCOSE BLOOD TEST: CPT

## 2023-02-05 PROCEDURE — 65270000029 HC RM PRIVATE

## 2023-02-05 PROCEDURE — 74011250636 HC RX REV CODE- 250/636: Performed by: PHYSICIAN ASSISTANT

## 2023-02-05 PROCEDURE — 74011000250 HC RX REV CODE- 250: Performed by: PHYSICIAN ASSISTANT

## 2023-02-05 RX ORDER — PHENYLEPHRINE HCL IN 0.9% NACL 50MG/250ML
10-100 PLASTIC BAG, INJECTION (ML) INTRAVENOUS
Status: DISCONTINUED | OUTPATIENT
Start: 2023-02-06 | End: 2023-02-06 | Stop reason: SDUPTHER

## 2023-02-05 RX ORDER — SODIUM CHLORIDE 9 MG/ML
100 INJECTION, SOLUTION INTRAVENOUS CONTINUOUS
Status: DISCONTINUED | OUTPATIENT
Start: 2023-02-06 | End: 2023-02-06

## 2023-02-05 RX ADMIN — METOPROLOL TARTRATE 12.5 MG: 25 TABLET, FILM COATED ORAL at 21:15

## 2023-02-05 RX ADMIN — Medication 2 UNITS: at 12:40

## 2023-02-05 RX ADMIN — MUPIROCIN: 20 OINTMENT TOPICAL at 17:55

## 2023-02-05 RX ADMIN — SODIUM CHLORIDE, PRESERVATIVE FREE 10 ML: 5 INJECTION INTRAVENOUS at 16:46

## 2023-02-05 RX ADMIN — ASPIRIN 81 MG: 81 TABLET, COATED ORAL at 08:21

## 2023-02-05 RX ADMIN — ROSUVASTATIN CALCIUM 20 MG: 20 TABLET, COATED ORAL at 21:16

## 2023-02-05 RX ADMIN — SODIUM CHLORIDE, PRESERVATIVE FREE 10 ML: 5 INJECTION INTRAVENOUS at 21:14

## 2023-02-05 RX ADMIN — LEVOTHYROXINE SODIUM 100 MCG: 100 TABLET ORAL at 08:21

## 2023-02-05 RX ADMIN — HYDRALAZINE HYDROCHLORIDE 10 MG: 20 INJECTION INTRAMUSCULAR; INTRAVENOUS at 23:31

## 2023-02-05 RX ADMIN — Medication 2 UNITS: at 21:15

## 2023-02-05 RX ADMIN — SODIUM CHLORIDE, PRESERVATIVE FREE 10 ML: 5 INJECTION INTRAVENOUS at 05:30

## 2023-02-05 RX ADMIN — MUPIROCIN: 20 OINTMENT TOPICAL at 08:28

## 2023-02-05 NOTE — PROGRESS NOTES
[facility]          Chief Complaint:  Follow up for CAD    Interval History:  currently pt denies CP or SOB. Preop studies completed and reviewed. Reports nausea and reflux with anesthesia colonoscopy in past.    Exam:   Visit Vitals  BP (!) 113/57   Pulse (!) 55   Temp 97.7 °F (36.5 °C)   Resp 18   Ht 5' 6\" (1.676 m)   Wt 73 kg (160 lb 15 oz)   SpO2 94%   Breastfeeding No   BMI 25.98 kg/m²        Const:  alert and oriented. NAD   CV:   RRR without murmur or rub. PMI not displaced. no peripheral edema   Respiratory:  Clear to auscultation without wheezes, rales or rhonchi. No accessory muscle use.       Assessment:    3VCAD  HTN  HLD  DM2  Fibromyalgia - no home meds    PLAN:    D/c amio 2/2 celina and being held appropriately  COVID screen, not vaccinated  CABG planned for 2/6, 0730 am  NPO p MARITZA Duarte PA-C  [facility]  [phone]

## 2023-02-05 NOTE — PROGRESS NOTES
Problem: Falls - Risk of  Goal: *Absence of Falls  Description: Document Pasha Rg Fall Risk and appropriate interventions in the flowsheet. Outcome: Progressing Towards Goal  Note: Fall Risk Interventions:            Medication Interventions: Patient to call before getting OOB, Teach patient to arise slowly                   Problem: Patient Education: Go to Patient Education Activity  Goal: Patient/Family Education  Outcome: Progressing Towards Goal     Problem: Diabetes Self-Management  Goal: *Disease process and treatment process  Description: Define diabetes and identify own type of diabetes; list 3 options for treating diabetes. Outcome: Progressing Towards Goal  Goal: *Incorporating nutritional management into lifestyle  Description: Describe effect of type, amount and timing of food on blood glucose; list 3 methods for planning meals. Outcome: Progressing Towards Goal  Goal: *Incorporating physical activity into lifestyle  Description: State effect of exercise on blood glucose levels. Outcome: Progressing Towards Goal  Goal: *Developing strategies to promote health/change behavior  Description: Define the ABC's of diabetes; identify appropriate screenings, schedule and personal plan for screenings. Outcome: Progressing Towards Goal  Goal: *Using medications safely  Description: State effect of diabetes medications on diabetes; name diabetes medication taking, action and side effects. Outcome: Progressing Towards Goal  Goal: *Monitoring blood glucose, interpreting and using results  Description: Identify recommended blood glucose targets  and personal targets. Outcome: Progressing Towards Goal  Goal: *Prevention, detection, treatment of acute complications  Description: List symptoms of hyper- and hypoglycemia; describe how to treat low blood sugar and actions for lowering  high blood glucose level.   Outcome: Progressing Towards Goal  Goal: *Prevention, detection and treatment of chronic complications  Description: Define the natural course of diabetes and describe the relationship of blood glucose levels to long term complications of diabetes.   Outcome: Progressing Towards Goal  Goal: *Developing strategies to address psychosocial issues  Description: Describe feelings about living with diabetes; identify support needed and support network  Outcome: Progressing Towards Goal  Goal: *Insulin pump training  Outcome: Resolved/Not Met  Goal: *Sick day guidelines  Outcome: Progressing Towards Goal  Goal: *Patient Specific Goal (EDIT GOAL, INSERT TEXT)  Outcome: Resolved/Met     Problem: Patient Education: Go to Patient Education Activity  Goal: Patient/Family Education  Outcome: Progressing Towards Goal     Problem: CABG: Pre-Op Day  Goal: Activity/Safety  Outcome: Progressing Towards Goal  Goal: Consults, if ordered  Outcome: Progressing Towards Goal  Goal: Diagnostic Test/Procedures  Outcome: Progressing Towards Goal  Goal: Nutrition/Diet  Outcome: Progressing Towards Goal  Goal: Medications  Outcome: Progressing Towards Goal  Goal: Discharge Planning  Outcome: Progressing Towards Goal  Goal: Psychosocial  Outcome: Progressing Towards Goal  Goal: *Hemodynamically stable  Outcome: Progressing Towards Goal  Goal: *Consent obtained, permits and diagnostics complete  Outcome: Progressing Towards Goal

## 2023-02-05 NOTE — ROUTINE PROCESS
1920:Bedside and Verbal shift change report given to [facility] RN  (oncoming nurse) by Morenita Tanner RN (offgoing nurse). Report included the following information SBAR, Kardex, Intake/Output, MAR, Recent Results, and Cardiac Rhythm SB/SR w/ PVC's . Resting in bed. Family at bedside. Denies any pain or discomfort at this time. Call light within  reach. 2118: Due meds given. . Coverage provided per protocol. 2130: Instructions given on how to use incentive spirometer. Can reached 2000. Encouraged to practice 5-10 x every hour while awake or watching TV. Sequential stockings provided & applied to BLE.     2316: No change from previous assessment. 0200: Comfortably sleeping.     0456: No change from previous assessment. 0530: Slept good thru night. Needs attended. Refused AM care. 1825: Bedside and Verbal shift change report given to Morenita Tanner RN (oncoming nurse) by [facility] RN (offgoing nurse). Report included the following information SBAR, Kardex, Intake/Output, MAR, Recent Results, and Cardiac Rhythm SB/SR w/ PVC's. Aaron Haley

## 2023-02-05 NOTE — PROGRESS NOTES
Cardiology Progress Note    Admit Date: 2/3/2023  Attending Cardiologist: Dr. Mildred Dalal:     -CAD:  -[facility] on 02/4/23 with RCA as well as left main disease.  -Obesity  -Diabetes  -Hypothyroidism  -Hypertension  -Hyperlipidemia    Primary cardiologist Dr. Jackson Nest: On amiodarone for prophylactic protocol with holding parameters  Continue aspirin, metoprolol, statin  Awaiting CABG tomorrow    Subjective:     No new complaints. Denies chest pain or chest tightness reminiscent of angina    Family at bedside.   Discussed with daughter today as well    Objective:      Patient Vitals for the past 8 hrs:   Temp Pulse Resp BP SpO2   02/05/23 0821 -- (!) 55 -- (!) 113/57 --   02/05/23 0740 97.7 °F (36.5 °C) (!) 55 18 (!) 113/56 94 %   02/05/23 0456 98.1 °F (36.7 °C) (!) 55 16 128/75 96 %           Patient Vitals for the past 96 hrs:   Weight   02/05/23 0530 73 kg (160 lb 15 oz)   02/04/23 0353 75.3 kg (165 lb 14.4 oz)   02/03/23 0813 72.6 kg (160 lb)         TELE: normal sinus rhythm               Current Facility-Administered Medications   Medication Dose Route Frequency Last Admin    [START ON 2/6/2023] 0.9% sodium chloride infusion  100 mL/hr IntraVENous CONTINUOUS      rosuvastatin (CRESTOR) tablet 20 mg  20 mg Oral QHS 20 mg at 02/04/23 2119    sodium chloride (NS) flush 5-40 mL  5-40 mL IntraVENous Q8H 10 mL at 02/05/23 0530    sodium chloride (NS) flush 5-40 mL  5-40 mL IntraVENous PRN      mupirocin (BACTROBAN) 2 % ointment   Both Nostrils BID Given at 02/05/23 6875    levothyroxine (SYNTHROID) tablet 100 mcg  100 mcg Oral DAILY 100 mcg at 02/05/23 4121    aspirin delayed-release tablet 81 mg  81 mg Oral DAILY 81 mg at 02/05/23 0821    metoprolol tartrate (LOPRESSOR) tablet 12.5 mg  12.5 mg Oral Q12H 12.5 mg at 02/04/23 2118    hydrALAZINE (APRESOLINE) 20 mg/mL injection 10 mg  10 mg IntraVENous Q6H PRN      insulin lispro (HUMALOG) injection   SubCUTAneous AC&HS 4 Units at 02/04/23 2118    glucose chewable tablet 16 g  4 Tablet Oral PRN      glucagon (GLUCAGEN) injection 1 mg  1 mg IntraMUSCular PRN      dextrose 10% infusion 0-250 mL  0-250 mL IntraVENous PRN      [START ON 2/6/2023] ceFAZolin (ANCEF) 2 g in sterile water (preservative free) 20 mL IV syringe  2 g IntraVENous ON CALL TO OR           Intake/Output Summary (Last 24 hours) at 2/5/2023 0936  Last data filed at 2/5/2023 0530  Gross per 24 hour   Intake 1080 ml   Output 1726 ml   Net -646 ml         Physical Exam:  General:  alert, cooperative, no distress, appears stated age  Neck:  no carotid bruit, no JVD  Lungs:  clear to auscultation bilaterally  Heart:  regular rate and rhythm, S1, S2 normal, no murmur,   Abdomen:  abdomen is soft without significant tenderness,  Extremities:   no cyanosis or edema    Visit Vitals  BP (!) 113/57   Pulse (!) 55   Temp 97.7 °F (36.5 °C)   Resp 18   Ht 5' 6\" (1.676 m)   Wt 73 kg (160 lb 15 oz)   SpO2 94%   Breastfeeding No   BMI 25.98 kg/m²       Data Review:     Labs: Results:       Chemistry Recent Labs     02/05/23  0539 02/04/23  0545   * 145*    141   K 3.7 3.7    108   CO2 27 28   BUN 14 9   CREA 0.73 0.65   CA 8.8 8.7   AGAP 6 5   BUCR 19 14   AP  --  88   TP  --  5.6*   ALB  --  3.3*   GLOB  --  2.3   AGRAT  --  1.4        CBC w/Diff Recent Labs     02/05/23  0539 02/04/23  0545 02/03/23  1739   WBC 5.9 4.5* 5.5   RBC 4.20 3.92* 4.14*   HGB 13.0 12.3 12.7   HCT 38.5 36.7 37.7    198 217   GRANS 60 53 56   LYMPH 27 31 31   EOS 4 6* 4        Cardiac Enzymes No results found for: CPK, CK, CKMMB, CKMB, RCK3, CKMBT, CKNDX, CKND1, LOYD, TROPT, TROIQ, BOY, TROPT, TNIPOC, BNP, BNPP   Coagulation No results for input(s): PTP, INR, APTT, INREXT, INREXT in the last 72 hours.       Lipid Panel Lab Results   Component Value Date/Time    Cholesterol, total 185 02/04/2023 05:45 AM    HDL Cholesterol 40 02/04/2023 05:45 AM    LDL, calculated 108.6 (H) 02/04/2023 05:45 AM    VLDL, calculated 36.4 02/04/2023 05:45 AM    Triglyceride 182 (H) 02/04/2023 05:45 AM    CHOL/HDL Ratio 4.6 02/04/2023 05:45 AM      BNP No results found for: BNP, BNPP, XBNPT   Liver Enzymes Recent Labs     02/04/23  0545   TP 5.6*   ALB 3.3*   AP 88        Thyroid Studies Lab Results   Component Value Date/Time    TSH 1.40 02/04/2023 05:45 AM          Signed By: Teo Harvey MD     February 5, 2023

## 2023-02-05 NOTE — PROGRESS NOTES
1100: TRANSFER - IN REPORT:    Verbal report received from Krista Barnes RN(name) on Sidney Shah  being received from T SD (unit) for routine progression of care  patient having surgery in AM    Report consisted of patients Situation, Background, Assessment and   Recommendations(SBAR). Information from the following report(s) SBAR, Kardex, Procedure Summary, Intake/Output, MAR, Recent Results, and Cardiac Rhythm SB-SR  was reviewed with the receiving nurse. Opportunity for questions and clarification was provided. Assessment completed upon patients arrival to unit and care assumed. 1330: Received patient from T SD via wheelchair with daughter and all belongings. Oriented patient too room and unit. Pre/post-op CABG teaching done with patient and daughter. Patient reports nausea with anesthesia and most pain medications. Patient reports \"tylenol with codeine seems to work best as far as not making me nauseous or too loopy\". Fitted patient for Heart Hugger and taught patient and daughter how to use.    1500: Patient with family at bedside, up ad tamanna to the bathroom. NAD noted. 1634: BP checked in both arms per pre-op orders. BP L arm = 153/81 (102)  BP R arm = 154/73 (98)    1800: Patient with family at bedside, up ad tamanna to the bathroom. NAD noted. 1900: Bedside and Verbal shift change report given to Cheyanne Zavala RN (oncoming nurse) by Sun Del Rio RN (offgoing nurse).  Report included the following information SBAR, Kardex, Intake/Output, MAR, Recent Results, and Cardiac Rhythm SR .

## 2023-02-05 NOTE — PROGRESS NOTES
SBAR report taken from off going nurse [address] [facility] by oncoming nurse Jason Obrien RN. Two man skin assessment completed with [facility] Standard. 5543- Dr. Delicia Lobo at the bedside to discuss care. Kenton Pereyra MD and Reg VALENTINO at the bedside to discuss care. The patient had several family members visit during the day and the patient remained stable and comfortable. Patient stated several times that she has fibromyalgia and her comfortable pain level is a 9/10. She did endorse mild pain 2-3/10 throughout the day however she refused pain meds several times. She finds ambulation and activity is effective for pain control.      SBAR report given to on coming nurse Gerald OROZCO at [facility]

## 2023-02-06 ENCOUNTER — ANESTHESIA (OUTPATIENT)
Dept: CARDIOTHORACIC SURGERY | Age: 68
DRG: 948 | End: 2023-02-06
Payer: MEDICARE

## 2023-02-06 ENCOUNTER — APPOINTMENT (OUTPATIENT)
Dept: GENERAL RADIOLOGY | Age: 68
DRG: 948 | End: 2023-02-06
Attending: PHYSICIAN ASSISTANT
Payer: MEDICARE

## 2023-02-06 LAB
ACT BLD: 125 SECS (ref 79–138)
ACT BLD: 137 SECS (ref 79–138)
ACT BLD: 462 SECS (ref 79–138)
ACT BLD: 546 SECS (ref 79–138)
ACT BLD: 570 SECS (ref 79–138)
ACT BLD: 618 SECS (ref 79–138)
ACT BLD: 630 SECS (ref 79–138)
ADMINISTERED INITIALS, ADMINIT: NORMAL
ANION GAP BLD CALC-SCNC: ABNORMAL (ref 10–20)
ANION GAP SERPL CALC-SCNC: 3 MMOL/L (ref 3–18)
ANION GAP SERPL CALC-SCNC: 8 MMOL/L (ref 3–18)
APTT PPP: 31 SEC (ref 23–36.4)
ARTERIAL PATENCY WRIST A: ABNORMAL
ATRIAL RATE: 73 BPM
BASE DEFICIT BLD-SCNC: 0.2 MMOL/L
BASE DEFICIT BLD-SCNC: 0.4 MMOL/L
BASE DEFICIT BLD-SCNC: 0.7 MMOL/L
BASE DEFICIT BLD-SCNC: 0.8 MMOL/L
BASE DEFICIT BLD-SCNC: 1 MMOL/L
BASE DEFICIT BLD-SCNC: 1.1 MMOL/L
BASE DEFICIT BLD-SCNC: 2.8 MMOL/L
BASE DEFICIT BLD-SCNC: 3.6 MMOL/L
BASE DEFICIT BLD-SCNC: 3.7 MMOL/L
BASE DEFICIT BLD-SCNC: 5.1 MMOL/L
BASE EXCESS BLD CALC-SCNC: 0 MMOL/L
BASE EXCESS BLD CALC-SCNC: 0.1 MMOL/L
BASE EXCESS BLD CALC-SCNC: 0.1 MMOL/L
BASOPHILS # BLD: 0 K/UL (ref 0–0.1)
BASOPHILS # BLD: 0 K/UL (ref 0–0.1)
BASOPHILS NFR BLD: 0 % (ref 0–2)
BASOPHILS NFR BLD: 0 % (ref 0–2)
BDY SITE: ABNORMAL
BODY TEMPERATURE: 98
BODY TEMPERATURE: 98.1
BODY TEMPERATURE: 98.2
BUN SERPL-MCNC: 11 MG/DL (ref 7–18)
BUN SERPL-MCNC: 15 MG/DL (ref 7–18)
BUN/CREAT SERPL: 16 (ref 12–20)
BUN/CREAT SERPL: 21 (ref 12–20)
CA-I BLD-MCNC: 0.94 MMOL/L (ref 1.12–1.32)
CA-I BLD-MCNC: 0.96 MMOL/L (ref 1.12–1.32)
CA-I BLD-MCNC: 0.96 MMOL/L (ref 1.12–1.32)
CA-I BLD-MCNC: 0.98 MMOL/L (ref 1.12–1.32)
CA-I BLD-MCNC: 1.12 MMOL/L (ref 1.12–1.32)
CA-I BLD-MCNC: 1.15 MMOL/L (ref 1.12–1.32)
CA-I SERPL-SCNC: 0.93 MMOL/L (ref 1.15–1.33)
CALCIUM SERPL-MCNC: 6.7 MG/DL (ref 8.5–10.1)
CALCIUM SERPL-MCNC: 8.9 MG/DL (ref 8.5–10.1)
CALCULATED P AXIS, ECG09: 43 DEGREES
CALCULATED R AXIS, ECG10: 2 DEGREES
CALCULATED T AXIS, ECG11: 62 DEGREES
CHLORIDE BLD-SCNC: 102 MMOL/L (ref 98–107)
CHLORIDE BLD-SCNC: 104 MMOL/L (ref 98–107)
CHLORIDE BLD-SCNC: 104 MMOL/L (ref 98–107)
CHLORIDE BLD-SCNC: 106 MMOL/L (ref 98–107)
CHLORIDE BLD-SCNC: 106 MMOL/L (ref 98–107)
CHLORIDE BLD-SCNC: 108 MMOL/L (ref 98–107)
CHLORIDE SERPL-SCNC: 108 MMOL/L (ref 100–111)
CHLORIDE SERPL-SCNC: 112 MMOL/L (ref 100–111)
CO2 BLD-SCNC: 21 MMOL/L (ref 19–24)
CO2 BLD-SCNC: 21 MMOL/L (ref 19–24)
CO2 BLD-SCNC: 23 MMOL/L (ref 19–24)
CO2 BLD-SCNC: 24 MMOL/L (ref 19–24)
CO2 BLD-SCNC: 25 MMOL/L (ref 19–24)
CO2 SERPL-SCNC: 22 MMOL/L (ref 21–32)
CO2 SERPL-SCNC: 29 MMOL/L (ref 21–32)
CREAT BLD-MCNC: 0.79 MG/DL (ref 0.6–1.3)
CREAT BLD-MCNC: 0.81 MG/DL (ref 0.6–1.3)
CREAT BLD-MCNC: 0.82 MG/DL (ref 0.6–1.3)
CREAT BLD-MCNC: 0.82 MG/DL (ref 0.6–1.3)
CREAT BLD-MCNC: 0.83 MG/DL (ref 0.6–1.3)
CREAT BLD-MCNC: 0.85 MG/DL (ref 0.6–1.3)
CREAT SERPL-MCNC: 0.7 MG/DL (ref 0.6–1.3)
CREAT SERPL-MCNC: 0.72 MG/DL (ref 0.6–1.3)
D50 ADMINISTERED, D50ADM: 0 ML
D50 ORDER, D50ORD: 0 ML
DIAGNOSIS, 93000: NORMAL
DIFFERENTIAL METHOD BLD: ABNORMAL
DIFFERENTIAL METHOD BLD: ABNORMAL
EOSINOPHIL # BLD: 0 K/UL (ref 0–0.4)
EOSINOPHIL # BLD: 0.2 K/UL (ref 0–0.4)
EOSINOPHIL NFR BLD: 0 % (ref 0–5)
EOSINOPHIL NFR BLD: 4 % (ref 0–5)
ERYTHROCYTE [DISTWIDTH] IN BLOOD BY AUTOMATED COUNT: 12.4 % (ref 11.6–14.5)
ERYTHROCYTE [DISTWIDTH] IN BLOOD BY AUTOMATED COUNT: 12.8 % (ref 11.6–14.5)
FIO2 ON VENT: 60 %
GAS FLOW.O2 O2 DELIVERY SYS: ABNORMAL
GAS FLOW.O2 SETTING OXYMISER: 16 BPM
GAS FLOW.O2 SETTING OXYMISER: 20 BPM
GLUCOSE BLD STRIP.AUTO-MCNC: 104 MG/DL (ref 70–110)
GLUCOSE BLD STRIP.AUTO-MCNC: 117 MG/DL (ref 70–110)
GLUCOSE BLD STRIP.AUTO-MCNC: 129 MG/DL (ref 70–110)
GLUCOSE BLD STRIP.AUTO-MCNC: 132 MG/DL (ref 70–110)
GLUCOSE BLD STRIP.AUTO-MCNC: 140 MG/DL (ref 70–110)
GLUCOSE BLD STRIP.AUTO-MCNC: 145 MG/DL (ref 70–110)
GLUCOSE BLD STRIP.AUTO-MCNC: 166 MG/DL (ref 70–110)
GLUCOSE BLD STRIP.AUTO-MCNC: 179 MG/DL (ref 70–110)
GLUCOSE BLD STRIP.AUTO-MCNC: 187 MG/DL (ref 70–110)
GLUCOSE BLD-MCNC: 156 MG/DL (ref 65–100)
GLUCOSE BLD-MCNC: 171 MG/DL (ref 65–100)
GLUCOSE BLD-MCNC: 172 MG/DL (ref 65–100)
GLUCOSE BLD-MCNC: 178 MG/DL (ref 65–100)
GLUCOSE BLD-MCNC: 184 MG/DL (ref 65–100)
GLUCOSE BLD-MCNC: 191 MG/DL (ref 65–100)
GLUCOSE BLD-MCNC: 196 MG/DL (ref 65–100)
GLUCOSE BLD-MCNC: 209 MG/DL (ref 65–100)
GLUCOSE SERPL-MCNC: 156 MG/DL (ref 74–99)
GLUCOSE SERPL-MCNC: 212 MG/DL (ref 74–99)
GLUCOSE, GLC: 104 MG/DL
GLUCOSE, GLC: 117 MG/DL
GLUCOSE, GLC: 129 MG/DL
GLUCOSE, GLC: 132 MG/DL
GLUCOSE, GLC: 145 MG/DL
GLUCOSE, GLC: 166 MG/DL
GLUCOSE, GLC: 179 MG/DL
GLUCOSE, GLC: 187 MG/DL
GLUCOSE, GLC: 209 MG/DL
HCO3 BLD-SCNC: 21.5 MMOL/L (ref 22–26)
HCO3 BLD-SCNC: 21.6 MMOL/L (ref 22–26)
HCO3 BLD-SCNC: 23.5 MMOL/L (ref 22–26)
HCO3 BLD-SCNC: 23.8 MMOL/L (ref 22–26)
HCO3 BLD-SCNC: 24 MMOL/L (ref 22–26)
HCO3 BLD-SCNC: 24 MMOL/L (ref 22–26)
HCO3 BLD-SCNC: 24.7 MMOL/L (ref 22–26)
HCO3 BLD-SCNC: 24.9 MMOL/L (ref 22–26)
HCO3 BLD-SCNC: 25.2 MMOL/L (ref 22–26)
HCO3 BLD-SCNC: 25.3 MMOL/L (ref 22–26)
HCO3 BLD-SCNC: 25.4 MMOL/L (ref 22–26)
HCO3 BLD-SCNC: 25.4 MMOL/L (ref 22–26)
HCO3 BLD-SCNC: 25.7 MMOL/L (ref 22–26)
HCT VFR BLD AUTO: 29.4 % (ref 35–45)
HCT VFR BLD AUTO: 39.3 % (ref 35–45)
HGB BLD-MCNC: 10.1 G/DL (ref 12–16)
HGB BLD-MCNC: 13.3 G/DL (ref 12–16)
HIGH TARGET, HITG: 150 MG/DL
IMM GRANULOCYTES # BLD AUTO: 0 K/UL (ref 0–0.04)
IMM GRANULOCYTES # BLD AUTO: 0 K/UL (ref 0–0.04)
IMM GRANULOCYTES NFR BLD AUTO: 0 % (ref 0–0.5)
IMM GRANULOCYTES NFR BLD AUTO: 0 % (ref 0–0.5)
INR PPP: 1.3 (ref 0.8–1.2)
INSPIRATION.DURATION SETTING TIME VENT: 0.9 SEC
INSULIN ADMINSTERED, INSADM: 2.2 UNITS/HOUR
INSULIN ADMINSTERED, INSADM: 2.9 UNITS/HOUR
INSULIN ADMINSTERED, INSADM: 3 UNITS/HOUR
INSULIN ADMINSTERED, INSADM: 3.5 UNITS/HOUR
INSULIN ADMINSTERED, INSADM: 3.6 UNITS/HOUR
INSULIN ADMINSTERED, INSADM: 3.8 UNITS/HOUR
INSULIN ADMINSTERED, INSADM: 4.3 UNITS/HOUR
INSULIN ADMINSTERED, INSADM: 4.8 UNITS/HOUR
INSULIN ADMINSTERED, INSADM: 5.3 UNITS/HOUR
INSULIN ORDER, INSORD: 2.2 UNITS/HOUR
INSULIN ORDER, INSORD: 2.9 UNITS/HOUR
INSULIN ORDER, INSORD: 3 UNITS/HOUR
INSULIN ORDER, INSORD: 3.5 UNITS/HOUR
INSULIN ORDER, INSORD: 3.6 UNITS/HOUR
INSULIN ORDER, INSORD: 3.8 UNITS/HOUR
INSULIN ORDER, INSORD: 4.3 UNITS/HOUR
INSULIN ORDER, INSORD: 4.8 UNITS/HOUR
INSULIN ORDER, INSORD: 5.3 UNITS/HOUR
LACTATE BLD-SCNC: 1.06 MMOL/L (ref 0.4–2)
LACTATE BLD-SCNC: 1.1 MMOL/L (ref 0.4–2)
LACTATE BLD-SCNC: 1.22 MMOL/L (ref 0.4–2)
LACTATE BLD-SCNC: 1.37 MMOL/L (ref 0.4–2)
LACTATE BLD-SCNC: 1.68 MMOL/L (ref 0.4–2)
LACTATE BLD-SCNC: 1.8 MMOL/L (ref 0.4–2)
LACTATE BLD-SCNC: 1.96 MMOL/L (ref 0.4–2)
LACTATE BLD-SCNC: 2.17 MMOL/L (ref 0.4–2)
LACTATE BLD-SCNC: 2.18 MMOL/L (ref 0.4–2)
LACTATE BLD-SCNC: 2.25 MMOL/L (ref 0.4–2)
LACTATE BLD-SCNC: 2.5 MMOL/L (ref 0.4–2)
LACTATE BLD-SCNC: 2.64 MMOL/L (ref 0.4–2)
LACTATE BLD-SCNC: 2.86 MMOL/L (ref 0.4–2)
LACTATE BLD-SCNC: 3.11 MMOL/L (ref 0.4–2)
LACTATE BLD-SCNC: <0.4 MMOL/L (ref 0.4–2)
LACTATE SERPL-SCNC: 2.2 MMOL/L (ref 0.4–2)
LOW TARGET, LOT: 80 MG/DL
LYMPHOCYTES # BLD: 0.9 K/UL (ref 0.9–3.6)
LYMPHOCYTES # BLD: 1.5 K/UL (ref 0.9–3.6)
LYMPHOCYTES NFR BLD: 27 % (ref 21–52)
LYMPHOCYTES NFR BLD: 8 % (ref 21–52)
MAGNESIUM SERPL-MCNC: 2.1 MG/DL (ref 1.6–2.6)
MCH RBC QN AUTO: 31.2 PG (ref 24–34)
MCH RBC QN AUTO: 31.4 PG (ref 24–34)
MCHC RBC AUTO-ENTMCNC: 33.8 G/DL (ref 31–37)
MCHC RBC AUTO-ENTMCNC: 34.4 G/DL (ref 31–37)
MCV RBC AUTO: 91.3 FL (ref 78–100)
MCV RBC AUTO: 92.3 FL (ref 78–100)
MINUTES UNTIL NEXT BG, NBG: 60 MIN
MONOCYTES # BLD: 0.6 K/UL (ref 0.05–1.2)
MONOCYTES # BLD: 0.7 K/UL (ref 0.05–1.2)
MONOCYTES NFR BLD: 12 % (ref 3–10)
MONOCYTES NFR BLD: 7 % (ref 3–10)
MULTIPLIER, MUL: 0.02
MULTIPLIER, MUL: 0.03
MULTIPLIER, MUL: 0.04
MULTIPLIER, MUL: 0.05
NEUTS SEG # BLD: 3 K/UL (ref 1.8–8)
NEUTS SEG # BLD: 8.7 K/UL (ref 1.8–8)
NEUTS SEG NFR BLD: 57 % (ref 40–73)
NEUTS SEG NFR BLD: 84 % (ref 40–73)
NRBC # BLD: 0 K/UL (ref 0–0.01)
NRBC # BLD: 0 K/UL (ref 0–0.01)
NRBC BLD-RTO: 0 PER 100 WBC
NRBC BLD-RTO: 0 PER 100 WBC
O2/TOTAL GAS SETTING VFR VENT: 40 %
ORDER INITIALS, ORDINIT: NORMAL
P-R INTERVAL, ECG05: 148 MS
PCO2 BLD: 35.3 MMHG (ref 35–45)
PCO2 BLD: 37.1 MMHG (ref 35–45)
PCO2 BLD: 37.3 MMHG (ref 35–45)
PCO2 BLD: 37.7 MMHG (ref 35–45)
PCO2 BLD: 38 MMHG (ref 35–45)
PCO2 BLD: 38.1 MMHG (ref 35–45)
PCO2 BLD: 40.2 MMHG (ref 35–45)
PCO2 BLD: 40.2 MMHG (ref 35–45)
PCO2 BLD: 43 MMHG (ref 35–45)
PCO2 BLD: 45.3 MMHG (ref 35–45)
PCO2 BLD: 45.3 MMHG (ref 35–45)
PCO2 BLD: 45.4 MMHG (ref 35–45)
PCO2 BLD: 46.2 MMHG (ref 35–45)
PCO2 BLD: 48.4 MMHG (ref 35–45)
PCO2 BLD: 48.9 MMHG (ref 35–45)
PEEP RESPIRATORY: 5
PEEP RESPIRATORY: 5 CMH2O
PH BLD: 7.28 (ref 7.35–7.45)
PH BLD: 7.32 (ref 7.35–7.45)
PH BLD: 7.33 (ref 7.35–7.45)
PH BLD: 7.34 (ref 7.35–7.45)
PH BLD: 7.35 (ref 7.35–7.45)
PH BLD: 7.36 (ref 7.35–7.45)
PH BLD: 7.38 (ref 7.35–7.45)
PH BLD: 7.38 (ref 7.35–7.45)
PH BLD: 7.4 (ref 7.35–7.45)
PH BLD: 7.4 (ref 7.35–7.45)
PH BLD: 7.41 (ref 7.35–7.45)
PH BLD: 7.41 (ref 7.35–7.45)
PH BLD: 7.42 (ref 7.35–7.45)
PLATELET # BLD AUTO: 148 K/UL (ref 135–420)
PLATELET # BLD AUTO: 223 K/UL (ref 135–420)
PMV BLD AUTO: 10.3 FL (ref 9.2–11.8)
PMV BLD AUTO: 10.5 FL (ref 9.2–11.8)
PO2 BLD: 117 MMHG (ref 80–100)
PO2 BLD: 145 MMHG (ref 80–100)
PO2 BLD: 233 MMHG (ref 80–100)
PO2 BLD: 409 MMHG (ref 80–100)
PO2 BLD: 426 MMHG (ref 80–100)
PO2 BLD: 427 MMHG (ref 80–100)
PO2 BLD: 453 MMHG (ref 80–100)
PO2 BLD: 464 MMHG (ref 80–100)
PO2 BLD: 561 MMHG (ref 80–100)
PO2 BLD: 70 MMHG (ref 80–100)
PO2 BLD: 82 MMHG (ref 80–100)
PO2 BLD: 87 MMHG (ref 80–100)
PO2 BLD: 88 MMHG (ref 80–100)
PO2 BLD: 93 MMHG (ref 80–100)
PO2 BLD: 99 MMHG (ref 80–100)
POTASSIUM BLD-SCNC: 3.5 MMOL/L (ref 3.5–5.1)
POTASSIUM BLD-SCNC: 3.5 MMOL/L (ref 3.5–5.1)
POTASSIUM BLD-SCNC: 3.6 MMOL/L (ref 3.5–5.1)
POTASSIUM BLD-SCNC: 3.6 MMOL/L (ref 3.5–5.1)
POTASSIUM BLD-SCNC: 3.8 MMOL/L (ref 3.5–5.1)
POTASSIUM BLD-SCNC: 3.8 MMOL/L (ref 3.5–5.1)
POTASSIUM BLD-SCNC: 3.9 MMOL/L (ref 3.5–5.1)
POTASSIUM BLD-SCNC: 4 MMOL/L (ref 3.5–5.1)
POTASSIUM SERPL-SCNC: 3.6 MMOL/L (ref 3.5–5.5)
POTASSIUM SERPL-SCNC: 3.8 MMOL/L (ref 3.5–5.5)
PRESSURE SUPPORT SETTING VENT: 10 CMH2O
PROTHROMBIN TIME: 16.6 SEC (ref 11.5–15.2)
Q-T INTERVAL, ECG07: 496 MS
QRS DURATION, ECG06: 86 MS
QTC CALCULATION (BEZET), ECG08: 546 MS
RBC # BLD AUTO: 3.22 M/UL (ref 4.2–5.3)
RBC # BLD AUTO: 4.26 M/UL (ref 4.2–5.3)
SAO2 % BLD: 100 %
SAO2 % BLD: 92.2 % (ref 92–97)
SAO2 % BLD: 95.5 % (ref 92–97)
SAO2 % BLD: 96.1 % (ref 92–97)
SAO2 % BLD: 96.1 % (ref 92–97)
SAO2 % BLD: 96.2 % (ref 92–97)
SAO2 % BLD: 97.5 % (ref 92–97)
SAO2 % BLD: 98.1 % (ref 92–97)
SAO2 % BLD: 99 %
SERVICE CMNT-IMP: ABNORMAL
SODIUM BLD-SCNC: 139 MMOL/L (ref 136–145)
SODIUM BLD-SCNC: 140 MMOL/L (ref 136–145)
SODIUM BLD-SCNC: 140 MMOL/L (ref 136–145)
SODIUM BLD-SCNC: 141 MMOL/L (ref 136–145)
SODIUM BLD-SCNC: 143 MMOL/L (ref 136–145)
SODIUM SERPL-SCNC: 140 MMOL/L (ref 136–145)
SODIUM SERPL-SCNC: 142 MMOL/L (ref 136–145)
SPECIMEN SITE: ABNORMAL
SPECIMEN TYPE: ABNORMAL
TOTAL RESP. RATE, ITRR: 16
TOTAL RESP. RATE, ITRR: 20
VENTILATION MODE VENT: ABNORMAL
VENTRICULAR RATE, ECG03: 73 BPM
VT SETTING VENT: 420
VT SETTING VENT: 420 ML
WBC # BLD AUTO: 10.3 K/UL (ref 4.6–13.2)
WBC # BLD AUTO: 5.4 K/UL (ref 4.6–13.2)

## 2023-02-06 PROCEDURE — 77030013313: Performed by: THORACIC SURGERY (CARDIOTHORACIC VASCULAR SURGERY)

## 2023-02-06 PROCEDURE — 85610 PROTHROMBIN TIME: CPT

## 2023-02-06 PROCEDURE — 77030018673: Performed by: THORACIC SURGERY (CARDIOTHORACIC VASCULAR SURGERY)

## 2023-02-06 PROCEDURE — 77030020061 HC IV BLD WRMR ADMIN SET 3M -B: Performed by: ANESTHESIOLOGY

## 2023-02-06 PROCEDURE — 00567 ANES DIRECT CABG W/PUMP: CPT | Performed by: ANESTHESIOLOGY

## 2023-02-06 PROCEDURE — 74011250637 HC RX REV CODE- 250/637: Performed by: PHYSICIAN ASSISTANT

## 2023-02-06 PROCEDURE — 77030018547 HC SUT ETHBND1 J&J -B: Performed by: THORACIC SURGERY (CARDIOTHORACIC VASCULAR SURGERY)

## 2023-02-06 PROCEDURE — 71045 X-RAY EXAM CHEST 1 VIEW: CPT

## 2023-02-06 PROCEDURE — 77030015683 HC ADPT CRDPLG MEDT -B: Performed by: THORACIC SURGERY (CARDIOTHORACIC VASCULAR SURGERY)

## 2023-02-06 PROCEDURE — 77030026918 HC ADMN ST IV BLD BD -A: Performed by: ANESTHESIOLOGY

## 2023-02-06 PROCEDURE — 93005 ELECTROCARDIOGRAM TRACING: CPT

## 2023-02-06 PROCEDURE — 77030008500 HC TBNG CONN PRSS BD -A: Performed by: ANESTHESIOLOGY

## 2023-02-06 PROCEDURE — 94762 N-INVAS EAR/PLS OXIMTRY CONT: CPT

## 2023-02-06 PROCEDURE — 74011250636 HC RX REV CODE- 250/636: Performed by: ANESTHESIOLOGY

## 2023-02-06 PROCEDURE — 77030028842 HC SHNT IC CLRVW MEDT -B: Performed by: THORACIC SURGERY (CARDIOTHORACIC VASCULAR SURGERY)

## 2023-02-06 PROCEDURE — 36415 COLL VENOUS BLD VENIPUNCTURE: CPT

## 2023-02-06 PROCEDURE — 85347 COAGULATION TIME ACTIVATED: CPT

## 2023-02-06 PROCEDURE — 77030026855 HC PNCH AORT MYO AEMC -B: Performed by: THORACIC SURGERY (CARDIOTHORACIC VASCULAR SURGERY)

## 2023-02-06 PROCEDURE — 65620000000 HC RM CCU GENERAL

## 2023-02-06 PROCEDURE — 85730 THROMBOPLASTIN TIME PARTIAL: CPT

## 2023-02-06 PROCEDURE — 021109W BYPASS CORONARY ARTERY, TWO ARTERIES FROM AORTA WITH AUTOLOGOUS VENOUS TISSUE, OPEN APPROACH: ICD-10-PCS | Performed by: THORACIC SURGERY (CARDIOTHORACIC VASCULAR SURGERY)

## 2023-02-06 PROCEDURE — 77030012390 HC DRN CHST BTL GTNG -B: Performed by: THORACIC SURGERY (CARDIOTHORACIC VASCULAR SURGERY)

## 2023-02-06 PROCEDURE — 74011000258 HC RX REV CODE- 258: Performed by: THORACIC SURGERY (CARDIOTHORACIC VASCULAR SURGERY)

## 2023-02-06 PROCEDURE — 77030005401 HC CATH RAD ARRO -A: Performed by: ANESTHESIOLOGY

## 2023-02-06 PROCEDURE — 77030014491 HC PLEDG PTFE BARD -A: Performed by: THORACIC SURGERY (CARDIOTHORACIC VASCULAR SURGERY)

## 2023-02-06 PROCEDURE — 74011250636 HC RX REV CODE- 250/636: Performed by: THORACIC SURGERY (CARDIOTHORACIC VASCULAR SURGERY)

## 2023-02-06 PROCEDURE — 02100Z9 BYPASS CORONARY ARTERY, ONE ARTERY FROM LEFT INTERNAL MAMMARY, OPEN APPROACH: ICD-10-PCS | Performed by: THORACIC SURGERY (CARDIOTHORACIC VASCULAR SURGERY)

## 2023-02-06 PROCEDURE — 77030018390 HC SPNG HEMSTAT2 J&J -B: Performed by: THORACIC SURGERY (CARDIOTHORACIC VASCULAR SURGERY)

## 2023-02-06 PROCEDURE — 77030002986 HC SUT PROL J&J -A: Performed by: THORACIC SURGERY (CARDIOTHORACIC VASCULAR SURGERY)

## 2023-02-06 PROCEDURE — 36600 WITHDRAWAL OF ARTERIAL BLOOD: CPT

## 2023-02-06 PROCEDURE — 74011250636 HC RX REV CODE- 250/636: Performed by: PHYSICIAN ASSISTANT

## 2023-02-06 PROCEDURE — 82330 ASSAY OF CALCIUM: CPT

## 2023-02-06 PROCEDURE — 83605 ASSAY OF LACTIC ACID: CPT

## 2023-02-06 PROCEDURE — 74011000250 HC RX REV CODE- 250: Performed by: PHYSICIAN ASSISTANT

## 2023-02-06 PROCEDURE — 77030010929 HC CLMP VASC FGRTY EDWD -B: Performed by: THORACIC SURGERY (CARDIOTHORACIC VASCULAR SURGERY)

## 2023-02-06 PROCEDURE — 82803 BLOOD GASES ANY COMBINATION: CPT

## 2023-02-06 PROCEDURE — 77030011267 HC ELECTRD BLD COVD -A: Performed by: THORACIC SURGERY (CARDIOTHORACIC VASCULAR SURGERY)

## 2023-02-06 PROCEDURE — 80048 BASIC METABOLIC PNL TOTAL CA: CPT

## 2023-02-06 PROCEDURE — 77030008771 HC TU NG SALEM SUMP -A: Performed by: ANESTHESIOLOGY

## 2023-02-06 PROCEDURE — 77030005401 HC CATH RAD ARRO -A: Performed by: THORACIC SURGERY (CARDIOTHORACIC VASCULAR SURGERY)

## 2023-02-06 PROCEDURE — 77030042409 HC STOCK ANTIEMB -A

## 2023-02-06 PROCEDURE — 77030018719 HC DRSG PTCH ANTIMIC J&J -A: Performed by: ANESTHESIOLOGY

## 2023-02-06 PROCEDURE — 85025 COMPLETE CBC W/AUTO DIFF WBC: CPT

## 2023-02-06 PROCEDURE — 77030009814 HC LDWRE ECG PHIL -B

## 2023-02-06 PROCEDURE — 36556 INSERT NON-TUNNEL CV CATH: CPT | Performed by: ANESTHESIOLOGY

## 2023-02-06 PROCEDURE — 77030016037 HC MANFLD MULTI QUES -B: Performed by: ANESTHESIOLOGY

## 2023-02-06 PROCEDURE — 84295 ASSAY OF SERUM SODIUM: CPT

## 2023-02-06 PROCEDURE — 94002 VENT MGMT INPAT INIT DAY: CPT

## 2023-02-06 PROCEDURE — 84132 ASSAY OF SERUM POTASSIUM: CPT

## 2023-02-06 PROCEDURE — 74011636637 HC RX REV CODE- 636/637: Performed by: THORACIC SURGERY (CARDIOTHORACIC VASCULAR SURGERY)

## 2023-02-06 PROCEDURE — 93312 ECHO TRANSESOPHAGEAL: CPT | Performed by: ANESTHESIOLOGY

## 2023-02-06 PROCEDURE — 82947 ASSAY GLUCOSE BLOOD QUANT: CPT

## 2023-02-06 PROCEDURE — 77030010813: Performed by: THORACIC SURGERY (CARDIOTHORACIC VASCULAR SURGERY)

## 2023-02-06 PROCEDURE — 33533 CABG ARTERIAL SINGLE: CPT | Performed by: PHYSICIAN ASSISTANT

## 2023-02-06 PROCEDURE — 77030008683 HC TU ET CUF COVD -A: Performed by: ANESTHESIOLOGY

## 2023-02-06 PROCEDURE — 33518 CABG ARTERY-VEIN TWO: CPT | Performed by: PHYSICIAN ASSISTANT

## 2023-02-06 PROCEDURE — 77030031139 HC SUT VCRL2 J&J -A: Performed by: THORACIC SURGERY (CARDIOTHORACIC VASCULAR SURGERY)

## 2023-02-06 PROCEDURE — 77030012407 HC DRN WND BARD -B: Performed by: THORACIC SURGERY (CARDIOTHORACIC VASCULAR SURGERY)

## 2023-02-06 PROCEDURE — 9990 CHARGE CONVERSION

## 2023-02-06 PROCEDURE — 77030013140 HC MSK NEB VYRM -A: Performed by: ANESTHESIOLOGY

## 2023-02-06 PROCEDURE — 77030002945 HC SUT NRLN J&J -A: Performed by: THORACIC SURGERY (CARDIOTHORACIC VASCULAR SURGERY)

## 2023-02-06 PROCEDURE — 83735 ASSAY OF MAGNESIUM: CPT

## 2023-02-06 PROCEDURE — 77030019896 HC KT ARTERIAL LN TELE -B: Performed by: THORACIC SURGERY (CARDIOTHORACIC VASCULAR SURGERY)

## 2023-02-06 PROCEDURE — 77030010827: Performed by: THORACIC SURGERY (CARDIOTHORACIC VASCULAR SURGERY)

## 2023-02-06 PROCEDURE — 94660 CPAP INITIATION&MGMT: CPT

## 2023-02-06 PROCEDURE — 06BP4ZZ EXCISION OF RIGHT SAPHENOUS VEIN, PERCUTANEOUS ENDOSCOPIC APPROACH: ICD-10-PCS | Performed by: THORACIC SURGERY (CARDIOTHORACIC VASCULAR SURGERY)

## 2023-02-06 PROCEDURE — 77030040922 HC BLNKT HYPOTHRM STRY -A

## 2023-02-06 PROCEDURE — 5A09357 ASSISTANCE WITH RESPIRATORY VENTILATION, LESS THAN 24 CONSECUTIVE HOURS, CONTINUOUS POSITIVE AIRWAY PRESSURE: ICD-10-PCS | Performed by: PHYSICIAN ASSISTANT

## 2023-02-06 PROCEDURE — 77030002996 HC SUT SLK J&J -A: Performed by: ANESTHESIOLOGY

## 2023-02-06 PROCEDURE — 77030002933 HC SUT MCRYL J&J -A: Performed by: THORACIC SURGERY (CARDIOTHORACIC VASCULAR SURGERY)

## 2023-02-06 PROCEDURE — 33533 CABG ARTERIAL SINGLE: CPT | Performed by: THORACIC SURGERY (CARDIOTHORACIC VASCULAR SURGERY)

## 2023-02-06 PROCEDURE — 2709999900 HC NON-CHARGEABLE SUPPLY: Performed by: THORACIC SURGERY (CARDIOTHORACIC VASCULAR SURGERY)

## 2023-02-06 PROCEDURE — 82962 GLUCOSE BLOOD TEST: CPT

## 2023-02-06 PROCEDURE — C1769 GUIDE WIRE: HCPCS | Performed by: THORACIC SURGERY (CARDIOTHORACIC VASCULAR SURGERY)

## 2023-02-06 PROCEDURE — 76937 US GUIDE VASCULAR ACCESS: CPT | Performed by: ANESTHESIOLOGY

## 2023-02-06 PROCEDURE — 99232 SBSQ HOSP IP/OBS MODERATE 35: CPT | Performed by: INTERNAL MEDICINE

## 2023-02-06 PROCEDURE — 77030002987 HC SUT PROL J&J -B: Performed by: THORACIC SURGERY (CARDIOTHORACIC VASCULAR SURGERY)

## 2023-02-06 PROCEDURE — 77030018723 HC ELCTRD BLD COVD -A: Performed by: THORACIC SURGERY (CARDIOTHORACIC VASCULAR SURGERY)

## 2023-02-06 PROCEDURE — 33508 ENDOSCOPIC VEIN HARVEST: CPT | Performed by: PHYSICIAN ASSISTANT

## 2023-02-06 PROCEDURE — 77030022199 HC SYS HARV VESL GTNG -G1: Performed by: THORACIC SURGERY (CARDIOTHORACIC VASCULAR SURGERY)

## 2023-02-06 PROCEDURE — 74011000250 HC RX REV CODE- 250: Performed by: THORACIC SURGERY (CARDIOTHORACIC VASCULAR SURGERY)

## 2023-02-06 PROCEDURE — 77030040392 HC DRSG OPTIFOAM MDII -A: Performed by: THORACIC SURGERY (CARDIOTHORACIC VASCULAR SURGERY)

## 2023-02-06 PROCEDURE — 36620 INSERTION CATHETER ARTERY: CPT | Performed by: ANESTHESIOLOGY

## 2023-02-06 PROCEDURE — 5A1221Z PERFORMANCE OF CARDIAC OUTPUT, CONTINUOUS: ICD-10-PCS | Performed by: THORACIC SURGERY (CARDIOTHORACIC VASCULAR SURGERY)

## 2023-02-06 PROCEDURE — 65270000029 HC RM PRIVATE

## 2023-02-06 PROCEDURE — 77030018729 HC ELECTRD DEFIB PAD CARD -B: Performed by: ANESTHESIOLOGY

## 2023-02-06 PROCEDURE — C1751 CATH, INF, PER/CENT/MIDLINE: HCPCS | Performed by: THORACIC SURGERY (CARDIOTHORACIC VASCULAR SURGERY)

## 2023-02-06 PROCEDURE — 93320 DOPPLER ECHO COMPLETE: CPT | Performed by: ANESTHESIOLOGY

## 2023-02-06 PROCEDURE — 74011000258 HC RX REV CODE- 258: Performed by: ANESTHESIOLOGY

## 2023-02-06 PROCEDURE — 77030007667 HC INSRT SUT HLD MEDT -B: Performed by: THORACIC SURGERY (CARDIOTHORACIC VASCULAR SURGERY)

## 2023-02-06 PROCEDURE — 77030020178 HC LP VESL TW QUES -B: Performed by: THORACIC SURGERY (CARDIOTHORACIC VASCULAR SURGERY)

## 2023-02-06 PROCEDURE — 77030013797 HC KT TRNSDUC PRSSR EDWD -A: Performed by: ANESTHESIOLOGY

## 2023-02-06 PROCEDURE — 77030025415: Performed by: THORACIC SURGERY (CARDIOTHORACIC VASCULAR SURGERY)

## 2023-02-06 PROCEDURE — 74011000250 HC RX REV CODE- 250: Performed by: ANESTHESIOLOGY

## 2023-02-06 PROCEDURE — 77030034848: Performed by: THORACIC SURGERY (CARDIOTHORACIC VASCULAR SURGERY)

## 2023-02-06 PROCEDURE — 77030041021 HC STBLZR CARD OCTPS MEDT -G1: Performed by: THORACIC SURGERY (CARDIOTHORACIC VASCULAR SURGERY)

## 2023-02-06 PROCEDURE — 33518 CABG ARTERY-VEIN TWO: CPT | Performed by: THORACIC SURGERY (CARDIOTHORACIC VASCULAR SURGERY)

## 2023-02-06 PROCEDURE — 93325 DOPPLER ECHO COLOR FLOW MAPG: CPT | Performed by: ANESTHESIOLOGY

## 2023-02-06 PROCEDURE — P9045 ALBUMIN (HUMAN), 5%, 250 ML: HCPCS

## 2023-02-06 PROCEDURE — 77030018836 HC SOL IRR NACL ICUM -A: Performed by: THORACIC SURGERY (CARDIOTHORACIC VASCULAR SURGERY)

## 2023-02-06 PROCEDURE — P9045 ALBUMIN (HUMAN), 5%, 250 ML: HCPCS | Performed by: THORACIC SURGERY (CARDIOTHORACIC VASCULAR SURGERY)

## 2023-02-06 PROCEDURE — 76060000043 HC ANESTHESIA 6 TO 6.5 HR: Performed by: THORACIC SURGERY (CARDIOTHORACIC VASCULAR SURGERY)

## 2023-02-06 PROCEDURE — 76010000219 HC CV SURG 6 TO 6.5 HR INTENSV-TIER 1: Performed by: THORACIC SURGERY (CARDIOTHORACIC VASCULAR SURGERY)

## 2023-02-06 PROCEDURE — 33508 ENDOSCOPIC VEIN HARVEST: CPT | Performed by: THORACIC SURGERY (CARDIOTHORACIC VASCULAR SURGERY)

## 2023-02-06 PROCEDURE — 77030033036 HC BLWR MISTR ACUMIST MEDT -C: Performed by: THORACIC SURGERY (CARDIOTHORACIC VASCULAR SURGERY)

## 2023-02-06 PROCEDURE — P9045 ALBUMIN (HUMAN), 5%, 250 ML: HCPCS | Performed by: PHYSICIAN ASSISTANT

## 2023-02-06 PROCEDURE — 2709999900 HC NON-CHARGEABLE SUPPLY

## 2023-02-06 PROCEDURE — 77030002912 HC SUT ETHBND J&J -A: Performed by: THORACIC SURGERY (CARDIOTHORACIC VASCULAR SURGERY)

## 2023-02-06 PROCEDURE — 77030010818: Performed by: THORACIC SURGERY (CARDIOTHORACIC VASCULAR SURGERY)

## 2023-02-06 RX ORDER — DESMOPRESSIN ACETATE 4 UG/ML
INJECTION, SOLUTION INTRAVENOUS; SUBCUTANEOUS
Status: COMPLETED
Start: 2023-02-06 | End: 2023-02-06

## 2023-02-06 RX ORDER — FENTANYL CITRATE 50 UG/ML
12.5-25 INJECTION, SOLUTION INTRAMUSCULAR; INTRAVENOUS
Status: DISCONTINUED | OUTPATIENT
Start: 2023-02-06 | End: 2023-02-10

## 2023-02-06 RX ORDER — FENTANYL CITRATE 50 UG/ML
INJECTION, SOLUTION INTRAMUSCULAR; INTRAVENOUS AS NEEDED
Status: DISCONTINUED | OUTPATIENT
Start: 2023-02-06 | End: 2023-02-06 | Stop reason: HOSPADM

## 2023-02-06 RX ORDER — ATORVASTATIN CALCIUM 20 MG/1
20 TABLET, FILM COATED ORAL EVERY EVENING
Status: DISCONTINUED | OUTPATIENT
Start: 2023-02-06 | End: 2023-02-11 | Stop reason: HOSPADM

## 2023-02-06 RX ORDER — AMIODARONE HYDROCHLORIDE 150 MG/3ML
INJECTION, SOLUTION INTRAVENOUS AS NEEDED
Status: DISCONTINUED | OUTPATIENT
Start: 2023-02-06 | End: 2023-02-06 | Stop reason: HOSPADM

## 2023-02-06 RX ORDER — IBUPROFEN 200 MG
4 TABLET ORAL AS NEEDED
Status: DISCONTINUED | OUTPATIENT
Start: 2023-02-06 | End: 2023-02-07

## 2023-02-06 RX ORDER — ALBUMIN HUMAN 50 G/1000ML
25 SOLUTION INTRAVENOUS ONCE
Status: COMPLETED | OUTPATIENT
Start: 2023-02-06 | End: 2023-02-06

## 2023-02-06 RX ORDER — DESMOPRESSIN ACETATE 4 UG/ML
INJECTION, SOLUTION INTRAVENOUS; SUBCUTANEOUS AS NEEDED
Status: DISCONTINUED | OUTPATIENT
Start: 2023-02-06 | End: 2023-02-06 | Stop reason: HOSPADM

## 2023-02-06 RX ORDER — SODIUM CHLORIDE 0.9 % (FLUSH) 0.9 %
5-40 SYRINGE (ML) INJECTION AS NEEDED
Status: DISCONTINUED | OUTPATIENT
Start: 2023-02-06 | End: 2023-02-11 | Stop reason: HOSPADM

## 2023-02-06 RX ORDER — HYDROMORPHONE HYDROCHLORIDE 2 MG/ML
INJECTION, SOLUTION INTRAMUSCULAR; INTRAVENOUS; SUBCUTANEOUS AS NEEDED
Status: DISCONTINUED | OUTPATIENT
Start: 2023-02-06 | End: 2023-02-06 | Stop reason: HOSPADM

## 2023-02-06 RX ORDER — PROTAMINE SULFATE 10 MG/ML
INJECTION, SOLUTION INTRAVENOUS AS NEEDED
Status: DISCONTINUED | OUTPATIENT
Start: 2023-02-06 | End: 2023-02-06 | Stop reason: HOSPADM

## 2023-02-06 RX ORDER — PROPOFOL 10 MG/ML
INJECTION, EMULSION INTRAVENOUS
Status: COMPLETED
Start: 2023-02-06 | End: 2023-02-06

## 2023-02-06 RX ORDER — ALBUTEROL SULFATE 0.83 MG/ML
2.5 SOLUTION RESPIRATORY (INHALATION)
Status: DISCONTINUED | OUTPATIENT
Start: 2023-02-06 | End: 2023-02-11 | Stop reason: HOSPADM

## 2023-02-06 RX ORDER — NALOXONE HYDROCHLORIDE 0.4 MG/ML
0.4 INJECTION, SOLUTION INTRAMUSCULAR; INTRAVENOUS; SUBCUTANEOUS AS NEEDED
Status: DISCONTINUED | OUTPATIENT
Start: 2023-02-06 | End: 2023-02-11 | Stop reason: HOSPADM

## 2023-02-06 RX ORDER — NEOSTIGMINE METHYLSULFATE 1 MG/ML
INJECTION, SOLUTION INTRAVENOUS AS NEEDED
Status: DISCONTINUED | OUTPATIENT
Start: 2023-02-06 | End: 2023-02-06 | Stop reason: HOSPADM

## 2023-02-06 RX ORDER — SODIUM BICARBONATE 1 MEQ/ML
100 SYRINGE (ML) INTRAVENOUS
Status: COMPLETED | OUTPATIENT
Start: 2023-02-06 | End: 2023-02-06

## 2023-02-06 RX ORDER — SODIUM CHLORIDE 0.9 % (FLUSH) 0.9 %
5-40 SYRINGE (ML) INJECTION EVERY 8 HOURS
Status: DISCONTINUED | OUTPATIENT
Start: 2023-02-06 | End: 2023-02-11 | Stop reason: HOSPADM

## 2023-02-06 RX ORDER — ACETAMINOPHEN 325 MG/1
650 TABLET ORAL
Status: DISCONTINUED | OUTPATIENT
Start: 2023-02-06 | End: 2023-02-11 | Stop reason: HOSPADM

## 2023-02-06 RX ORDER — LIDOCAINE HYDROCHLORIDE 20 MG/ML
INJECTION, SOLUTION EPIDURAL; INFILTRATION; INTRACAUDAL; PERINEURAL AS NEEDED
Status: DISCONTINUED | OUTPATIENT
Start: 2023-02-06 | End: 2023-02-06 | Stop reason: HOSPADM

## 2023-02-06 RX ORDER — CHLORHEXIDINE GLUCONATE 1.2 MG/ML
10 RINSE ORAL 2 TIMES DAILY
Status: DISCONTINUED | OUTPATIENT
Start: 2023-02-06 | End: 2023-02-11 | Stop reason: HOSPADM

## 2023-02-06 RX ORDER — BISACODYL 5 MG
10 TABLET, DELAYED RELEASE (ENTERIC COATED) ORAL DAILY
Status: DISCONTINUED | OUTPATIENT
Start: 2023-02-07 | End: 2023-02-11 | Stop reason: HOSPADM

## 2023-02-06 RX ORDER — POTASSIUM CHLORIDE 14.9 MG/ML
20 INJECTION INTRAVENOUS
Status: COMPLETED | OUTPATIENT
Start: 2023-02-06 | End: 2023-02-06

## 2023-02-06 RX ORDER — DEXTROSE MONOHYDRATE 100 MG/ML
0-250 INJECTION, SOLUTION INTRAVENOUS AS NEEDED
Status: DISCONTINUED | OUTPATIENT
Start: 2023-02-06 | End: 2023-02-07

## 2023-02-06 RX ORDER — MORPHINE SULFATE 2 MG/ML
2-4 INJECTION, SOLUTION INTRAMUSCULAR; INTRAVENOUS
Status: DISCONTINUED | OUTPATIENT
Start: 2023-02-06 | End: 2023-02-11 | Stop reason: HOSPADM

## 2023-02-06 RX ORDER — FAMOTIDINE 20 MG/1
20 TABLET, FILM COATED ORAL 2 TIMES DAILY
Status: DISCONTINUED | OUTPATIENT
Start: 2023-02-06 | End: 2023-02-11 | Stop reason: HOSPADM

## 2023-02-06 RX ORDER — VANCOMYCIN HYDROCHLORIDE 1 G/20ML
INJECTION, POWDER, LYOPHILIZED, FOR SOLUTION INTRAVENOUS
Status: DISPENSED
Start: 2023-02-06 | End: 2023-02-06

## 2023-02-06 RX ORDER — MIDAZOLAM HYDROCHLORIDE 1 MG/ML
INJECTION, SOLUTION INTRAMUSCULAR; INTRAVENOUS AS NEEDED
Status: DISCONTINUED | OUTPATIENT
Start: 2023-02-06 | End: 2023-02-06 | Stop reason: HOSPADM

## 2023-02-06 RX ORDER — MUPIROCIN 20 MG/G
OINTMENT TOPICAL 2 TIMES DAILY
Status: COMPLETED | OUTPATIENT
Start: 2023-02-06 | End: 2023-02-08

## 2023-02-06 RX ORDER — POTASSIUM CHLORIDE 14.9 MG/ML
20 INJECTION INTRAVENOUS
Status: DISCONTINUED | OUTPATIENT
Start: 2023-02-06 | End: 2023-02-06

## 2023-02-06 RX ORDER — POLYETHYLENE GLYCOL 3350 17 G/17G
17 POWDER, FOR SOLUTION ORAL DAILY
Status: DISCONTINUED | OUTPATIENT
Start: 2023-02-07 | End: 2023-02-11 | Stop reason: HOSPADM

## 2023-02-06 RX ORDER — VANCOMYCIN HYDROCHLORIDE 1 G/20ML
INJECTION, POWDER, LYOPHILIZED, FOR SOLUTION INTRAVENOUS AS NEEDED
Status: DISCONTINUED | OUTPATIENT
Start: 2023-02-06 | End: 2023-02-06 | Stop reason: HOSPADM

## 2023-02-06 RX ORDER — PAPAVERINE HYDROCHLORIDE 30 MG/ML
INJECTION INTRAMUSCULAR; INTRAVENOUS
Status: DISPENSED
Start: 2023-02-06 | End: 2023-02-06

## 2023-02-06 RX ORDER — ROCURONIUM BROMIDE 10 MG/ML
INJECTION, SOLUTION INTRAVENOUS AS NEEDED
Status: DISCONTINUED | OUTPATIENT
Start: 2023-02-06 | End: 2023-02-06 | Stop reason: HOSPADM

## 2023-02-06 RX ORDER — SODIUM CHLORIDE 9 MG/ML
10 INJECTION, SOLUTION INTRAVENOUS CONTINUOUS
Status: DISCONTINUED | OUTPATIENT
Start: 2023-02-06 | End: 2023-02-11 | Stop reason: HOSPADM

## 2023-02-06 RX ORDER — PROPOFOL 10 MG/ML
INJECTION, EMULSION INTRAVENOUS AS NEEDED
Status: DISCONTINUED | OUTPATIENT
Start: 2023-02-06 | End: 2023-02-06 | Stop reason: HOSPADM

## 2023-02-06 RX ORDER — HEPARIN SODIUM 1000 [USP'U]/ML
INJECTION, SOLUTION INTRAVENOUS; SUBCUTANEOUS
Status: COMPLETED
Start: 2023-02-06 | End: 2023-02-06

## 2023-02-06 RX ORDER — HEPARIN SODIUM 5000 [USP'U]/ML
5000 INJECTION, SOLUTION INTRAVENOUS; SUBCUTANEOUS EVERY 8 HOURS
Status: DISCONTINUED | OUTPATIENT
Start: 2023-02-07 | End: 2023-02-11 | Stop reason: HOSPADM

## 2023-02-06 RX ORDER — NICARDIPINE HYDROCHLORIDE 0.1 MG/ML
5-15 INJECTION INTRAVENOUS
Status: DISCONTINUED | OUTPATIENT
Start: 2023-02-06 | End: 2023-02-07

## 2023-02-06 RX ORDER — HYDROCODONE BITARTRATE AND ACETAMINOPHEN 5; 325 MG/1; MG/1
1-2 TABLET ORAL
Status: DISCONTINUED | OUTPATIENT
Start: 2023-02-06 | End: 2023-02-07

## 2023-02-06 RX ORDER — PROPOFOL 10 MG/ML
0-50 VIAL (ML) INTRAVENOUS
Status: DISCONTINUED | OUTPATIENT
Start: 2023-02-06 | End: 2023-02-07

## 2023-02-06 RX ORDER — ASPIRIN 81 MG/1
81 TABLET ORAL DAILY
Status: DISCONTINUED | OUTPATIENT
Start: 2023-02-07 | End: 2023-02-11 | Stop reason: HOSPADM

## 2023-02-06 RX ORDER — ONDANSETRON 2 MG/ML
4 INJECTION INTRAMUSCULAR; INTRAVENOUS
Status: DISCONTINUED | OUTPATIENT
Start: 2023-02-06 | End: 2023-02-11 | Stop reason: HOSPADM

## 2023-02-06 RX ORDER — HEPARIN SODIUM 1000 [USP'U]/ML
INJECTION, SOLUTION INTRAVENOUS; SUBCUTANEOUS AS NEEDED
Status: DISCONTINUED | OUTPATIENT
Start: 2023-02-06 | End: 2023-02-06 | Stop reason: HOSPADM

## 2023-02-06 RX ORDER — PHENYLEPHRINE HCL IN 0.9% NACL 50MG/250ML
10-100 PLASTIC BAG, INJECTION (ML) INTRAVENOUS
Status: DISCONTINUED | OUTPATIENT
Start: 2023-02-06 | End: 2023-02-09

## 2023-02-06 RX ORDER — AMINOCAPROIC ACID 250 MG/ML
INJECTION, SOLUTION INTRAVENOUS AS NEEDED
Status: DISCONTINUED | OUTPATIENT
Start: 2023-02-06 | End: 2023-02-06 | Stop reason: HOSPADM

## 2023-02-06 RX ORDER — CLOPIDOGREL BISULFATE 75 MG/1
75 TABLET ORAL DAILY
Status: DISCONTINUED | OUTPATIENT
Start: 2023-02-10 | End: 2023-02-09

## 2023-02-06 RX ORDER — ALBUMIN HUMAN 50 G/1000ML
12.5 SOLUTION INTRAVENOUS ONCE
Status: DISCONTINUED | OUTPATIENT
Start: 2023-02-06 | End: 2023-02-06

## 2023-02-06 RX ORDER — FACIAL-BODY WIPES
10 EACH TOPICAL DAILY PRN
Status: DISCONTINUED | OUTPATIENT
Start: 2023-02-06 | End: 2023-02-11 | Stop reason: HOSPADM

## 2023-02-06 RX ORDER — AMIODARONE HYDROCHLORIDE 200 MG/1
200 TABLET ORAL 3 TIMES DAILY
Status: DISCONTINUED | OUTPATIENT
Start: 2023-02-06 | End: 2023-02-10

## 2023-02-06 RX ORDER — NOREPINEPHRINE BIT/0.9 % NACL 16MG/250ML
INFUSION BOTTLE (ML) INTRAVENOUS
Status: DISPENSED
Start: 2023-02-06 | End: 2023-02-07

## 2023-02-06 RX ORDER — CALCIUM GLUCONATE 20 MG/ML
1 INJECTION, SOLUTION INTRAVENOUS
Status: COMPLETED | OUTPATIENT
Start: 2023-02-06 | End: 2023-02-06

## 2023-02-06 RX ORDER — ALBUMIN HUMAN 50 G/1000ML
12.5 SOLUTION INTRAVENOUS AS NEEDED
Status: COMPLETED | OUTPATIENT
Start: 2023-02-06 | End: 2023-02-06

## 2023-02-06 RX ORDER — SUCCINYLCHOLINE CHLORIDE 20 MG/ML
INJECTION INTRAMUSCULAR; INTRAVENOUS AS NEEDED
Status: DISCONTINUED | OUTPATIENT
Start: 2023-02-06 | End: 2023-02-06 | Stop reason: HOSPADM

## 2023-02-06 RX ADMIN — AMINOCAPROIC ACID 1 G/HR: 250 INJECTION, SOLUTION INTRAVENOUS at 08:59

## 2023-02-06 RX ADMIN — FENTANYL CITRATE 25 MCG: 50 INJECTION, SOLUTION INTRAMUSCULAR; INTRAVENOUS at 18:36

## 2023-02-06 RX ADMIN — AMINOCAPROIC ACID 5 G: 250 INJECTION, SOLUTION INTRAVENOUS at 08:59

## 2023-02-06 RX ADMIN — SODIUM CHLORIDE, PRESERVATIVE FREE 10 ML: 5 INJECTION INTRAVENOUS at 17:38

## 2023-02-06 RX ADMIN — SUCCINYLCHOLINE CHLORIDE 100 MG: 20 INJECTION, SOLUTION INTRAMUSCULAR; INTRAVENOUS at 07:45

## 2023-02-06 RX ADMIN — FENTANYL CITRATE 50 MCG: 50 INJECTION INTRAMUSCULAR; INTRAVENOUS at 13:14

## 2023-02-06 RX ADMIN — ROCURONIUM BROMIDE 20 MG: 50 INJECTION INTRAVENOUS at 10:59

## 2023-02-06 RX ADMIN — METOPROLOL TARTRATE 12.5 MG: 25 TABLET, FILM COATED ORAL at 06:49

## 2023-02-06 RX ADMIN — ALBUMIN (HUMAN) 12.5 G: 12.5 INJECTION, SOLUTION INTRAVENOUS at 15:30

## 2023-02-06 RX ADMIN — MIDAZOLAM HYDROCHLORIDE 2 MG: 2 INJECTION, SOLUTION INTRAMUSCULAR; INTRAVENOUS at 11:38

## 2023-02-06 RX ADMIN — NEOSTIGMINE METHYLSULFATE 3 MG: 1 INJECTION, SOLUTION INTRAVENOUS at 13:30

## 2023-02-06 RX ADMIN — HEPARIN SODIUM 25000 UNITS: 1000 INJECTION, SOLUTION INTRAVENOUS; SUBCUTANEOUS at 09:56

## 2023-02-06 RX ADMIN — ALBUMIN (HUMAN) 12.5 G: 12.5 INJECTION, SOLUTION INTRAVENOUS at 14:40

## 2023-02-06 RX ADMIN — HEPARIN SODIUM 5000 UNITS: 1000 INJECTION, SOLUTION INTRAVENOUS; SUBCUTANEOUS at 09:29

## 2023-02-06 RX ADMIN — FAMOTIDINE 20 MG: 20 TABLET, FILM COATED ORAL at 23:00

## 2023-02-06 RX ADMIN — POTASSIUM CHLORIDE 20 MEQ: 14.9 INJECTION, SOLUTION INTRAVENOUS at 17:37

## 2023-02-06 RX ADMIN — FENTANYL CITRATE 50 MCG: 50 INJECTION INTRAMUSCULAR; INTRAVENOUS at 11:59

## 2023-02-06 RX ADMIN — HYDROMORPHONE HYDROCHLORIDE 1 MG: 2 INJECTION, SOLUTION INTRAMUSCULAR; INTRAVENOUS; SUBCUTANEOUS at 10:28

## 2023-02-06 RX ADMIN — POTASSIUM CHLORIDE 20 MEQ: 14.9 INJECTION, SOLUTION INTRAVENOUS at 15:50

## 2023-02-06 RX ADMIN — ALBUMIN (HUMAN) 12.5 G: 12.5 INJECTION, SOLUTION INTRAVENOUS at 13:54

## 2023-02-06 RX ADMIN — PROTAMINE SULFATE 250 MG: 10 INJECTION, SOLUTION INTRAVENOUS at 12:44

## 2023-02-06 RX ADMIN — SODIUM CHLORIDE, PRESERVATIVE FREE 10 ML: 5 INJECTION INTRAVENOUS at 06:48

## 2023-02-06 RX ADMIN — NICARDIPINE HYDROCHLORIDE 2.5 MG/HR: 0.1 INJECTION INTRAVENOUS at 15:50

## 2023-02-06 RX ADMIN — ROCURONIUM BROMIDE 50 MG: 50 INJECTION INTRAVENOUS at 07:45

## 2023-02-06 RX ADMIN — AMIODARONE HYDROCHLORIDE 200 MG: 200 TABLET ORAL at 23:00

## 2023-02-06 RX ADMIN — PHENYLEPHRINE HYDROCHLORIDE 20 MCG/MIN: 10 INJECTION INTRAVENOUS at 12:38

## 2023-02-06 RX ADMIN — ALBUMIN (HUMAN) 25 G: 12.5 INJECTION, SOLUTION INTRAVENOUS at 17:02

## 2023-02-06 RX ADMIN — SODIUM CHLORIDE, PRESERVATIVE FREE 10 ML: 5 INJECTION INTRAVENOUS at 22:00

## 2023-02-06 RX ADMIN — ATORVASTATIN CALCIUM 20 MG: 20 TABLET, FILM COATED ORAL at 17:45

## 2023-02-06 RX ADMIN — CALCIUM GLUCONATE 1000 MG: 20 INJECTION, SOLUTION INTRAVENOUS at 16:06

## 2023-02-06 RX ADMIN — MIDAZOLAM HYDROCHLORIDE 2 MG: 2 INJECTION, SOLUTION INTRAMUSCULAR; INTRAVENOUS at 10:28

## 2023-02-06 RX ADMIN — CEFAZOLIN SODIUM 2 G: 1 INJECTION, POWDER, FOR SOLUTION INTRAMUSCULAR; INTRAVENOUS at 08:56

## 2023-02-06 RX ADMIN — FENTANYL CITRATE 50 MCG: 50 INJECTION INTRAMUSCULAR; INTRAVENOUS at 12:24

## 2023-02-06 RX ADMIN — CEFAZOLIN 2 G: 1 INJECTION, POWDER, FOR SOLUTION INTRAMUSCULAR; INTRAVENOUS at 19:45

## 2023-02-06 RX ADMIN — CHLORHEXIDINE GLUCONATE 0.12% ORAL RINSE 10 ML: 1.2 LIQUID ORAL at 17:45

## 2023-02-06 RX ADMIN — FENTANYL CITRATE 50 MCG: 50 INJECTION INTRAMUSCULAR; INTRAVENOUS at 07:45

## 2023-02-06 RX ADMIN — FENTANYL CITRATE 50 MCG: 50 INJECTION INTRAMUSCULAR; INTRAVENOUS at 12:50

## 2023-02-06 RX ADMIN — CALCIUM GLUCONATE 1000 MG: 20 INJECTION, SOLUTION INTRAVENOUS at 17:31

## 2023-02-06 RX ADMIN — PROPOFOL 25 MCG/KG/MIN: 10 INJECTION, EMULSION INTRAVENOUS at 13:14

## 2023-02-06 RX ADMIN — CEFAZOLIN SODIUM 2 G: 1 INJECTION, POWDER, FOR SOLUTION INTRAMUSCULAR; INTRAVENOUS at 12:44

## 2023-02-06 RX ADMIN — SODIUM CHLORIDE 10 ML/HR: 9 INJECTION, SOLUTION INTRAVENOUS at 15:29

## 2023-02-06 RX ADMIN — AMIODARONE HYDROCHLORIDE 150 MG: 50 INJECTION, SOLUTION INTRAVENOUS at 10:21

## 2023-02-06 RX ADMIN — Medication 10 MCG/MIN: at 16:50

## 2023-02-06 RX ADMIN — MORPHINE SULFATE 4 MG: 2 INJECTION, SOLUTION INTRAMUSCULAR; INTRAVENOUS at 15:35

## 2023-02-06 RX ADMIN — SODIUM BICARBONATE 100 MEQ: 84 INJECTION INTRAVENOUS at 18:25

## 2023-02-06 RX ADMIN — FAMOTIDINE 20 MG: 10 INJECTION, SOLUTION INTRAVENOUS at 15:30

## 2023-02-06 RX ADMIN — DESMOPRESSIN ACETATE 16 MCG: 4 INJECTION INTRAVENOUS; SUBCUTANEOUS at 12:03

## 2023-02-06 RX ADMIN — LIDOCAINE HYDROCHLORIDE 40 MG: 20 INJECTION, SOLUTION EPIDURAL; INFILTRATION; INTRACAUDAL; PERINEURAL at 07:45

## 2023-02-06 RX ADMIN — MIDAZOLAM HYDROCHLORIDE 2 MG: 2 INJECTION, SOLUTION INTRAMUSCULAR; INTRAVENOUS at 07:45

## 2023-02-06 RX ADMIN — NOREPINEPHRINE BITARTRATE 2 MCG/MIN: 1 INJECTION, SOLUTION, CONCENTRATE INTRAVENOUS at 12:49

## 2023-02-06 RX ADMIN — PROPOFOL 150 MG: 10 INJECTION, EMULSION INTRAVENOUS at 07:45

## 2023-02-06 RX ADMIN — MUPIROCIN: 20 OINTMENT TOPICAL at 17:45

## 2023-02-06 RX ADMIN — SODIUM CHLORIDE 100 ML/HR: 9 INJECTION, SOLUTION INTRAVENOUS at 06:29

## 2023-02-06 RX ADMIN — MORPHINE SULFATE 4 MG: 2 INJECTION, SOLUTION INTRAMUSCULAR; INTRAVENOUS at 20:56

## 2023-02-06 RX ADMIN — HYDROMORPHONE HYDROCHLORIDE 1 MG: 2 INJECTION, SOLUTION INTRAMUSCULAR; INTRAVENOUS; SUBCUTANEOUS at 09:03

## 2023-02-06 NOTE — ANESTHESIA PROCEDURE NOTES
Arterial Line Placement    Start time: 2/6/2023 9:23 AM  End time: 2/6/2023 9:23 AM  Performed by: Nagi Cunningham MD  Authorized by: Nagi Cunningham MD     Pre-Procedure  Indications:  Arterial pressure monitoring and blood sampling  Preanesthetic Checklist: patient identified, risks and benefits discussed, anesthesia consent, site marked, patient being monitored, timeout performed, patient being monitored and fire risk safety assessment completed and verbalized    Timeout Time: 09:23 EST      Procedure:   Prep:  Chlorhexidine  Seldinger Technique?: Yes    Orientation:  Left  Location:  Radial artery  Catheter size:  20 G  Number of attempts:  1  Cont Cardiac Output Sensor: No      Assessment:   Post-procedure:  Line secured and sterile dressing applied  Patient Tolerance:  Patient tolerated the procedure well with no immediate complications

## 2023-02-06 NOTE — ANESTHESIA PROCEDURE NOTES
MARV  Date/Time: 2/6/2023 9:21 AM      Procedure Details: probe placement, image aquisition & interpretation    Site marked, Timeout performed, 09:21 EST  Risks and benefits discussed with the patient and plans are to proceed    Procedure Note    Performed by: Satinder Robins MD  Authorized by: Satinder Robins MD     Indications: assessment of ascending aorta and assessment of surgical repair  Modalities: PWD, CWD, CF, 2D  Probe Type: multiplane  Insertion: atraumatic  Patient Status: intubated and sedated  Echocardiographic and Doppler Measurements   Aorta  Size  Diam(cm)  Dissection PlaqueThick(mm)  Plaque Mobile    Ascending normal 2.8 No 0-3 No    Arch normal  No 0-3 No    Descending normal  No 0-3 No          Valves  Annulus  Stenosis  Area/Grad  Regurg  Leaflet   Morph  Leaflet   Motion    Aortic normal none  0 normal normal    Mitral normal none  0 normal normal    Tricuspid normal none  1+ normal normal          Atria  Size  SEC (smoke)  Thrombus  Tumor  Device    Rt Atrium normal No No No No    Lt Atrium normal No No No No     Interatrial Septum Morphology: lipomatous hypertrophy    Interventricular Septum Morphology: normal  Ventricle  Cavity Size  Cavity Dimension Hypertrophy  Thrombus  Gloal FXN  EF    RV normal  No no normal     LV normal   No mildly impaired 50       Regional Function  (1 = normal, 2 = mildly hypokinetic, 3 = severely hypokinetic, 4 = akinetic, 5 = dyskinetic) LAV - [place]   ME LAV = 0  ME LAV = 90  ME LAV = 130                                     Pericardium: normal    Post Intervention Follow-up Study  Ventricular Global Function: unchanged  Ventricular Regional Function: unchanged     Valve  Function  Regurgitation  Area    Aortic no change      Mitral no change      Tricuspid no change      Prosthetic        Complications: None

## 2023-02-06 NOTE — PROGRESS NOTES
[facility] - Progress Note  Admit Date: 2/3/2023    Assessment:     -s/p CABG 2/6/23 by Dr. Ricki Arnold, Mony Henriquez, SVG-OM, SVG-PDA  -LHC on 02/4/23 with RCA as well as left main disease.  -Echo 2/3/23 with EF 50-55%  -Obesity  -Diabetes  -Hypothyroidism  -Hypertension  -Hyperlipidemia     Primary cardiologist Dr. Pal Ac: Will follow with you. CABG today without complication. Continue supportive care. Subjective:     Seen post-op. Intubated.     Objective:      Patient Vitals for the past 8 hrs:   Temp Pulse Resp BP SpO2   02/06/23 0716 -- 63 17 (!) 161/71 93 %   02/06/23 0715 97.7 °F (36.5 °C) 62 19 (!) 170/82 96 %   02/06/23 0649 -- 68 -- -- --   02/06/23 0622 -- 67 19 134/67 --   02/06/23 0620 -- 60 13 125/77 --   02/06/23 0454 97.2 °F (36.2 °C) 64 15 (!) 111/57 96 %         Patient Vitals for the past 96 hrs:   Weight   02/05/23 0530 73 kg (160 lb 15 oz)   02/04/23 0353 75.3 kg (165 lb 14.4 oz)   02/03/23 0813 72.6 kg (160 lb)                    Intake/Output Summary (Last 24 hours) at 2/6/2023 1000  Last data filed at 2/6/2023 0939  Gross per 24 hour   Intake 850 ml   Output 1875 ml   Net -1025 ml       Physical Exam:  General:  intubated  Neck:  nontender  Lungs:  decreased  Heart:  regular rate and rhythm, S1, S2 normal, no murmur, click, rub or gallop  Abdomen:  abdomen is soft without significant tenderness, masses, organomegaly or guarding  Extremities:  extremities normal, atraumatic, no cyanosis or edema    Data Review:     Labs: Results:       Chemistry Recent Labs     02/06/23  0518 02/05/23  0539 02/04/23  0545   * 159* 145*    140 141   K 3.8 3.7 3.7    107 108   CO2 29 27 28   BUN 15 14 9   CREA 0.72 0.73 0.65   CA 8.9 8.8 8.7   AGAP 3 6 5   BUCR 21* 19 14   AP  --   --  88   TP  --   --  5.6*   ALB  --   --  3.3*   GLOB  --   --  2.3   AGRAT  --   --  1.4      CBC w/Diff Recent Labs     02/06/23  0518 02/05/23  0539 02/04/23  0545   WBC 5.4 5.9 4.5*   RBC 4. 26 4.20 3.92*   HGB 13.3 13.0 12.3   HCT 39.3 38.5 36.7    233 198   GRANS 57 60 53   LYMPH 27 27 31   EOS 4 4 6*      Cardiac Enzymes No results found for: CPK, CK, CKMMB, CKMB, RCK3, CKMBT, CKNDX, CKND1, LOYD, TROPT, TROIQ, BOY, TROPT, TNIPOC, BNP, BNPP   Coagulation No results for input(s): PTP, INR, APTT, INREXT in the last 72 hours.     Lipid Panel Lab Results   Component Value Date/Time    Cholesterol, total 185 02/04/2023 05:45 AM    HDL Cholesterol 40 02/04/2023 05:45 AM    LDL, calculated 108.6 (H) 02/04/2023 05:45 AM    VLDL, calculated 36.4 02/04/2023 05:45 AM    Triglyceride 182 (H) 02/04/2023 05:45 AM    CHOL/HDL Ratio 4.6 02/04/2023 05:45 AM      BNP No results found for: BNP, BNPP, XBNPT   Liver Enzymes Recent Labs     02/04/23  0545   TP 5.6*   ALB 3.3*   AP 88      Digoxin    Thyroid Studies Lab Results   Component Value Date/Time    TSH 1.40 02/04/2023 05:45 AM          Signed By: Analilia Valle MD     February 6, 2023

## 2023-02-06 NOTE — ANESTHESIA PROCEDURE NOTES
Central Line Placement    Start time: 2/6/2023 9:24 AM  End time: 2/6/2023 9:24 AM  Performed by: Shalini Fuentes MD  Authorized by: Shalini Fuentes MD     Indications: vascular access and central pressure monitoring  Preanesthetic Checklist: patient identified, risks and benefits discussed, anesthesia consent, site marked, patient being monitored, timeout performed and fire risk safety assessment completed and verbalized    Timeout Time: 09:24 EST       Pre-procedure: All elements of maximal sterile barrier technique followed? Yes    2% Chlorhexidine for cutaneous antisepsis, Hand hygiene performed prior to catheter insertion and Ultrasound guidance    Sterile Ultrasound Technique followed?: Yes        Ultrasound Image Stored?  Image stored    Procedure:   Prep:  Chlorhexidine  Location: internal jugular    Patient position:  Trendelenburg  Catheter type:  Single lumen  Catheter size:  7 Fr  Catheter length:  10 cm  Number of attempts:  1  Successful placement: Yes      Assessment:   Post-procedure:  Catheter secured, sterile dressing applied and sterile dressing with CHG applied  Assessment:  Blood return through all ports, free fluid flow and guidewire removal verified  Insertion:  Uncomplicated  Patient tolerance:  Patient tolerated the procedure well with no immediate complications

## 2023-02-06 NOTE — OP NOTES
Date of service: 2/6/2023  Preoperative diagnosis: Coronary artery disease, severe left mainstem coronary artery stenosis, severe right coronary artery stenosis, hypertension, diabetes,   Postoperative diagnosis: Same  Procedure:   Urgent coronary artery bypass graft x3. Left internal mammary artery to left anterior descending artery, saphenous vein graft to the obtuse marginal branch, saphenous vein graft to posterior descending branch of the right coronary artery. Endoscopic harvesting of greater saphenous vein, right      Anesthesia: General endotracheal  Anesthesiologist: Dr. Gris Sanchez  Intraprocedural complications: None  Estimated blood loss: 250 mL    Surgeon: Theresa Dhaliwal M.D., FACS  Assistant: PARAG Ann, AGUSTÍN Rubalcava PA-C assisted with endoscopic harvesting of the greater saphenous vein, cannulation establishment of cardiopulmonary bypass, all distal and all proximal anastomosis, decannulation and closure. He was present during the entirety of the procedure due to the complexity of the case. Description:  Patient is taken the operating, placed spine position on the operating table. General endotracheal anesthesia was induced without incidence. After placement of adequate monitoring lines and catheters, patient's anterior neck anterior chest anterior abdomen bilateral groins bilateral lower extremities were prepped and draped in the usual sterile fashion. Adequate timeout procedures were performed. A median sternotomy skin incision was made using #10 blade incision was carried down to the underlying sternum use electrocautery. Sternum was divided midline has been sternal saw. Left internal mammary artery was harvested from the overlying chest wall using hemoclips and electrocautery in the usual fashion.   At this time another surgical team harvested 2 segments of greater saphenous veins from the right lower extremity using endoscopic vein harvest technique. Patient is a heparinized using full dose intravenous heparin. Pericardium was exposed and opened. Ascending aorta and right atrium was cannulated in the usual fashion. Patient was then placed on cardiopulmonary bypass and heart was decompressed. I first exposed the distal right coronary artery. This was a heavily calcified vessel. Posterior descending branch was identified. It was adequately immobilized. Small arteriotomy was made. 1.25 mm shunt inserted. Greater saphenous vein was then brought into the field and anastomosed to the posterior descending arteriotomy using 7-0 Prolene in running fashion. I then exposed the obtuse marginal branch. This was a heavily calcified vessel. Relatively soft area was identified. He was immobilized. Small arteriotomy was made. 1.25 mm shunt was inserted. Saphenous vein graft was anastomosed to the obtuse marginal arteriotomy using 7-0 Prolene in running fashion. I then exposed the left anterior descending artery. Anastomosis constructed in the mid third. He was adequately immobilized. Small arteriotomy was made. 1.25 mm shunt was inserted. End of the left internal mammary artery was then brought into the field anastomosed to the LAD arteriotomy using 7-0 Prolene in running fashion. I then placed a side-biting clamp on the ascending aorta and 2 punch holes are made using #11 blade and 4.0 mm punch. Proximal ends of the saphenous vein grafts were anastomosed to the ascending aortotomies using 6-0 Prolene in running fashion. All distal and proximal anastomosis were checked for hemostasis all found to be nice hemostatic. Patient was then weaned off the cardiopulmonary bypass on no inotropic support with excellent hemodynamics and normal sinus rhythm. Intraoperative transesophageal echocardiogram showed well-preserved left ventricular function. Grafts were checked for flow using a hand-held Doppler probe.   There was triphasic flow with loud diastolic augmentation. Patient was then decannulated in the usual fashion and protamine was administered. Meticulous hemostasis was achieved. I placed four 24 F [place]el chest tubes, one in each pleural cavity, one posteriorly behind the heart and one anterior to the heart. I applied vancomycin paste to the sternal bone marrow twice during the procedure, the first 1 being in the beginning right after the sternotomy, the second 1 just before closure. Sternum was approximated using her sternal wires x7. Suprasternal fascia and rectus abdominis fascia was approximated using 0 Vicryl in running fashion. Subcutaneous tissues were approximated using 0 Vicryl in running fashion. Skin is approximated using 4-0 Monocryl. Dressings were applied. Patient tolerated the procedure very well was taken into the intensive care unit in stable intubated condition. Sponge needle instrument counts were correct. There were no complications.

## 2023-02-06 NOTE — PROGRESS NOTES
SBAR report taken from off going nurse [address] [facility] by Robert Barroso RN on coming nurse. Two man skin assessment performed with Javier Sood. No new wounds or skin issues identified. The pt remained comfortable and stable throughout shift today. She was able to ambulate in her room safely. 1020- Pt was covid swabbed for her procedure tomorrow. 451 Chelsie Renae report called to Kiera. 65- The pt was educated on the ICU visitor policy and room restrictions on fresh fruit and flowers. 1123- covid test resulted negative. 1430 The pt was transferred to ICU prior to her procedure.

## 2023-02-06 NOTE — ANESTHESIA PREPROCEDURE EVALUATION
Relevant Problems   No relevant active problems       Anesthetic History     PONV          Review of Systems / Medical History  Patient summary reviewed and pertinent labs reviewed    Pulmonary          Shortness of breath         Neuro/Psych         Psychiatric history     Cardiovascular    Hypertension  Valvular problems/murmurs: tricuspid insufficiency        Past MI, CAD and hyperlipidemia    Exercise tolerance: <4 METS  Comments: EF 50%   GI/Hepatic/Renal           Liver disease     Endo/Other    Diabetes: type 2  Hypothyroidism  Arthritis     Other Findings              Physical Exam    Airway  Mallampati: III  TM Distance: > 6 cm  Neck ROM: normal range of motion   Mouth opening: Normal     Cardiovascular          Murmur: Grade 2, Tricuspid area     Dental    Dentition: Poor dentition     Pulmonary  Breath sounds clear to auscultation               Abdominal  GI exam deferred       Other Findings            Anesthetic Plan    ASA: 4  Anesthesia type: general    Monitoring Plan: Arterial line, BIS, Continuous noninvasive hemodynamic monitoring, CVP, [place] and MARV    Post procedure ventilation     Anesthetic plan and risks discussed with: Patient

## 2023-02-06 NOTE — PROGRESS NOTES
CVT PRE-PROCEDURE CHECKLIST  PLEASE VERIFY THAT PATIENT HAS AN INPATIENT ADMISSION ORDER    1. Verify Allergies: Allergies   Allergen Reactions    Compazine [Prochlorperazine Edisylate] Angioedema    Aspirin Hives       2. Procedure consent signed by patient and physician and witnessed by nurse:  NO, to be signed by physician    3. Anesthesia consent signed by patient and anesthesiologist and witnessed by nurse: No  To be signed by anesthesiologist  4. Blood consent signed: Yes  A. Type and cross match done (date): 02/04/2023  B. Blood band number(place on left wrist):[id number]  C. Number of units available:yes, 1 unit of platelets, and 2 units PRBC - confirmed with Dorene Almaguer in lab  D. Blood transfusion history faxed to blood bank: Not applicable    5.  2 sheets of patient labels on chart: Yes    6. Patient ID bracelet and allergy bracelet on patient(place on left wrist) and readable: Yes    7. NPO since midnight: 2100PM    8. Vital signs obtained within 1 hour of procedure (for CABG's include both arms): Yes    9. Clipped per orders at:0500    10. Bathed with CHG-Chlorhexidine (the night before and the morning of- DOCUMENT) at: 02/05/23 at 2100, and    11. All jewelry, glasses, contact lenses, dentures, hearing aides removed: Yes    12. IV access: #1LFA #2 RFA-must have 2 IV sites at least one must be an #18G    13. DP/ PT pulses marked bilaterally: Yes    14. Anticoagulation stopped 4 hours prior to OR start time: date NA time NA    15. Last beta blocker dose (within 12 hours of OR start) : date02/06/2023; time 8922    16. Blood Glucose within 60 mins of OR start time (diabetics): 140    17. UA within 30 days: NO    18. Pre-op antibiotic ordered and at bedside to hand to OR staff (do not start) Yes    19. Rapid COVID test ordered and sent- put in per protocol if not already done within last 96 hours: Done 02/05/2023    20. NO IV's in Not applicable arm if radial harvest is expected.       Questions-call [phone] for the on call provider

## 2023-02-06 NOTE — PROGRESS NOTES
Problem: Falls - Risk of  Goal: *Absence of Falls  Description: Document Barbara Kahn Fall Risk and appropriate interventions in the flowsheet. Outcome: Progressing Towards Goal  Note: Fall Risk Interventions:            Medication Interventions: Evaluate medications/consider consulting pharmacy, Teach patient to arise slowly                   Problem: Patient Education: Go to Patient Education Activity  Goal: Patient/Family Education  Outcome: Progressing Towards Goal     Problem: Diabetes Self-Management  Goal: *Disease process and treatment process  Description: Define diabetes and identify own type of diabetes; list 3 options for treating diabetes. Outcome: Progressing Towards Goal  Goal: *Incorporating nutritional management into lifestyle  Description: Describe effect of type, amount and timing of food on blood glucose; list 3 methods for planning meals. Outcome: Progressing Towards Goal  Goal: *Incorporating physical activity into lifestyle  Description: State effect of exercise on blood glucose levels. Outcome: Progressing Towards Goal  Goal: *Developing strategies to promote health/change behavior  Description: Define the ABC's of diabetes; identify appropriate screenings, schedule and personal plan for screenings. Outcome: Progressing Towards Goal  Goal: *Using medications safely  Description: State effect of diabetes medications on diabetes; name diabetes medication taking, action and side effects. Outcome: Progressing Towards Goal  Goal: *Monitoring blood glucose, interpreting and using results  Description: Identify recommended blood glucose targets  and personal targets. Outcome: Progressing Towards Goal  Goal: *Prevention, detection, treatment of acute complications  Description: List symptoms of hyper- and hypoglycemia; describe how to treat low blood sugar and actions for lowering  high blood glucose level.   Outcome: Progressing Towards Goal  Goal: *Developing strategies to address psychosocial issues  Description: Describe feelings about living with diabetes; identify support needed and support network  Outcome: Progressing Towards Goal     Problem: Patient Education: Go to Patient Education Activity  Goal: Patient/Family Education  Outcome: Progressing Towards Goal     Problem: CABG: Pre-Op Day  Goal: Activity/Safety  Outcome: Progressing Towards Goal  Goal: Diagnostic Test/Procedures  Outcome: Progressing Towards Goal  Goal: Nutrition/Diet  Outcome: Progressing Towards Goal  Goal: Medications  Outcome: Progressing Towards Goal  Goal: *Hemodynamically stable  Outcome: Progressing Towards Goal  Goal: *Consent obtained, permits and diagnostics complete  Outcome: Progressing Towards Goal

## 2023-02-06 NOTE — PROGRESS NOTES
TRANSFER - IN REPORT:    Verbal report received from Fito Menezes RN on Ursula Sparks  being received from CVT ICU for ordered procedure      Report consisted of patients Situation, Background, Assessment and   Recommendations(SBAR). Information from the following report(s) SBAR, Intake/Output, MAR, and Pre Procedure Checklist was reviewed with the receiving nurse. Opportunity for questions and clarification was provided. Assessment completed upon patients arrival to unit and care assumed.

## 2023-02-06 NOTE — PROGRESS NOTES
07:15  Received pt supine in bed from [employer], RN. Pt A&O x 4. Denies pain. Pt does not appear to be anxious. Episcopal music playing. HRR with murmur. Rhythm in sinus at 62 bpm.  Lungs CTA. Sp02 96% on r/a. Active bowel sounds  easily palpable pulses x 4 extremities. Removed dressing to right radial site (heart cath access site). Site with ecchymosis but no swelling. Site is soft with palpable pulse. Pt with elevated BP - Dr. Tano Bull notified. As pt is going to surgery, will treat BP in OR. IVF: NS at 100 ml/hr. Wound Prevention Checklist    Patient: Rosa Aguilar (6 y.o. female)  Date: 2/6/2023  Diagnosis: Chest pain, unspecified type [R07.9]  CAD (coronary artery disease), native coronary artery [I25.10] <principal problem not specified>    Precautions:         []  Heel prevention boots placed on patient    []  Patient turned q2h during shift    []  Lift team ordered    [x]  Patient on [place] bed/Specialty bed    []  Each Wound is documented during shift (Stage, Color, drainage, odor, measurements, and dressings)    [x]  Dual skin check done with ERNESTO Herrera RN     07:19  Pt off floor to surgery. 13:14  Received pt from OR to rm 2354. Pt with 4 chest tubes to 2 atriums attached to constant -20cm wall suction. No air leak noted. Sternal dressing with scant blood. ART line to left radial artery leveled and zeroed. 14:05  EKG obtained. 14:25  PCXR obtained  14:50  Family brought in to see pt. DaughterOrlin will be  [phone]  15:00  Propofol discontinued. 16:34  Placed on SBT with pressure suppor of 12, PEEP 5. Pt opens eyes spontaneously. Moves extremities on command. Answers yes/no questions appropriately.    16:52  Pressure support reduced to 8 as pt appears to be tolerating SBT with RR in 20s.    17:15  Pressure support increased from 8 to 12 r/t increased RR to 50s.  17:22  Pt RR 46-52 on spontaneous with pressure support of 12.  RT called to cease spontaneous trial.  17:25  RT at bedside. Pt RR decreased to 24 without vent adjustment. Pt remains on SBT with pressure support of 12, PEEP 5, Fi02 40%. 18:10  Phoned Dr. Bala Russell and relayed ABG results and lactic acid of 2.86. Relayed pt's ventilator requirements on SBT with pressure support at 12 and PEEP of 5. Pt with labile respiratory pattern with respiration rate as high as 50 and then will subside to 20s. Orders received for 2 amps of sodium bicarbonate and repeat gas in 30 minutes. 18:45  Relayed repeat ABG to Dr. Bala Russell. Pt not ready for extubation. Will continue to assess ventilatory status for appropriate extubation. Bedside and Verbal shift change report given to Min Arreaga RN (oncoming nurse) by Annika Moseley RN (offgoing nurse). Report included the following information SBAR, Kardex, OR Summary, Procedure Summary, Intake/Output, MAR, Recent Results, Med Rec Status, and Cardiac Rhythm Sinus rhythm with pericardial friction rub and murmur.  .    19:30  Updated daughter, Terence Acosta [phone]

## 2023-02-06 NOTE — PROGRESS NOTES
TRANSFER - OUT REPORT:    Verbal report given to Fatmata Russell RN on Livier Pages  being transferred to CVT ICU for routine post - op       Report consisted of patients Situation, Background, Assessment and   Recommendations(SBAR). Information from the following report(s) SBAR, Procedure Summary, Intake/Output, and MAR was reviewed with the receiving nurse. Lines:   Single Lumen Venous Catheter 02/06/23 (Active)       Triple Lumen 02/06/23 Right Internal jugular (Active)       Peripheral IV 02/03/23 Posterior;Right Forearm (Active)   Site Assessment Clean, dry, & intact 02/06/23 0929   Phlebitis Assessment 0 02/06/23 0929   Infiltration Assessment 0 02/06/23 0400   Dressing Status Clean, dry, & intact 02/06/23 0929   Dressing Type Transparent;Tape 02/06/23 0929   Hub Color/Line Status Pink 02/06/23 0929   Action Taken Open ports on tubing capped 02/06/23 0400   Alcohol Cap Used Yes 02/06/23 0400       Peripheral IV 02/05/23 Anterior;Distal;Left Forearm (Active)   Site Assessment Clean, dry, & intact 02/06/23 0929   Phlebitis Assessment 0 02/06/23 0929   Infiltration Assessment 0 02/06/23 0400   Dressing Status Clean, dry, & intact 02/06/23 0929   Dressing Type Transparent;Tape 02/06/23 0929   Hub Color/Line Status Pink 02/06/23 0929   Action Taken Open ports on tubing capped 02/06/23 0400   Alcohol Cap Used Yes 02/06/23 0400       Arterial Line 02/06/23 (Active)       Arterial Line 02/06/23 (Active)        Opportunity for questions and clarification was provided.       Patient transported with:   Monitor  Registered Nurse

## 2023-02-06 NOTE — PROGRESS NOTES
1900: Bedside shift change report given to this nurse (oncoming nurse) by ANSHUL Nair (offgoing nurse). Report included the following information SBAR, Kardex, and Cardiac Rhythm NSR . Wound Prevention Checklist    Patient: Amanda Cavazos (88 y.o. female)  Date: 2/5/2023  Diagnosis: Chest pain, unspecified type [R07.9]  CAD (coronary artery disease), native coronary artery [I25.10] <principal problem not specified>    Precautions:         []  Heel prevention boots placed on patient    [x]  Patient turned q2h during shift    []  Lift team ordered    [x]  Patient on [place] bed/Specialty bed    [x]  Each Wound is documented during shift (Stage, Color, drainage, odor, measurements, and dressings)    [x]  Dual skin check done with Tyson Matos RN  Patient resting in bed with daughter at bedside. NSR on  monitor. Denies any pain or discomfort at this time. No distress noted. 2100: CHG bath given (1 of 2). 2331: PRN hydralazine given per order for sbp above 150.  0000: Patient NPO at midnight per order. 0021: b/p rechecked,   0500: CHG bath (2 of 2) given. Body hair to chest, extremities x4, and bilateral groin areas clipped appropriately. 0700: Report given to ERNESTO Weinstein

## 2023-02-06 NOTE — PROGRESS NOTES
13:14  Received pt from OR to rm 2357. Pt with 4 chest tubes to 2 atriums attached to constant -20cm wall suction. No air leak noted. Sternal dressing with scant blood. ART line to left radial artery leveled and zeroed. 14:05  EKG obtained. 14:25  PCXR obtained  14:50  Family brought in to see pt. Daughter, Abby Arce will be  [phone]  15:00  Propofol discontinued. 16:34  Placed on SBT with pressure suppor of 12, PEEP 5. Pt opens eyes spontaneously. Moves extremities on command. Answers yes/no questions appropriately. 16:52  Pressure support reduced to 8 as pt appears to be tolerating SBT with RR in 20s.    17:15  Pressure support increased from 8 to 12 r/t increased RR to 50s.  17:22  Pt RR 46-52 on spontaneous with pressure support of 12.  RT called to cease spontaneous trial.  17:25  RT at bedside. Pt RR decreased to 24 without vent adjustment. Pt remains on SBT with pressure support of 12, PEEP 5, Fi02 40%. 18:10  Phoned Dr. Liu Jolly and relayed ABG results and lactic acid of 2.86. Relayed pt's ventilator requirements on SBT with pressure support at 12 and PEEP of 5. Pt with labile respiratory pattern with respiration rate as high as 50 and then will subside to 20s. Orders received for 2 amps of sodium bicarbonate and repeat gas in 30 minutes. 18:45  Relayed repeat ABG to Dr. Liu Jolly. Pt not ready for extubation. Will continue to assess ventilatory status for appropriate extubation.    19:30  Updated daughter, Abby Arce [phone]

## 2023-02-06 NOTE — PROGRESS NOTES
Problem: Falls - Risk of  Goal: *Absence of Falls  Description: Document Kelsey Trotter Fall Risk and appropriate interventions in the flowsheet. Outcome: Progressing Towards Goal  Note: Fall Risk Interventions:            Medication Interventions: Evaluate medications/consider consulting pharmacy, Teach patient to arise slowly                   Problem: Diabetes Self-Management  Goal: *Disease process and treatment process  Description: Define diabetes and identify own type of diabetes; list 3 options for treating diabetes. Outcome: Progressing Towards Goal  Goal: *Incorporating nutritional management into lifestyle  Description: Describe effect of type, amount and timing of food on blood glucose; list 3 methods for planning meals. Outcome: Progressing Towards Goal  Goal: *Incorporating physical activity into lifestyle  Description: State effect of exercise on blood glucose levels. Outcome: Progressing Towards Goal  Goal: *Developing strategies to promote health/change behavior  Description: Define the ABC's of diabetes; identify appropriate screenings, schedule and personal plan for screenings. Outcome: Progressing Towards Goal  Goal: *Using medications safely  Description: State effect of diabetes medications on diabetes; name diabetes medication taking, action and side effects. Outcome: Progressing Towards Goal  Goal: *Monitoring blood glucose, interpreting and using results  Description: Identify recommended blood glucose targets  and personal targets. Outcome: Progressing Towards Goal  Goal: *Prevention, detection, treatment of acute complications  Description: List symptoms of hyper- and hypoglycemia; describe how to treat low blood sugar and actions for lowering  high blood glucose level.   Outcome: Progressing Towards Goal  Goal: *Prevention, detection and treatment of chronic complications  Description: Define the natural course of diabetes and describe the relationship of blood glucose levels to long term complications of diabetes.   Outcome: Progressing Towards Goal

## 2023-02-07 ENCOUNTER — APPOINTMENT (OUTPATIENT)
Dept: GENERAL RADIOLOGY | Age: 68
DRG: 948 | End: 2023-02-07
Attending: PHYSICIAN ASSISTANT
Payer: MEDICARE

## 2023-02-07 LAB
ADMINISTERED INITIALS, ADMINIT: NORMAL
ANION GAP BLD CALC-SCNC: ABNORMAL (ref 10–20)
ANION GAP BLD CALC-SCNC: ABNORMAL (ref 10–20)
ANION GAP SERPL CALC-SCNC: 4 MMOL/L (ref 3–18)
ANION GAP SERPL CALC-SCNC: 6 MMOL/L (ref 3–18)
ATRIAL RATE: 73 BPM
BASE DEFICIT BLD-SCNC: 0.7 MMOL/L
BASE DEFICIT BLD-SCNC: 1 MMOL/L
BASE DEFICIT BLD-SCNC: 1.4 MMOL/L
BASE DEFICIT BLD-SCNC: 1.5 MMOL/L
BASOPHILS # BLD: 0 K/UL (ref 0–0.1)
BASOPHILS NFR BLD: 0 % (ref 0–2)
BLD PROD TYP BPU: NORMAL
BPU ID: NORMAL
BUN SERPL-MCNC: 13 MG/DL (ref 7–18)
BUN SERPL-MCNC: 15 MG/DL (ref 7–18)
BUN/CREAT SERPL: 18 (ref 12–20)
BUN/CREAT SERPL: 22 (ref 12–20)
CA-I BLD-MCNC: 1.2 MMOL/L (ref 1.12–1.32)
CA-I BLD-MCNC: 1.23 MMOL/L (ref 1.12–1.32)
CALCIUM SERPL-MCNC: 7.9 MG/DL (ref 8.5–10.1)
CALCIUM SERPL-MCNC: 8 MG/DL (ref 8.5–10.1)
CALCULATED P AXIS, ECG09: 13 DEGREES
CALCULATED R AXIS, ECG10: -8 DEGREES
CALCULATED T AXIS, ECG11: 15 DEGREES
CHLORIDE BLD-SCNC: 110 MMOL/L (ref 98–107)
CHLORIDE BLD-SCNC: 111 MMOL/L (ref 98–107)
CHLORIDE SERPL-SCNC: 112 MMOL/L (ref 100–111)
CHLORIDE SERPL-SCNC: 117 MMOL/L (ref 100–111)
CO2 BLD-SCNC: 24 MMOL/L (ref 19–24)
CO2 BLD-SCNC: 24 MMOL/L (ref 19–24)
CO2 SERPL-SCNC: 24 MMOL/L (ref 21–32)
CO2 SERPL-SCNC: 25 MMOL/L (ref 21–32)
CREAT BLD-MCNC: 0.79 MG/DL (ref 0.6–1.3)
CREAT BLD-MCNC: 0.85 MG/DL (ref 0.6–1.3)
CREAT SERPL-MCNC: 0.68 MG/DL (ref 0.6–1.3)
CREAT SERPL-MCNC: 0.74 MG/DL (ref 0.6–1.3)
D50 ADMINISTERED, D50ADM: 0 ML
D50 ORDER, D50ORD: 0 ML
DIAGNOSIS, 93000: NORMAL
DIFFERENTIAL METHOD BLD: ABNORMAL
EOSINOPHIL # BLD: 0 K/UL (ref 0–0.4)
EOSINOPHIL NFR BLD: 0 % (ref 0–5)
ERYTHROCYTE [DISTWIDTH] IN BLOOD BY AUTOMATED COUNT: 13.4 % (ref 11.6–14.5)
GLUCOSE BLD STRIP.AUTO-MCNC: 103 MG/DL (ref 70–110)
GLUCOSE BLD STRIP.AUTO-MCNC: 103 MG/DL (ref 70–110)
GLUCOSE BLD STRIP.AUTO-MCNC: 108 MG/DL (ref 70–110)
GLUCOSE BLD STRIP.AUTO-MCNC: 110 MG/DL (ref 70–110)
GLUCOSE BLD STRIP.AUTO-MCNC: 114 MG/DL (ref 70–110)
GLUCOSE BLD STRIP.AUTO-MCNC: 144 MG/DL (ref 70–110)
GLUCOSE BLD STRIP.AUTO-MCNC: 166 MG/DL (ref 70–110)
GLUCOSE BLD STRIP.AUTO-MCNC: 97 MG/DL (ref 70–110)
GLUCOSE BLD-MCNC: 112 MG/DL (ref 65–100)
GLUCOSE BLD-MCNC: 118 MG/DL (ref 65–100)
GLUCOSE SERPL-MCNC: 102 MG/DL (ref 74–99)
GLUCOSE SERPL-MCNC: 132 MG/DL (ref 74–99)
GLUCOSE, GLC: 103 MG/DL
GLUCOSE, GLC: 103 MG/DL
GLUCOSE, GLC: 108 MG/DL
GLUCOSE, GLC: 110 MG/DL
GLUCOSE, GLC: 114 MG/DL
GLUCOSE, GLC: 118 MG/DL
GLUCOSE, GLC: 97 MG/DL
HCO3 BLD-SCNC: 24.6 MMOL/L (ref 22–26)
HCO3 BLD-SCNC: 24.6 MMOL/L (ref 22–26)
HCO3 BLD-SCNC: 24.8 MMOL/L (ref 22–26)
HCO3 BLD-SCNC: 25.4 MMOL/L (ref 22–26)
HCT VFR BLD AUTO: 26.1 % (ref 35–45)
HGB BLD-MCNC: 8.6 G/DL (ref 12–16)
HIGH TARGET, HITG: 150 MG/DL
IMM GRANULOCYTES # BLD AUTO: 0 K/UL (ref 0–0.04)
IMM GRANULOCYTES NFR BLD AUTO: 0 % (ref 0–0.5)
INSULIN ADMINSTERED, INSADM: 1.9 UNITS/HOUR
INSULIN ADMINSTERED, INSADM: 2.2 UNITS/HOUR
INSULIN ADMINSTERED, INSADM: 2.2 UNITS/HOUR
INSULIN ADMINSTERED, INSADM: 2.4 UNITS/HOUR
INSULIN ADMINSTERED, INSADM: 2.5 UNITS/HOUR
INSULIN ADMINSTERED, INSADM: 2.7 UNITS/HOUR
INSULIN ADMINSTERED, INSADM: 2.9 UNITS/HOUR
INSULIN ORDER, INSORD: 1.9 UNITS/HOUR
INSULIN ORDER, INSORD: 2.2 UNITS/HOUR
INSULIN ORDER, INSORD: 2.2 UNITS/HOUR
INSULIN ORDER, INSORD: 2.4 UNITS/HOUR
INSULIN ORDER, INSORD: 2.5 UNITS/HOUR
INSULIN ORDER, INSORD: 2.7 UNITS/HOUR
INSULIN ORDER, INSORD: 2.9 UNITS/HOUR
LACTATE BLD-SCNC: 1.05 MMOL/L (ref 0.4–2)
LACTATE BLD-SCNC: 1.12 MMOL/L (ref 0.4–2)
LACTATE BLD-SCNC: 1.17 MMOL/L (ref 0.4–2)
LACTATE BLD-SCNC: 1.29 MMOL/L (ref 0.4–2)
LOW TARGET, LOT: 80 MG/DL
LYMPHOCYTES # BLD: 0.6 K/UL (ref 0.9–3.6)
LYMPHOCYTES NFR BLD: 6 % (ref 21–52)
MAGNESIUM SERPL-MCNC: 1.9 MG/DL (ref 1.6–2.6)
MCH RBC QN AUTO: 31.7 PG (ref 24–34)
MCHC RBC AUTO-ENTMCNC: 33 G/DL (ref 31–37)
MCV RBC AUTO: 96.3 FL (ref 78–100)
MINUTES UNTIL NEXT BG, NBG: 120 MIN
MONOCYTES # BLD: 0.9 K/UL (ref 0.05–1.2)
MONOCYTES NFR BLD: 8 % (ref 3–10)
MULTIPLIER, MUL: 0.05
NEUTS SEG # BLD: 9.1 K/UL (ref 1.8–8)
NEUTS SEG NFR BLD: 85 % (ref 40–73)
NRBC # BLD: 0 K/UL (ref 0–0.01)
NRBC BLD-RTO: 0 PER 100 WBC
ORDER INITIALS, ORDINIT: NORMAL
P-R INTERVAL, ECG05: 126 MS
PCO2 BLD: 45.6 MMHG (ref 35–45)
PCO2 BLD: 46.3 MMHG (ref 35–45)
PCO2 BLD: 46.7 MMHG (ref 35–45)
PCO2 BLD: 48.5 MMHG (ref 35–45)
PH BLD: 7.33 (ref 7.35–7.45)
PH BLD: 7.34 (ref 7.35–7.45)
PLATELET # BLD AUTO: 173 K/UL (ref 135–420)
PMV BLD AUTO: 11.1 FL (ref 9.2–11.8)
PO2 BLD: 103 MMHG (ref 80–100)
PO2 BLD: 111 MMHG (ref 80–100)
PO2 BLD: 98 MMHG (ref 80–100)
PO2 BLD: 99 MMHG (ref 80–100)
POTASSIUM BLD-SCNC: 3.9 MMOL/L (ref 3.5–5.1)
POTASSIUM BLD-SCNC: 4.1 MMOL/L (ref 3.5–5.1)
POTASSIUM SERPL-SCNC: 3.7 MMOL/L (ref 3.5–5.5)
POTASSIUM SERPL-SCNC: 4.2 MMOL/L (ref 3.5–5.5)
Q-T INTERVAL, ECG07: 408 MS
QRS DURATION, ECG06: 68 MS
QTC CALCULATION (BEZET), ECG08: 449 MS
RBC # BLD AUTO: 2.71 M/UL (ref 4.2–5.3)
SAO2 % BLD: 97 %
SAO2 % BLD: 97.1 % (ref 92–97)
SAO2 % BLD: 97.4 % (ref 92–97)
SAO2 % BLD: 98 %
SERVICE CMNT-IMP: ABNORMAL
SODIUM BLD-SCNC: 147 MMOL/L (ref 136–145)
SODIUM BLD-SCNC: 147 MMOL/L (ref 136–145)
SODIUM SERPL-SCNC: 142 MMOL/L (ref 136–145)
SODIUM SERPL-SCNC: 146 MMOL/L (ref 136–145)
SPECIMEN SITE: ABNORMAL
SPECIMEN SITE: ABNORMAL
SPECIMEN TYPE: ABNORMAL
SPECIMEN TYPE: ABNORMAL
STATUS OF UNIT,%ST: NORMAL
UNIT DIVISION, %UDIV: 0
VENTRICULAR RATE, ECG03: 73 BPM
WBC # BLD AUTO: 10.7 K/UL (ref 4.6–13.2)

## 2023-02-07 PROCEDURE — 94660 CPAP INITIATION&MGMT: CPT

## 2023-02-07 PROCEDURE — 94762 N-INVAS EAR/PLS OXIMTRY CONT: CPT

## 2023-02-07 PROCEDURE — 97162 PT EVAL MOD COMPLEX 30 MIN: CPT

## 2023-02-07 PROCEDURE — 82962 GLUCOSE BLOOD TEST: CPT

## 2023-02-07 PROCEDURE — 83605 ASSAY OF LACTIC ACID: CPT

## 2023-02-07 PROCEDURE — 97112 NEUROMUSCULAR REEDUCATION: CPT

## 2023-02-07 PROCEDURE — 74011000250 HC RX REV CODE- 250: Performed by: PHYSICIAN ASSISTANT

## 2023-02-07 PROCEDURE — 82947 ASSAY GLUCOSE BLOOD QUANT: CPT

## 2023-02-07 PROCEDURE — 93005 ELECTROCARDIOGRAM TRACING: CPT

## 2023-02-07 PROCEDURE — 97535 SELF CARE MNGMENT TRAINING: CPT

## 2023-02-07 PROCEDURE — 84132 ASSAY OF SERUM POTASSIUM: CPT

## 2023-02-07 PROCEDURE — 2709999900 HC NON-CHARGEABLE SUPPLY

## 2023-02-07 PROCEDURE — 74011250637 HC RX REV CODE- 250/637: Performed by: THORACIC SURGERY (CARDIOTHORACIC VASCULAR SURGERY)

## 2023-02-07 PROCEDURE — 74011250637 HC RX REV CODE- 250/637: Performed by: PHYSICIAN ASSISTANT

## 2023-02-07 PROCEDURE — 9990 CHARGE CONVERSION

## 2023-02-07 PROCEDURE — 80048 BASIC METABOLIC PNL TOTAL CA: CPT

## 2023-02-07 PROCEDURE — 74011636637 HC RX REV CODE- 636/637: Performed by: PHYSICIAN ASSISTANT

## 2023-02-07 PROCEDURE — 82330 ASSAY OF CALCIUM: CPT

## 2023-02-07 PROCEDURE — 74011250636 HC RX REV CODE- 250/636: Performed by: THORACIC SURGERY (CARDIOTHORACIC VASCULAR SURGERY)

## 2023-02-07 PROCEDURE — 97166 OT EVAL MOD COMPLEX 45 MIN: CPT

## 2023-02-07 PROCEDURE — 65270000029 HC RM PRIVATE

## 2023-02-07 PROCEDURE — 99024 POSTOP FOLLOW-UP VISIT: CPT | Performed by: PHYSICIAN ASSISTANT

## 2023-02-07 PROCEDURE — 85025 COMPLETE CBC W/AUTO DIFF WBC: CPT

## 2023-02-07 PROCEDURE — 71045 X-RAY EXAM CHEST 1 VIEW: CPT

## 2023-02-07 PROCEDURE — P9045 ALBUMIN (HUMAN), 5%, 250 ML: HCPCS | Performed by: PHYSICIAN ASSISTANT

## 2023-02-07 PROCEDURE — 83735 ASSAY OF MAGNESIUM: CPT

## 2023-02-07 PROCEDURE — P9045 ALBUMIN (HUMAN), 5%, 250 ML: HCPCS

## 2023-02-07 PROCEDURE — APPNB30 APP NON BILLABLE TIME 0-30 MINS: Performed by: PHYSICIAN ASSISTANT

## 2023-02-07 PROCEDURE — 74011250636 HC RX REV CODE- 250/636: Performed by: PHYSICIAN ASSISTANT

## 2023-02-07 PROCEDURE — 99232 SBSQ HOSP IP/OBS MODERATE 35: CPT | Performed by: INTERNAL MEDICINE

## 2023-02-07 PROCEDURE — 84295 ASSAY OF SERUM SODIUM: CPT

## 2023-02-07 PROCEDURE — 82803 BLOOD GASES ANY COMBINATION: CPT

## 2023-02-07 PROCEDURE — 77010033678 HC OXYGEN DAILY

## 2023-02-07 PROCEDURE — 65620000000 HC RM CCU GENERAL

## 2023-02-07 RX ORDER — ACETAMINOPHEN 500 MG
1000 TABLET ORAL EVERY 6 HOURS
Status: DISCONTINUED | OUTPATIENT
Start: 2023-02-07 | End: 2023-02-11 | Stop reason: HOSPADM

## 2023-02-07 RX ORDER — CYANOCOBALAMIN 1000 UG/ML
1000 INJECTION, SOLUTION INTRAMUSCULAR; SUBCUTANEOUS ONCE
Status: COMPLETED | OUTPATIENT
Start: 2023-02-07 | End: 2023-02-07

## 2023-02-07 RX ORDER — POTASSIUM CHLORIDE 750 MG/1
10 TABLET, EXTENDED RELEASE ORAL ONCE
Status: COMPLETED | OUTPATIENT
Start: 2023-02-07 | End: 2023-02-07

## 2023-02-07 RX ORDER — INSULIN GLARGINE 100 [IU]/ML
25 INJECTION, SOLUTION SUBCUTANEOUS DAILY
Status: DISCONTINUED | OUTPATIENT
Start: 2023-02-07 | End: 2023-02-11 | Stop reason: HOSPADM

## 2023-02-07 RX ORDER — ONDANSETRON 2 MG/ML
4 INJECTION INTRAMUSCULAR; INTRAVENOUS ONCE
Status: COMPLETED | OUTPATIENT
Start: 2023-02-07 | End: 2023-02-07

## 2023-02-07 RX ORDER — POTASSIUM CHLORIDE 20 MEQ/1
20 TABLET, EXTENDED RELEASE ORAL ONCE
Status: COMPLETED | OUTPATIENT
Start: 2023-02-07 | End: 2023-02-07

## 2023-02-07 RX ORDER — DEXTROSE MONOHYDRATE 100 MG/ML
0-250 INJECTION, SOLUTION INTRAVENOUS AS NEEDED
Status: DISCONTINUED | OUTPATIENT
Start: 2023-02-07 | End: 2023-02-11 | Stop reason: HOSPADM

## 2023-02-07 RX ORDER — IBUPROFEN 200 MG
4 TABLET ORAL AS NEEDED
Status: DISCONTINUED | OUTPATIENT
Start: 2023-02-07 | End: 2023-02-11 | Stop reason: HOSPADM

## 2023-02-07 RX ORDER — ALBUMIN HUMAN 50 G/1000ML
12.5 SOLUTION INTRAVENOUS ONCE
Status: COMPLETED | OUTPATIENT
Start: 2023-02-07 | End: 2023-02-07

## 2023-02-07 RX ORDER — ALBUMIN HUMAN 50 G/1000ML
SOLUTION INTRAVENOUS
Status: DISCONTINUED
Start: 2023-02-07 | End: 2023-02-07

## 2023-02-07 RX ORDER — DOPAMINE HYDROCHLORIDE 160 MG/100ML
0-20 INJECTION, SOLUTION INTRAVENOUS CONTINUOUS
Status: DISCONTINUED | OUTPATIENT
Start: 2023-02-07 | End: 2023-02-09

## 2023-02-07 RX ORDER — ALBUMIN HUMAN 50 G/1000ML
25 SOLUTION INTRAVENOUS ONCE
Status: DISCONTINUED | OUTPATIENT
Start: 2023-02-07 | End: 2023-02-07

## 2023-02-07 RX ORDER — FUROSEMIDE 10 MG/ML
20 INJECTION INTRAMUSCULAR; INTRAVENOUS ONCE
Status: COMPLETED | OUTPATIENT
Start: 2023-02-07 | End: 2023-02-07

## 2023-02-07 RX ORDER — MIDODRINE HYDROCHLORIDE 5 MG/1
10 TABLET ORAL
Status: DISCONTINUED | OUTPATIENT
Start: 2023-02-07 | End: 2023-02-11 | Stop reason: HOSPADM

## 2023-02-07 RX ORDER — TRAMADOL HYDROCHLORIDE 50 MG/1
50-100 TABLET ORAL
Status: DISCONTINUED | OUTPATIENT
Start: 2023-02-07 | End: 2023-02-11 | Stop reason: HOSPADM

## 2023-02-07 RX ORDER — MIDODRINE HYDROCHLORIDE 5 MG/1
10 TABLET ORAL
Status: DISCONTINUED | OUTPATIENT
Start: 2023-02-07 | End: 2023-02-07 | Stop reason: SDUPTHER

## 2023-02-07 RX ORDER — INSULIN LISPRO 100 [IU]/ML
INJECTION, SOLUTION INTRAVENOUS; SUBCUTANEOUS
Status: DISCONTINUED | OUTPATIENT
Start: 2023-02-07 | End: 2023-02-11 | Stop reason: HOSPADM

## 2023-02-07 RX ORDER — POTASSIUM CHLORIDE 14.9 MG/ML
20 INJECTION INTRAVENOUS
Status: COMPLETED | OUTPATIENT
Start: 2023-02-07 | End: 2023-02-07

## 2023-02-07 RX ORDER — METOCLOPRAMIDE HYDROCHLORIDE 5 MG/ML
10 INJECTION INTRAMUSCULAR; INTRAVENOUS EVERY 6 HOURS
Status: COMPLETED | OUTPATIENT
Start: 2023-02-07 | End: 2023-02-08

## 2023-02-07 RX ORDER — THERA TABS 400 MCG
1 TAB ORAL DAILY
Status: DISCONTINUED | OUTPATIENT
Start: 2023-02-07 | End: 2023-02-11 | Stop reason: HOSPADM

## 2023-02-07 RX ORDER — ALBUMIN HUMAN 50 G/1000ML
12.5 SOLUTION INTRAVENOUS ONCE
Status: DISCONTINUED | OUTPATIENT
Start: 2023-02-07 | End: 2023-02-07

## 2023-02-07 RX ADMIN — MUPIROCIN: 20 OINTMENT TOPICAL at 17:25

## 2023-02-07 RX ADMIN — CYANOCOBALAMIN 1000 MCG: 1000 INJECTION, SOLUTION INTRAMUSCULAR; SUBCUTANEOUS at 17:24

## 2023-02-07 RX ADMIN — Medication 2 UNITS: at 22:06

## 2023-02-07 RX ADMIN — METOCLOPRAMIDE HYDROCHLORIDE 10 MG: 5 INJECTION INTRAMUSCULAR; INTRAVENOUS at 23:48

## 2023-02-07 RX ADMIN — LEVOTHYROXINE SODIUM 100 MCG: 100 TABLET ORAL at 09:35

## 2023-02-07 RX ADMIN — FAMOTIDINE 20 MG: 20 TABLET, FILM COATED ORAL at 09:35

## 2023-02-07 RX ADMIN — SODIUM CHLORIDE, PRESERVATIVE FREE 10 ML: 5 INJECTION INTRAVENOUS at 15:33

## 2023-02-07 RX ADMIN — METOCLOPRAMIDE HYDROCHLORIDE 10 MG: 5 INJECTION INTRAMUSCULAR; INTRAVENOUS at 17:33

## 2023-02-07 RX ADMIN — HYDROCODONE BITARTRATE AND ACETAMINOPHEN 1 TABLET: 5; 325 TABLET ORAL at 02:17

## 2023-02-07 RX ADMIN — SODIUM CHLORIDE, PRESERVATIVE FREE 10 ML: 5 INJECTION INTRAVENOUS at 22:08

## 2023-02-07 RX ADMIN — FUROSEMIDE 20 MG: 10 INJECTION, SOLUTION INTRAMUSCULAR; INTRAVENOUS at 14:31

## 2023-02-07 RX ADMIN — TRAMADOL HYDROCHLORIDE 100 MG: 50 TABLET, COATED ORAL at 13:29

## 2023-02-07 RX ADMIN — ASPIRIN 81 MG: 81 TABLET, COATED ORAL at 09:35

## 2023-02-07 RX ADMIN — POLYETHYLENE GLYCOL 3350 17 G: 17 POWDER, FOR SOLUTION ORAL at 09:34

## 2023-02-07 RX ADMIN — MUPIROCIN: 20 OINTMENT TOPICAL at 09:44

## 2023-02-07 RX ADMIN — AMIODARONE HYDROCHLORIDE 200 MG: 200 TABLET ORAL at 22:03

## 2023-02-07 RX ADMIN — ATORVASTATIN CALCIUM 20 MG: 20 TABLET, FILM COATED ORAL at 17:25

## 2023-02-07 RX ADMIN — ACETAMINOPHEN 1000 MG: 500 TABLET ORAL at 17:25

## 2023-02-07 RX ADMIN — ACETAMINOPHEN 1000 MG: 500 TABLET ORAL at 23:48

## 2023-02-07 RX ADMIN — MORPHINE SULFATE 4 MG: 2 INJECTION, SOLUTION INTRAMUSCULAR; INTRAVENOUS at 00:38

## 2023-02-07 RX ADMIN — POTASSIUM CHLORIDE 20 MEQ: 1500 TABLET, EXTENDED RELEASE ORAL at 15:32

## 2023-02-07 RX ADMIN — SODIUM CHLORIDE, PRESERVATIVE FREE 10 ML: 5 INJECTION INTRAVENOUS at 06:58

## 2023-02-07 RX ADMIN — POTASSIUM CHLORIDE 20 MEQ: 14.9 INJECTION, SOLUTION INTRAVENOUS at 01:21

## 2023-02-07 RX ADMIN — MORPHINE SULFATE 2 MG: 2 INJECTION, SOLUTION INTRAMUSCULAR; INTRAVENOUS at 19:30

## 2023-02-07 RX ADMIN — CEFAZOLIN 2 G: 1 INJECTION, POWDER, FOR SOLUTION INTRAMUSCULAR; INTRAVENOUS at 03:42

## 2023-02-07 RX ADMIN — ALBUMIN (HUMAN) 12.5 G: 12.5 INJECTION, SOLUTION INTRAVENOUS at 06:01

## 2023-02-07 RX ADMIN — MIDODRINE HYDROCHLORIDE 10 MG: 5 TABLET ORAL at 12:16

## 2023-02-07 RX ADMIN — MORPHINE SULFATE 2 MG: 2 INJECTION, SOLUTION INTRAMUSCULAR; INTRAVENOUS at 12:16

## 2023-02-07 RX ADMIN — IRON SUCROSE 200 MG: 20 INJECTION, SOLUTION INTRAVENOUS at 14:32

## 2023-02-07 RX ADMIN — CEFAZOLIN 2 G: 1 INJECTION, POWDER, FOR SOLUTION INTRAMUSCULAR; INTRAVENOUS at 12:16

## 2023-02-07 RX ADMIN — FAMOTIDINE 20 MG: 20 TABLET, FILM COATED ORAL at 17:25

## 2023-02-07 RX ADMIN — CHLORHEXIDINE GLUCONATE 0.12% ORAL RINSE 10 ML: 1.2 LIQUID ORAL at 09:34

## 2023-02-07 RX ADMIN — CHLORHEXIDINE GLUCONATE 0.12% ORAL RINSE 10 ML: 1.2 LIQUID ORAL at 17:25

## 2023-02-07 RX ADMIN — HEPARIN SODIUM 5000 UNITS: 5000 INJECTION INTRAVENOUS; SUBCUTANEOUS at 22:06

## 2023-02-07 RX ADMIN — ACETAMINOPHEN 1000 MG: 500 TABLET ORAL at 13:24

## 2023-02-07 RX ADMIN — AMIODARONE HYDROCHLORIDE 200 MG: 200 TABLET ORAL at 16:00

## 2023-02-07 RX ADMIN — THERA TABS 1 TABLET: TAB at 14:32

## 2023-02-07 RX ADMIN — MORPHINE SULFATE 2 MG: 2 INJECTION, SOLUTION INTRAMUSCULAR; INTRAVENOUS at 23:48

## 2023-02-07 RX ADMIN — HEPARIN SODIUM 5000 UNITS: 5000 INJECTION INTRAVENOUS; SUBCUTANEOUS at 14:32

## 2023-02-07 RX ADMIN — MIDODRINE HYDROCHLORIDE 10 MG: 5 TABLET ORAL at 07:24

## 2023-02-07 RX ADMIN — POTASSIUM CHLORIDE 10 MEQ: 750 TABLET, EXTENDED RELEASE ORAL at 13:24

## 2023-02-07 RX ADMIN — MORPHINE SULFATE 4 MG: 2 INJECTION, SOLUTION INTRAMUSCULAR; INTRAVENOUS at 02:38

## 2023-02-07 RX ADMIN — TRAMADOL HYDROCHLORIDE 100 MG: 50 TABLET, COATED ORAL at 22:03

## 2023-02-07 RX ADMIN — ONDANSETRON 4 MG: 2 INJECTION INTRAMUSCULAR; INTRAVENOUS at 14:32

## 2023-02-07 RX ADMIN — MIDODRINE HYDROCHLORIDE 10 MG: 5 TABLET ORAL at 16:59

## 2023-02-07 RX ADMIN — Medication 25 UNITS: at 13:30

## 2023-02-07 RX ADMIN — ACETAMINOPHEN 650 MG: 325 TABLET ORAL at 07:27

## 2023-02-07 RX ADMIN — DOPAMINE HYDROCHLORIDE 2 MCG/KG/MIN: 160 INJECTION, SOLUTION INTRAVENOUS at 18:49

## 2023-02-07 RX ADMIN — AMIODARONE HYDROCHLORIDE 200 MG: 200 TABLET ORAL at 10:45

## 2023-02-07 NOTE — ANESTHESIA POSTPROCEDURE EVALUATION
Procedure(s):  CORONARY ARTERY BYPASS GRAFT (CABG) TIMES THREE.    general    Anesthesia Post Evaluation      Multimodal analgesia: multimodal analgesia used between 6 hours prior to anesthesia start to PACU discharge  Patient location during evaluation: ICU  Patient participation: complete - patient participated  Level of consciousness: awake  Pain score: 5  Airway patency: patent  Anesthetic complications: no  Cardiovascular status: acceptable  Respiratory status: acceptable  Hydration status: acceptable  Post anesthesia nausea and vomiting:  none  Final Post Anesthesia Temperature Assessment:  Normothermia (36.0-37.5 degrees C)      INITIAL Post-op Vital signs:   Vitals Value Taken Time   /62 02/07/23 0902   Temp 37.5 °C (99.5 °F) 02/07/23 0535   Pulse 73 02/07/23 0951   Resp 21 02/07/23 0951   SpO2 99 % 02/07/23 0951   Vitals shown include unvalidated device data.

## 2023-02-07 NOTE — PROGRESS NOTES
[facility] - Progress Note  Admit Date: 2/3/2023    Assessment:     -s/p CABG 2/6/23 by Dr. Tha Arroyo, Mari Jacques, SVG-OM, SVG-PDA  -LHC on 02/4/23 with RCA as well as left main disease.  -Echo 2/3/23 with EF 50-55%  -Obesity  -Diabetes  -Hypothyroidism  -Hypertension  -Hyperlipidemia     Primary cardiologist Dr. Carrington Gonzalez:     POD #1, continue routine post operative care per CTS team.   Rhythm stable, continue amiodarone. Wean pressors as able. Resume BB once able. Continue ASA, plavix, statin   Encourage ICS. PT/OT as tolerated    -------------------------------------------  Doing well postop CABG from yesterday. On midodrine and trying to wean pressors. Rhythm stable. Will follow. I saw, examined, and evaluated this patient and performed the substantive portion of the encounter for > 50% of the time including extensive history, physical exam, and medical decision making as discussed with patient and next-of-kin as needed. I personally reviewed the patient's labs, tests, vitals, orders, medications, updated history, and other providers assessments as well. I personally agree with the findings as stated and the plan as documented. Tristan Sutherland MD      Subjective:     Extubated yesterday. In recliner in NAD.      Objective:      Patient Vitals for the past 8 hrs:   Temp Pulse Resp BP SpO2   02/07/23 1100 -- 71 19 (!) 114/58 99 %   02/07/23 1000 -- 75 18 119/70 98 %   02/07/23 0902 -- 79 10 123/62 94 %   02/07/23 0830 -- 71 17 -- 100 %   02/07/23 0800 -- 70 16 (!) 107/54 100 %   02/07/23 0730 -- 77 20 -- 99 %   02/07/23 0702 -- 75 -- (!) 91/52 --   02/07/23 0700 -- 75 -- (!) 87/47 99 %   02/07/23 0600 -- 72 16 (!) 90/53 98 %   02/07/23 0500 99.5 °F (37.5 °C) 76 15 (!) 100/55 97 %   02/07/23 0445 -- -- -- -- 100 %   02/07/23 0400 99.5 °F (37.5 °C) 78 16 105/68 100 %           Patient Vitals for the past 96 hrs:   Weight   02/07/23 0600 79.8 kg (176 lb)   02/05/23 0530 73 kg (160 lb 15 oz)   02/04/23 0353 75.3 kg (165 lb 14.4 oz)                      Intake/Output Summary (Last 24 hours) at 2/7/2023 1127  Last data filed at 2/7/2023 1100  Gross per 24 hour   Intake 4623.72 ml   Output 2612 ml   Net 2011.72 ml         Physical Exam:  General:  alert, NAD   Neck:  supple   Lungs:  decreased  Heart:  RRR  Abdomen:  abdomen is soft without significant tenderness, masses, organomegaly or guarding  Extremities:  extremities normal, atraumatic, no cyanosis or edema    Data Review:     Labs: Results:       Chemistry Recent Labs     02/07/23  0420 02/06/23  1408 02/06/23  0518   * 212* 156*   * 142 140   K 4.2 3.6 3.8   * 112* 108   CO2 25 22 29   BUN 13 11 15   CREA 0.74 0.70 0.72   CA 7.9* 6.7* 8.9   MG 1.9 2.1  --    AGAP 4 8 3   BUCR 18 16 21*        CBC w/Diff Recent Labs     02/07/23  0420 02/06/23  1408 02/06/23  0518   WBC 10.7 10.3 5.4   RBC 2.71* 3.22* 4.26   HGB 8.6* 10.1* 13.3   HCT 26.1* 29.4* 39.3    148 223   GRANS 85* 84* 57   LYMPH 6* 8* 27   EOS 0 0 4        Cardiac Enzymes No results found for: CPK, CK, CKMMB, CKMB, RCK3, CKMBT, CKNDX, CKND1, LOYD, TROPT, TROIQ, BOY, TROPT, TNIPOC, BNP, BNPP   Coagulation Recent Labs     02/06/23  1408   PTP 16.6*   INR 1.3*   APTT 31.0         Lipid Panel Lab Results   Component Value Date/Time    Cholesterol, total 185 02/04/2023 05:45 AM    HDL Cholesterol 40 02/04/2023 05:45 AM    LDL, calculated 108.6 (H) 02/04/2023 05:45 AM    VLDL, calculated 36.4 02/04/2023 05:45 AM    Triglyceride 182 (H) 02/04/2023 05:45 AM    CHOL/HDL Ratio 4.6 02/04/2023 05:45 AM      BNP No results found for: BNP, BNPP, XBNPT   Liver Enzymes No results for input(s): TP, ALB, TBIL, AP in the last 72 hours.     No lab exists for component: SGOT, GPT, DBIL     Digoxin    Thyroid Studies Lab Results   Component Value Date/Time    TSH 1.40 02/04/2023 05:45 AM          Signed By: Ele Peterson PA-C     February 7, 2023

## 2023-02-07 NOTE — PROGRESS NOTES
Problem: Self Care Deficits Care Plan (Adult)  Goal: *Acute Goals and Plan of Care (Insert Text)  Description: Occupational Therapy Goals  Initiated 2/7/2023 within 7 day(s). 1.  Patient will perform upper body dressing with modified independence. 2.  Patient will perform lower body dressing with supervision/set-up with AE as needed. 3.  Patient will perform functional bed mobility in prep for self care with supervision/set-up. 4.  Patient will perform toilet transfers with supervision/set-up. 5.  Patient will perform all aspects of toileting with supervision/set-up. 6.  Patient will participate in upper extremity therapeutic exercise/activities with supervision/set-up for 8 minutes. 7.  Patient will utilize energy conservation techniques/sternal precautions during functional activities with verbal cues. Prior Level of Function:Pt reports independence     Outcome: Progressing Towards Goal   OCCUPATIONAL THERAPY EVALUATION    Patient: Duane Grego (27 y.o. female)  Date: 2/7/2023  Primary Diagnosis: Chest pain, unspecified type [R07.9]  CAD (coronary artery disease), native coronary artery [I25.10]  Procedure(s) (LRB):  CORONARY ARTERY BYPASS GRAFT (CABG) TIMES THREE (N/A) 1 Day Post-Op   Precautions:   Skin, Sternal  PLOF: see above    ASSESSMENT :  Based on the objective data described below, the patient presents with decreased funcitonal activity tolerance, impaired balance, decreased Ub strength, decreased functional moblity and ability to reach feet impacting independence in self care. Pt reclined in recliner chair at beginning of session and agreeable to OT session. Pt cotreated with PT to maximize pt safety. Pt educated on sternal precautions. Pt performed coming into upright position with cues for hand placement and CGA. Pt sat Edge of chair for ADL with set up for grooming and TD for donning socks. Pt requires several cues for relaxing shoulders and body generally as pt tends to hold tension. Pt performed sit>stand with CGA and stood performing marching for ~1-2 min in prep for toilet transfer with G to F balance. Pt returned to sitting and able to adjust self in seat with SBA. Pt set up with all needs in reach at end of session. Patient will benefit from skilled intervention to address the above impairments. Patient's rehabilitation potential is considered to be Good  Factors which may influence rehabilitation potential include:   []             None noted  []             Mental ability/status  [x]             Medical condition  []             Home/family situation and support systems  []             Safety awareness  [x]             Pain tolerance/management  []             Other:      PLAN :  Recommendations and Planned Interventions:   [x]               Self Care Training                  [x]      Therapeutic Activities  [x]               Functional Mobility Training   []      Cognitive Retraining  [x]               Therapeutic Exercises           [x]      Endurance Activities  [x]               Balance Training                    [x]      Neuromuscular Re-Education  []               Visual/Perceptual Training     [x]      Home Safety Training  [x]               Patient Education                   [x]      Family Training/Education  []               Other (comment):    Frequency/Duration: Patient will be followed by occupational therapy 1-2 times per day/4-7 days per week to address goals. Further Equipment Recommendations for Discharge: bedside commode and shower chair    AMPAC: Current research shows that an AM-PAC score of 17 or less is not associated with a discharge to the patient's home setting. Based on an AM-PAC score of 15/24 and their current ADL deficits; it is recommended that the patient have 3-5 sessions per week of Occupational Therapy at d/c to increase the patient's independence.       This AMPAC score should be considered in conjunction with interdisciplinary team recommendations to determine the most appropriate discharge setting. Patient's social support, diagnosis, medical stability, and prior level of function should also be taken into consideration. SUBJECTIVE:   Patient stated I am a very active person.     OBJECTIVE DATA SUMMARY:     Past Medical History:   Diagnosis Date    Diabetes ([address]     Fibromyalgia     HTN (hypertension)     Hypothyroid     Nausea & vomiting     Obesity     Varicose veins of both lower extremities      Past Surgical History:   Procedure Laterality Date    COLONOSCOPY N/A 10/7/2020    COLONOSCOPY, biopsy, polypectomies performed by Vero Jackson MD at [facility]    HX CHOLECYSTECTOMY      HX COLONOSCOPY      age 48    HX GYN      colposcopy and portion cervix removed    HX HEENT      tonsillectomy    HX HERNIA REPAIR      abdominal     Barriers to Learning/Limitations: None  Compensate with: visual, verbal, tactile, kinesthetic cues/model    Home Situation:   Home Situation  Home Environment: Private residence  # Steps to Enter: 4  One/Two Story Residence: Split level  Living Alone: No  Support Systems: Child(jam), Spouse/Significant Other  Patient Expects to be Discharged to[de-identified] Home  Current DME Used/Available at Home: [employer], bedside  Tub or Shower Type: Tub/Shower combination  [x]  Right hand dominant   []  Left hand dominant    Cognitive/Behavioral Status:  Neurologic State: Alert  Orientation Level: Oriented X4  Cognition: Follows commands       Skin: intact BUE  Edema: none noted BUE    Vision/Perceptual:       intact                              Coordination: BUE  Coordination: Generally decreased, functional  Fine Motor Skills-Upper: Left Impaired;Right Impaired    Gross Motor Skills-Upper: Left Intact; Right Intact    Balance:  Sitting: Impaired  Sitting - Static: Good (unsupported)  Sitting - Dynamic: Fair (occasional)  Standing: With support; Impaired  Standing - Static: Good  Standing - Dynamic : Fair    Strength: BUE    Strength: Generally decreased, functional                Tone & Sensation: BUE    Tone: Normal  Sensation: Impaired (transient numbness and tingling in B finger tips)                      Range of Motion: BUE    AROM: Generally decreased, functional                         Functional Mobility and Transfers for ADLs:  Bed Mobility:              Transfers:  Sit to Stand: Contact guard assistance  Stand to Sit: Contact guard assistance                                 ADL Assessment:   Feeding: Setup    Oral Facial Hygiene/Grooming: Setup    Bathing: Moderate assistance (based on clinical judgement)    Upper Body Dressing: Minimum assistance (based on clinical judgement)    Lower Body Dressing: Maximum assistance (based on clinical judgement)    Toileting: Moderate assistance (based on clinical judgement)   ADL Intervention:  Feeding  Drink to Mouth: Set-up; Supervision    Grooming  Grooming Assistance: Set-up  Position Performed: Seated in chair  Washing Face: Set-up  Brushing Teeth: Set-up    Lower Body Dressing Assistance  Socks: Total assistance (dependent)    Pain:  Pain level pre-treatment: 10/10  all over  Pain level post-treatment: 10/10 all over  Pain Intervention(s): Medication (see MAR); Rest, Ice, Repositioning   Response to intervention: Nurse notified, See doc flow    Activity Tolerance:   fair  Please refer to the flowsheet for vital signs taken during this treatment. After treatment:   [x] Patient left in no apparent distress sitting up in chair  [] Patient left in no apparent distress in bed  [x] Call bell left within reach  [x] Nursing notified  [x] Caregiver present  [] Bed alarm activated    COMMUNICATION/EDUCATION:   [x] Role of Occupational Therapy in the acute care setting  [x] Home safety education was provided and the patient/caregiver indicated understanding. [x] Patient/family have participated as able in goal setting and plan of care.   [x] Patient/family agree to work toward stated goals and plan of care. [] Patient understands intent and goals of therapy, but is neutral about his/her participation. [] Patient is unable to participate in goal setting and plan of care. Thank you for this referral.  Sherman Gordon OT  Time Calculation: 25 mins    Eval Complexity: History: MEDIUM Complexity : Expanded review of history including physical, cognitive and psychosocial  history ; Examination: MEDIUM Complexity : 3-5 performance deficits relating to physical, cognitive , or psychosocial skils that result in activity limitations and / or participation restrictions; Decision Making:MEDIUM Complexity : Patient may present with comorbidities that affect occupational performnce. Miniml to moderate modification of tasks or assistance (eg, physical or verbal ) with assesment(s) is necessary to enable patient to complete evaluation     [facility]-PAC® Daily Activity Inpatient Short Form (6-Clicks)*    How much HELP from another person does the patient currently need    (If the patient hasn't done an activity recently, how much help from another person do you think he/she would need if he/she tried?)   Total (Total A or Dep)   A Lot  (Mod to Max A)   A Little (Sup or Min A)   None (Mod I to I)   Putting on and taking off regular lower body clothing? [] 1 [x] 2 [] 3 [] 4   2. Bathing (including washing, rinsing,      drying)? [] 1 [x] 2 [] 3 [] 4   3. Toileting, which includes using toilet, bedpan or urinal?   [] 1 [x] 2 [] 3 [] 4   4. Putting on and taking off regular upper body clothing? [] 1 [] 2 [x] 3 [] 4   5. Taking care of personal grooming such as brushing teeth? [] 1 [] 2 [x] 3 [] 4   6. Eating meals?    [] 1 [] 2 [x] 3 [] 4

## 2023-02-07 NOTE — PROGRESS NOTES
Problem: Mobility Impaired (Adult and Pediatric)  Goal: *Acute Goals and Plan of Care (Insert Text)  Description: Physical Therapy Goals  Initiated 2/7/2023 and to be accomplished within 7 day(s)  1. Patient will move from supine to sit and sit to supine in bed with modified independence. 2.  Patient will transfer from bed to chair and chair to bed with modified independence using the least restrictive device. 3.  Patient will perform sit to stand with modified independence. 4.  Patient will ambulate with modified independence for 150 feet with the least restrictive device. 5.  Patient will ascend/descend 6 stairs with handrail(s) with modified independence. PLOF: Lives with spouse; sister to stay 24/7 at discharge. Split level home; 6 steps to main bedroom. Has ww, cane, wheelchair, and bedside commode. Outcome: Progressing Towards Goal   PHYSICAL THERAPY EVALUATION    Patient: Amanda Cavazos (77 y.o. female)  Date: 2/7/2023  Primary Diagnosis: Chest pain, unspecified type [R07.9]  CAD (coronary artery disease), native coronary artery [I25.10]  Procedure(s) (LRB):  CORONARY ARTERY BYPASS GRAFT (CABG) TIMES THREE (N/A) 1 Day Post-Op   Precautions: Skin, Sternal  ASSESSMENT :  Seated in recliner. Heart hugger donned. Educated on use of heart hugger and sternal precautions. Daughter at bedside and participated in education. Sit to stand without physical assist. Complies with sternal precautions. Completed standing marches x10 and standing balance at rollator for 2 minutes. Declines trial of amb. Returned to seated in recliner. BLE elevated. Educated on need for RN assistance with mobility; verbalized understanding. Call bell in reach. Patient will benefit from skilled intervention to address the above impairments.   Patient's rehabilitation potential is considered to be Fair  Factors which may influence rehabilitation potential include:   []         None noted  []         Mental ability/status  [x] Medical condition  []         Home/family situation and support systems  []         Safety awareness  []         Pain tolerance/management  []         Other:      PLAN :  Recommendations and Planned Interventions:   [x]           Bed Mobility Training             [x]    Neuromuscular Re-Education  [x]           Transfer Training                   []    Orthotic/Prosthetic Training  [x]           Gait Training                          []    Modalities  [x]           Therapeutic Exercises           []    Edema Management/Control  [x]           Therapeutic Activities            [x]    Family Training/Education  [x]           Patient Education  []           Other (comment):    Frequency/Duration: Patient will be followed by physical therapy 1-2 times per day/4-7 days per week to address goals. Further Equipment Recommendations for Discharge: rolling walker    [facility] Basic Mobility Inpatient Short Form:  18/24    This AMPAC score should be considered in conjunction with interdisciplinary team recommendations to determine the most appropriate discharge setting. Patient's social support, diagnosis, medical stability, and prior level of function should also be taken into consideration. Current research shows that an AM-PAC score of 18 (14 without stairs) or greater is associated with a discharge to the patient's home setting. Based on an AM-PAC score of 18/24 and current functional mobility deficits, it is recommended that the patient have 2-3 sessions per week of Physical Therapy at d/c to increase the patient's independence. SUBJECTIVE:   Patient stated I'm very active.     OBJECTIVE DATA SUMMARY:     Past Medical History:   Diagnosis Date    Diabetes ([address]     Fibromyalgia     HTN (hypertension)     Hypothyroid     Nausea & vomiting     Obesity     Varicose veins of both lower extremities      Past Surgical History:   Procedure Laterality Date    COLONOSCOPY N/A 10/7/2020    COLONOSCOPY, biopsy, polypectomies performed by Randal Nunez MD at [facility]    HX CHOLECYSTECTOMY      HX COLONOSCOPY      age 48    HX GYN      colposcopy and portion cervix removed    HX HEENT      tonsillectomy    HX HERNIA REPAIR      abdominal     Barriers to Learning/Limitations: None  Compensate with: Visual Cues, Verbal Cues, Tactile Cues and Kinesthetic Cues    Home Situation:  Home Situation  Home Environment: Private residence  # Steps to Enter: 4  One/Two Story Residence: hospitals level  Living Alone: No  Support Systems: Child(jam), Spouse/Significant Other  Patient Expects to be Discharged to[de-identified] Home  Current DME Used/Available at Home: Commode, bedside  Tub or Shower Type: Tub/Shower combination    Critical Behavior:  Neurologic State: Alert  Orientation Level: Oriented X4    Strength:    Manual Muscle Testing (LE)         R     L    Hip Flexion:   4/5 4/5  Knee EXT:   4/5 4/5  Knee FLEX:   4/5 4/5  Ankle DF:   4/5 4/5  _________________________________________________   Range Of Motion:  BLE AROM WFL  Functional Mobility:  Transfers:  Sit to Stand: Contact guard assistance  Stand to Sit: Contact guard assistance  Balance:   Sitting: Impaired  Sitting - Static: Good (unsupported)  Sitting - Dynamic: Fair (occasional)  Standing: With support; Impaired  Standing - Static: Good  Standing - Dynamic : Fair  Neuro Re-education:  Unsupported seated balance 10 minutes  Standing balance 2 minutes  Standing marches x10  Therapeutic Exercises:   Sit to stand x2  Pain:  Pain level pre-treatment: 10/10   Pain level post-treatment: 10/10     Activity Tolerance:   Fair    After treatment:   [x]         Patient left in no apparent distress sitting up in chair  []         Patient left in no apparent distress in bed  [x]         Call bell left within reach  [x]         Nursing notified  []         Caregiver present  []         Bed alarm activated  []         SCDs applied    COMMUNICATION/EDUCATION:   [x]         Role of physical therapy and plan of care in the acute care setting. [x]         Fall prevention education was provided and the patient/caregiver indicated understanding. [x]         Patient/family have participated as able in goal setting and plan of care. []         Patient/family agree to work toward stated goals and plan of care. []         Patient understands intent and goals of therapy, but is neutral about his/her participation. []         Patient is unable to participate in goal setting/plan of care: ongoing with therapy staff. Thank you for this referral.  Martha Salter, PT   Time Calculation: 25 mins    Eval Complexity: History: MEDIUM  Complexity : 1-2 comorbidities / personal factors will impact the outcome/ POC Exam:MEDIUM Complexity : 3 Standardized tests and measures addressing body structure, function, activity limitation and / or participation in recreation  Presentation: MEDIUM Complexity : Evolving with changing characteristics  Clinical Decision Making:Medium Complexity    Clinical judgement; ROM, MMT, functional mobility Overall Complexity:MEDIUM    [address] [facility] [address] AM-PAC® Basic Mobility Inpatient Short Form (6-Clicks) Version 2    How much HELP from another person does the patient currently need    (If the patient hasn't done an activity recently, how much help from another person do you think he/she would need if he/she tried?)   Total (Total A or Dep)   A Lot  (Mod to Max A)   A Little (Sup or Min A)   None (Mod I to I)   Turning from your back to your side while in a flat bed without using bedrails? [] 1 [] 2 [x] 3 [] 4   2. Moving from lying on your back to sitting on the side of a flat bed without using bedrails? [] 1 [] 2 [x] 3 [] 4   3. Moving to and from a bed to a chair (including a wheelchair)? [] 1 [] 2 [x] 3 [] 4   4. Standing up from a chair using your arms (e.g., wheelchair, or bedside chair)? [] 1 [] 2 [x] 3 [] 4   5. Walking in hospital room? [] 1 [] 2 [x] 3 [] 4   6.  Climbing 3-5 steps with a railing?+  Clinical judgement [] 1 [] 2 [x] 3 [] 4   +If stair climbing cannot be assessed, skip item #6. Sum responses from items 1-5.

## 2023-02-07 NOTE — PROGRESS NOTES
CARDIAC SURGERY PROGRESS NOTE    2023     Post Operative Day # 1     Chart reviewed. Interval History/Events of Past 24 hours:   Extubated last pm on RA. Dec UOP and BP. Assessment:  CAD S/P  CABG x 3 -  Respiratory parameters stable  Cardiac status stable     Plans:  resume ASA, Statin. Needs BB when BP ranjith  Add midodrine  Start Jacob now      Heart Hugger use encouraged to mitigate pain and will also assist with deep breathing and incentive spirometry goals. Possible discharge 4 days. Annita Mckeon PA-C  [facility]  [phone]  _____________________________________________________________________________________________________________________________________________  Subjective:  Patient seen and examined on rounds today. \"Im too sleepy and my fibromyalgia is acting up. \"      Objective:  Vital signs:   Visit Vitals  BP (!) 91/52   Pulse 75   Temp 99.5 °F (37.5 °C)   Resp 16   Ht 5' 6\" (1.676 m)   Wt 79.8 kg (176 lb)   SpO2 99%   Breastfeeding No   BMI 28.41 kg/m²     Temp (24hrs), Av °F (36.7 °C), Min:96.1 °F (35.6 °C), Max:99.5 °F (37.5 °C)    Admission Weight: Last Weight   Weight: 72.6 kg (160 lb) Weight: 79.8 kg (176 lb)     Last 3 Recorded Weights in this Encounter    23 0353 23 0530 23 0600   Weight: 75.3 kg (165 lb 14.4 oz) 73 kg (160 lb 15 oz) 79.8 kg (176 lb)       Telemetry: NSR    Physical Examination:     General:  Alert, oriented  Lungs: Clear to ascultation without rales, wheezes or rhonchi. Chest: Dressings clean and dry. Midline incision healing well. Sternum stable. Heart: regular rate and rhythm, No murmur. Abdomen: Soft and non-tender without masses. Bowel sounds present. Extremities: Warm and well perfused. Edema no. Incisions: clean and dry  Neuro: No deficit.       SUP Prophylaxis: Pepcid  DVT Prophylaxis:   pneumatic compression boots: Yes  Compression stockings:  Yes  DVT ppx: Yes SC Heparin    Chest tubes: present    Pacing wires:  present    DME needs:  tbd          Labs:  Lab Results   Component Value Date/Time    WBC 10.7 02/07/2023 04:20 AM    HCT 26.1 (L) 02/07/2023 04:20 AM     02/07/2023 04:20 AM      Lab Results   Component Value Date/Time     02/06/2023 02:08 PM    K 3.6 02/06/2023 02:08 PM     (H) 02/06/2023 02:08 PM    CO2 22 02/06/2023 02:08 PM     (H) 02/06/2023 02:08 PM    BUN 11 02/06/2023 02:08 PM    CREA 0.70 02/06/2023 02:08 PM    CREA 0.72 02/06/2023 05:18 AM    CREA 0.73 02/05/2023 05:39 AM     Lab Results   Component Value Date/Time    HBA1C 7.4 (H) 02/04/2023 05:45 AM     pH (POC)   Date Value Ref Range Status   02/07/2023 7.34 (L) 7.35 - 7.45   Final     pCO2 (POC)   Date Value Ref Range Status   02/07/2023 45.6 (H) 35.0 - 45.0 MMHG Final     pO2 (POC)   Date Value Ref Range Status   02/07/2023 111 (H) 80 - 100 MMHG Final     HCO3 (POC)   Date Value Ref Range Status   02/07/2023 24.8 22 - 26 MMOL/L Final     sO2 (POC)   Date Value Ref Range Status   02/07/2023 97.4 (H) 92 - 97 % Final     Base excess (POC)   Date Value Ref Range Status   02/06/2023 0.0 mmol/L Final         CXR: t/l ok' no space issues          PLEASE NOTE:  This document may have been produced using voice recognition software. Unrecognized errors in transcription may be present. Please call with any questions. NOTE TO PATIENT:  The purpose of this note is to communicate optimally with the other providers involved in your care. It is written using standard medical terminology. If you have questions regarding details of the note please call my office at [phone] and make an appointment to discuss your concerns.

## 2023-02-07 NOTE — PROGRESS NOTES
Problem: Falls - Risk of  Goal: *Absence of Falls  Description: Document Radha All Fall Risk and appropriate interventions in the flowsheet.   Outcome: Progressing Towards Goal  Note: Fall Risk Interventions:  Medication Interventions: Evaluate medications/consider consulting pharmacy, Teach patient to arise slowly    Problem: Patient Education: Go to Patient Education Activity  Goal: Patient/Family Education  Outcome: Progressing Towards Goal

## 2023-02-07 NOTE — PROGRESS NOTES
1900- Change of shift. Report received by Ugo Lunsford RN. Patient intubated with FIO2 of 40%. Phenylephrine at 10 mcg/min. Insulin at 4.3 units/hr. 4 chest tubes connected to 2 atriums noted and attached to constant wall suction. No air leak noted. Scant dressing noted and outlined on sternal dressing. Palpable pulses noted in BLE. Will assume care of patient. Wound Prevention Checklist  Patient: Duane Grego (63 y.o. female)  Date: 2/6/2023  Diagnosis: Chest pain, unspecified type [R07.9]  CAD (coronary artery disease), native coronary artery [I25.10] <principal problem not specified>  Precautions:    []  Heel prevention boots placed on patient  []  Patient turned q2h during shift  []  Lift team ordered  []  Patient on [facility] bed/Specialty bed  []  Each Wound is documented during shift (Stage, Color, drainage, odor, measurements, and dressings)  [x]  Dual skin check done with ERNESTO Salmeron RN      2029- Spoke with Dr. Aníbal Holt. Informed him on ABG results that were taken at 2026. Informed him that patient was moving all extremities and able to lift up her head. Informed him that RT was about to be called to see if patient was okay to extubate. Dr. Aníbal Holt instructed to place patient on BiPAP 12/8 post extubation and to call if PCO2 is greater than 45 on post extubation blood gas. 2056- Patient extubated. Patient complains of pain and given pain medication. 2130- Spoke to Dr. Aníbal Holt. Informed him on ABG results that were take at 2125. Informed him that pCO2 is now at 48. Dr. Aníbal Holt instructed to call RT to readjust settings and then collect another BG in an hour. If pCO2 is over 48 then call him. 2217- Spoke with Daughter, Joni Kwon and , Nataliya Parsons at patients request and updated both that patient had been extubated. 2231- Spoke with Dr. Aníbal Holt and informed him on ABG results that were taken at 31 75 62. No new orders given at this time.      2345- Lactic is now 1.8. pCO2 45.4, pH 7.34.    0000- Patient A/O x4 and resting in bed. No complaints of pain. 0528- Patient given pain medication. 0121- Potassium replaced. 4056- Patient given pain medication. 0311- Patient given pain medication. 0400- No assessment changes. No complaints of postop pain. 0530- Spoke with daughter,Nicole Perry and provided an update. [facility] 32 with CHELSY Bautista regarding patients BP decreasing. Patient SBP in 90, MAP lower than 70. Given order for 12.5 Albumin 5%. Nirali Dickersonar that patient had a urine output of 0. No new orders given at this time. 0740-  Change of shift. Report given to Moses Babcock RN.

## 2023-02-08 ENCOUNTER — APPOINTMENT (OUTPATIENT)
Dept: GENERAL RADIOLOGY | Age: 68
DRG: 948 | End: 2023-02-08
Attending: PHYSICIAN ASSISTANT
Payer: MEDICARE

## 2023-02-08 PROBLEM — Z95.1 S/P CABG X 3: Status: ACTIVE | Noted: 2023-02-06

## 2023-02-08 LAB
ABO + RH BLD: NORMAL
ANION GAP SERPL CALC-SCNC: 6 MMOL/L (ref 3–18)
BASOPHILS # BLD: 0 K/UL (ref 0–0.1)
BASOPHILS NFR BLD: 0 % (ref 0–2)
BLD PROD TYP BPU: NORMAL
BLD PROD TYP BPU: NORMAL
BLOOD GROUP ANTIBODIES SERPL: NORMAL
BPU ID: NORMAL
BPU ID: NORMAL
BUN SERPL-MCNC: 16 MG/DL (ref 7–18)
BUN/CREAT SERPL: 27 (ref 12–20)
CA-I SERPL-SCNC: 1.15 MMOL/L (ref 1.15–1.33)
CALCIUM SERPL-MCNC: 8.1 MG/DL (ref 8.5–10.1)
CHLORIDE SERPL-SCNC: 109 MMOL/L (ref 100–111)
CO2 SERPL-SCNC: 25 MMOL/L (ref 21–32)
CREAT SERPL-MCNC: 0.6 MG/DL (ref 0.6–1.3)
CROSSMATCH RESULT,%XM: NORMAL
CROSSMATCH RESULT,%XM: NORMAL
DIFFERENTIAL METHOD BLD: ABNORMAL
EOSINOPHIL # BLD: 0 K/UL (ref 0–0.4)
EOSINOPHIL NFR BLD: 0 % (ref 0–5)
ERYTHROCYTE [DISTWIDTH] IN BLOOD BY AUTOMATED COUNT: 13.4 % (ref 11.6–14.5)
GLUCOSE BLD STRIP.AUTO-MCNC: 133 MG/DL (ref 70–110)
GLUCOSE BLD STRIP.AUTO-MCNC: 138 MG/DL (ref 70–110)
GLUCOSE BLD STRIP.AUTO-MCNC: 153 MG/DL (ref 70–110)
GLUCOSE BLD STRIP.AUTO-MCNC: 163 MG/DL (ref 70–110)
GLUCOSE SERPL-MCNC: 151 MG/DL (ref 74–99)
HCT VFR BLD AUTO: 25.9 % (ref 35–45)
HGB BLD-MCNC: 8.8 G/DL (ref 12–16)
IMM GRANULOCYTES # BLD AUTO: 0.1 K/UL (ref 0–0.04)
IMM GRANULOCYTES NFR BLD AUTO: 1 % (ref 0–0.5)
LYMPHOCYTES # BLD: 1.3 K/UL (ref 0.9–3.6)
LYMPHOCYTES NFR BLD: 9 % (ref 21–52)
MAGNESIUM SERPL-MCNC: 1.8 MG/DL (ref 1.6–2.6)
MCH RBC QN AUTO: 31.7 PG (ref 24–34)
MCHC RBC AUTO-ENTMCNC: 34 G/DL (ref 31–37)
MCV RBC AUTO: 93.2 FL (ref 78–100)
MONOCYTES # BLD: 1.3 K/UL (ref 0.05–1.2)
MONOCYTES NFR BLD: 9 % (ref 3–10)
NEUTS SEG # BLD: 11.5 K/UL (ref 1.8–8)
NEUTS SEG NFR BLD: 81 % (ref 40–73)
NRBC # BLD: 0 K/UL (ref 0–0.01)
NRBC BLD-RTO: 0 PER 100 WBC
PLATELET # BLD AUTO: 184 K/UL (ref 135–420)
PMV BLD AUTO: 11.2 FL (ref 9.2–11.8)
POTASSIUM SERPL-SCNC: 3.8 MMOL/L (ref 3.5–5.5)
RBC # BLD AUTO: 2.78 M/UL (ref 4.2–5.3)
SODIUM SERPL-SCNC: 140 MMOL/L (ref 136–145)
SPECIMEN EXP DATE BLD: NORMAL
STATUS OF UNIT,%ST: NORMAL
STATUS OF UNIT,%ST: NORMAL
UNIT DIVISION, %UDIV: 0
UNIT DIVISION, %UDIV: 0
WBC # BLD AUTO: 14.2 K/UL (ref 4.6–13.2)

## 2023-02-08 PROCEDURE — 94762 N-INVAS EAR/PLS OXIMTRY CONT: CPT

## 2023-02-08 PROCEDURE — 65620000000 HC RM CCU GENERAL

## 2023-02-08 PROCEDURE — 80048 BASIC METABOLIC PNL TOTAL CA: CPT

## 2023-02-08 PROCEDURE — APPNB30 APP NON BILLABLE TIME 0-30 MINS: Performed by: PHYSICIAN ASSISTANT

## 2023-02-08 PROCEDURE — 74011000250 HC RX REV CODE- 250: Performed by: PHYSICIAN ASSISTANT

## 2023-02-08 PROCEDURE — 85025 COMPLETE CBC W/AUTO DIFF WBC: CPT

## 2023-02-08 PROCEDURE — 74011250637 HC RX REV CODE- 250/637: Performed by: THORACIC SURGERY (CARDIOTHORACIC VASCULAR SURGERY)

## 2023-02-08 PROCEDURE — 74011636637 HC RX REV CODE- 636/637: Performed by: PHYSICIAN ASSISTANT

## 2023-02-08 PROCEDURE — 71045 X-RAY EXAM CHEST 1 VIEW: CPT

## 2023-02-08 PROCEDURE — 99024 POSTOP FOLLOW-UP VISIT: CPT | Performed by: PHYSICIAN ASSISTANT

## 2023-02-08 PROCEDURE — 82330 ASSAY OF CALCIUM: CPT

## 2023-02-08 PROCEDURE — 82962 GLUCOSE BLOOD TEST: CPT

## 2023-02-08 PROCEDURE — 74011250637 HC RX REV CODE- 250/637: Performed by: PHYSICIAN ASSISTANT

## 2023-02-08 PROCEDURE — 74011250636 HC RX REV CODE- 250/636: Performed by: PHYSICIAN ASSISTANT

## 2023-02-08 PROCEDURE — 99232 SBSQ HOSP IP/OBS MODERATE 35: CPT | Performed by: INTERNAL MEDICINE

## 2023-02-08 PROCEDURE — 83735 ASSAY OF MAGNESIUM: CPT

## 2023-02-08 PROCEDURE — 9990 CHARGE CONVERSION

## 2023-02-08 PROCEDURE — 77010033678 HC OXYGEN DAILY

## 2023-02-08 PROCEDURE — 65270000029 HC RM PRIVATE

## 2023-02-08 RX ORDER — FUROSEMIDE 10 MG/ML
20 INJECTION INTRAMUSCULAR; INTRAVENOUS ONCE
Status: DISCONTINUED | OUTPATIENT
Start: 2023-02-08 | End: 2023-02-08

## 2023-02-08 RX ORDER — POTASSIUM CHLORIDE 20 MEQ/1
40 TABLET, EXTENDED RELEASE ORAL DAILY
Status: DISCONTINUED | OUTPATIENT
Start: 2023-02-08 | End: 2023-02-10

## 2023-02-08 RX ORDER — CHOLECALCIFEROL (VITAMIN D3) 125 MCG
10 CAPSULE ORAL
Status: DISCONTINUED | OUTPATIENT
Start: 2023-02-08 | End: 2023-02-11 | Stop reason: HOSPADM

## 2023-02-08 RX ORDER — FUROSEMIDE 10 MG/ML
20 INJECTION INTRAMUSCULAR; INTRAVENOUS DAILY
Status: DISCONTINUED | OUTPATIENT
Start: 2023-02-09 | End: 2023-02-09

## 2023-02-08 RX ORDER — FUROSEMIDE 10 MG/ML
20 INJECTION INTRAMUSCULAR; INTRAVENOUS 2 TIMES DAILY
Status: DISCONTINUED | OUTPATIENT
Start: 2023-02-08 | End: 2023-02-08

## 2023-02-08 RX ADMIN — HEPARIN SODIUM 5000 UNITS: 5000 INJECTION INTRAVENOUS; SUBCUTANEOUS at 16:00

## 2023-02-08 RX ADMIN — ASPIRIN 81 MG: 81 TABLET, COATED ORAL at 09:56

## 2023-02-08 RX ADMIN — METOCLOPRAMIDE HYDROCHLORIDE 10 MG: 5 INJECTION INTRAMUSCULAR; INTRAVENOUS at 06:12

## 2023-02-08 RX ADMIN — CHLORHEXIDINE GLUCONATE 0.12% ORAL RINSE 10 ML: 1.2 LIQUID ORAL at 17:12

## 2023-02-08 RX ADMIN — THERA TABS 1 TABLET: TAB at 09:56

## 2023-02-08 RX ADMIN — CHLORHEXIDINE GLUCONATE 0.12% ORAL RINSE 10 ML: 1.2 LIQUID ORAL at 09:54

## 2023-02-08 RX ADMIN — Medication 10 MG: at 22:46

## 2023-02-08 RX ADMIN — TRAMADOL HYDROCHLORIDE 100 MG: 50 TABLET, COATED ORAL at 16:00

## 2023-02-08 RX ADMIN — FAMOTIDINE 20 MG: 20 TABLET, FILM COATED ORAL at 09:56

## 2023-02-08 RX ADMIN — LEVOTHYROXINE SODIUM 100 MCG: 100 TABLET ORAL at 09:55

## 2023-02-08 RX ADMIN — ACETAMINOPHEN 1000 MG: 500 TABLET ORAL at 12:27

## 2023-02-08 RX ADMIN — MIDODRINE HYDROCHLORIDE 10 MG: 5 TABLET ORAL at 09:55

## 2023-02-08 RX ADMIN — FAMOTIDINE 20 MG: 20 TABLET, FILM COATED ORAL at 17:11

## 2023-02-08 RX ADMIN — AMIODARONE HYDROCHLORIDE 200 MG: 200 TABLET ORAL at 09:56

## 2023-02-08 RX ADMIN — POTASSIUM CHLORIDE 40 MEQ: 1500 TABLET, EXTENDED RELEASE ORAL at 09:55

## 2023-02-08 RX ADMIN — ACETAMINOPHEN 1000 MG: 500 TABLET ORAL at 17:11

## 2023-02-08 RX ADMIN — MORPHINE SULFATE 4 MG: 2 INJECTION, SOLUTION INTRAMUSCULAR; INTRAVENOUS at 22:32

## 2023-02-08 RX ADMIN — Medication 25 UNITS: at 09:53

## 2023-02-08 RX ADMIN — SODIUM CHLORIDE, PRESERVATIVE FREE 10 ML: 5 INJECTION INTRAVENOUS at 16:01

## 2023-02-08 RX ADMIN — IRON SUCROSE 200 MG: 20 INJECTION, SOLUTION INTRAVENOUS at 09:56

## 2023-02-08 RX ADMIN — BISACODYL 10 MG: 5 TABLET, COATED ORAL at 09:55

## 2023-02-08 RX ADMIN — AMIODARONE HYDROCHLORIDE 200 MG: 200 TABLET ORAL at 22:31

## 2023-02-08 RX ADMIN — TRAMADOL HYDROCHLORIDE 100 MG: 50 TABLET, COATED ORAL at 09:53

## 2023-02-08 RX ADMIN — SODIUM CHLORIDE, PRESERVATIVE FREE 10 ML: 5 INJECTION INTRAVENOUS at 22:47

## 2023-02-08 RX ADMIN — MIDODRINE HYDROCHLORIDE 10 MG: 5 TABLET ORAL at 12:27

## 2023-02-08 RX ADMIN — Medication 2 UNITS: at 09:53

## 2023-02-08 RX ADMIN — FUROSEMIDE 20 MG: 10 INJECTION, SOLUTION INTRAMUSCULAR; INTRAVENOUS at 09:56

## 2023-02-08 RX ADMIN — POLYETHYLENE GLYCOL 3350 17 G: 17 POWDER, FOR SOLUTION ORAL at 09:53

## 2023-02-08 RX ADMIN — HEPARIN SODIUM 5000 UNITS: 5000 INJECTION INTRAVENOUS; SUBCUTANEOUS at 09:56

## 2023-02-08 RX ADMIN — Medication 2 UNITS: at 12:27

## 2023-02-08 RX ADMIN — MUPIROCIN: 20 OINTMENT TOPICAL at 09:57

## 2023-02-08 RX ADMIN — MIDODRINE HYDROCHLORIDE 10 MG: 5 TABLET ORAL at 17:11

## 2023-02-08 RX ADMIN — SODIUM CHLORIDE, PRESERVATIVE FREE 10 ML: 5 INJECTION INTRAVENOUS at 06:12

## 2023-02-08 RX ADMIN — ATORVASTATIN CALCIUM 20 MG: 20 TABLET, FILM COATED ORAL at 17:11

## 2023-02-08 RX ADMIN — HEPARIN SODIUM 5000 UNITS: 5000 INJECTION INTRAVENOUS; SUBCUTANEOUS at 22:31

## 2023-02-08 RX ADMIN — AMIODARONE HYDROCHLORIDE 200 MG: 200 TABLET ORAL at 16:00

## 2023-02-08 NOTE — PROGRESS NOTES
07:15    Received pt up in chair from [facility] A&O x 4. BP with maps 64-70 on 15 mcg of neosynephrine. 4 chest tubes to 2 atriums to constant wall suction. No air leak noted. Pt on insulin drip. ART line in situ,    Wound Prevention Checklist    Patient: Gutierrez Saenz (38 y.o. female)  Date: 2/7/2023  Diagnosis: Chest pain, unspecified type [R07.9]  CAD (coronary artery disease), native coronary artery [I25.10] <principal problem not specified>    Precautions: Skin, Sternal       []  Heel prevention boots placed on patient    []  Patient turned q2h during shift    []  Lift team ordered    []  Patient on [facility] bed/Specialty bed    []  Each Wound is documented during shift (Stage, Color, drainage, odor, measurements, and dressings)    [x]  Dual skin check done with ERNESTO Huffman RN   07:30  Pt c/o \"fibromyalgia pain\". Refused offered hydrocodone. Requested tylenol. Tylenol administered. 07:40  Dr. Andree Virk at bedside. Orders received to start midodrine and to in crease Neosynephrine to 40 mcg/min. Pt stood from chair with one-person assist with strong legs and good balance. Denies dizziness. 08:30  Pt with poor po intake. 12:00  Pt urinary output 25-30 ml/hr. 12:16  2 mg morphine administered for 9/10 pain. Refused additional tylenol r/t ineffective. Pt with frequent burping. 13:09  Spoke with CHELSY Ramirez - tramadol ordered for pain control. Updated on poor urinary output. 13:30  25 units lantus sq administered  14:02  Received order for reglan for frequent burping. 14:35  Insulin drip discontinued. 16:20  Dr. Andree Virk at bedside. Order to start dopamine drip at 2 mcg/kg/min ordered for renal perfusion. Will wean neosynephrine if pt maintains new parameters of   SBP 140s-150s.    17:30  IM injection vitamin b-12 administered.     Pt refused dinner  19:15  Bedside and Verbal shift change report given to Vic Nelson RN (oncoming nurse) by Keiry Lee David Rodriguez RN (offgoing nurse). Report included the following information SBAR, Kardex, Procedure Summary, Intake/Output, MAR, Recent Results, Med Rec Status, and Cardiac Rhythm sinus rhythm with occasional PVCs .

## 2023-02-08 NOTE — CONSULTS
Comprehensive Nutrition Assessment    Type and Reason for Visit: Initial, Consult    Nutrition Recommendations/Plan:   Add oral nutrition supplement to optimize nutrition intake opportunity: Glucerna (each provides 220 kcal, 10g protein) BID  Continue current diet and monitor PO intake, compliance with oral supplement, weight, and POC while admitted. Malnutrition Assessment:  Malnutrition Status: At risk for malnutrition (specify) (r/t poor meal intake since procedure 2/2 chest pain) (02/08/23 1245)      Nutrition History and Allergies:   PMHx of CAD, hypothyroidism, T2DM, HTN, HLD, osteoarthritis. Pt came in for and is now s/p CABG x3. Limited recent wt hx: c wt- 177 lb (standing scale); 1/13/23- 166 lb; 10/28/20- 158 lb. Pt without significant wt los x >2 years, though likely fluid shifts make assessment more difficult. NKFA. Nutrition Assessment:    Received consult r/t pt being post-op CABG x3. Visited pt- sitting upright in bedside chair, alert and able to communicate. Prepares their own meals- described not eating \"much\" at baseline, but denied skipping meals. Described cooking with olive oil, eating oatmeal often for breakfast, choosing chicken for main meat choice. Provided handout and education regarding DASH diet. Pt does not add salt to food, and reads labels at baseline. Discussed lowering total/saturated fat intake and increasing fruit and vegetable intake. Answered questions to satisfaction and left handout with pt. Pt with low PO intake since procedure, multiple episodes of refusing meals 2/07 r/t pt with chest pain. Will trial supplement until intake improves. Nutrition Related Findings:    Last BM 2/05. Pertinent labs: Ca (8.1) L. Pertinent meds: Dulcolax, Pepcid, Lasix, Humalog, Lantus, Synthroid, KCl 40 mEq tablets, MVI.  Wound Type: Surgical incision     Current Nutrition Intake & Therapies:  Average Meal Intake: 1-25% (since procedure, often refusing meals)  Average Supplement Intake: None ordered  ADULT DIET Regular; 4 carb choices (60 gm/meal); Low Fat/Low Chol/High Fiber/JORGE; No Salt Added (3-4 gm); No Concentrated Sweets    Anthropometric Measures:  Height: 5' 6\" (167.6 cm)  Ideal Body Weight (IBW): 130 lbs (59 kg)     Current Body Wt:  80.3 kg (177 lb), 136.2 % IBW. Standing scale  Current BMI (kg/m2): 28.6  Usual Body Weight: 75.3 kg (166 lb) (1/13/22)  % Weight Change (Calculated): 6.6  Weight Adjustment: No adjustment  BMI Category: Overweight (BMI 25.0-29. 9)    Estimated Daily Nutrient Needs:  Energy Requirements Based On: Formula (MSJ x 1.1-1.3)  Weight Used for Energy Requirements: Current  Energy (kcal/day): 1491 - 1762  Weight Used for Protein Requirements: Current (1-1.2)  Protein (g/day): 80 - 96  Method Used for Fluid Requirements: 1 ml/kcal  Fluid (ml/day): 1491 - 1762    Nutrition Diagnosis:   Inadequate oral intake related to cardiac dysfunction (s/p CABG x 3) as evidenced by intake 0-25%    Nutrition Education:  Educated on Cardiac diet  Learners: Patient  Readiness: Acceptance  Method: Explanation and Handout  Response: Verbalizes Understanding  Contact name and number provided.     Nutrition Interventions:   Food and/or Nutrient Delivery: Continue current diet, Start oral nutrition supplement  Nutrition Education/Counseling: Education completed  Coordination of Nutrition Care: Continue to monitor while inpatient  Plan of Care discussed with: Pt    Goals:     Goals: Meet at least 75% of estimated needs, by next RD assessment       Nutrition Monitoring and Evaluation:      Food/Nutrient Intake Outcomes: Food and nutrient intake, Supplement intake, Vitamin/mineral intake  Physical Signs/Symptoms Outcomes: Biochemical data, GI status, Meal time behavior, Weight    Discharge Planning:    Continue current diet    Caryle Cree, [address]  Contact: [phone]

## 2023-02-08 NOTE — PROGRESS NOTES
0700-received shift report from off going nurse Jada Yanes RN. Pt in recliner resting quietly with no complaints at this time. Drips verified. 1330-pt ambulated in the hallway ~100 feet. Pt tolerated well. 1745-pt ambulated in the hallway ~300 feet. Pt tolerated well.

## 2023-02-08 NOTE — PROGRESS NOTES
CARDIAC SURGERY PROGRESS NOTE    2023     Post Operative Day # 2     Chart reviewed. Interval History/Events of Past 24 hours:   Up to chair on Dopa 2, Jacob 25. Chest tubes still draining. labs reviewed -> stable    Assessment:  CAD S/P  CABG x 3 - ASA, statin, plavix  Hypotension on Midodrine  Afib ppx on Amio  Respiratory parameters stable  Cardiac status stable   Fibromyalgia no home meds    Plans:  Needs BB when BP ranjith off midodrine  Wean Jacob as BP ranjith  Lasix/K BID today      Heart Hugger use encouraged to mitigate pain and will also assist with deep breathing and incentive spirometry goals. Possible discharge 3 days. Annita Mckeon PA-C  [facility]  [phone]  _____________________________________________________________________________________________________________________________________________  Subjective:  Patient seen and examined on rounds today. Pain controlled    Objective:  Vital signs:   Visit Vitals  BP (!) 141/66   Pulse 81   Temp 98.8 °F (37.1 °C)   Resp 24   Ht 5' 6\" (1.676 m)   Wt 80.3 kg (177 lb)   SpO2 97%   Breastfeeding No   BMI 28.57 kg/m²     Temp (24hrs), Av.9 °F (37.2 °C), Min:98.8 °F (37.1 °C), Max:99.1 °F (37.3 °C)    Admission Weight: Last Weight   Weight: 72.6 kg (160 lb) Weight: 80.3 kg (177 lb)     Last 3 Recorded Weights in this Encounter    23 0530 23 0600 23 0430   Weight: 73 kg (160 lb 15 oz) 79.8 kg (176 lb) 80.3 kg (177 lb)       Telemetry: NSR    Physical Examination:     General:  Alert, oriented  Lungs: Clear to ascultation without rales, wheezes or rhonchi. Chest: Dressings clean and dry. Midline incision healing well. Sternum stable. Heart: regular rate and rhythm, No murmur. Abdomen: Soft and non-tender without masses. Bowel sounds present. Extremities: Warm and well perfused. Edema no. Incisions: clean and dry  Neuro: No deficit.       SUP Prophylaxis: Pepcid  DVT Prophylaxis: pneumatic compression boots: Yes  Compression stockings:  Yes  DVT ppx: Yes SC Heparin    Chest tubes:  present    Pacing wires:  present    DME needs:  tbd          Labs:  Lab Results   Component Value Date/Time    WBC 14.2 (H) 02/08/2023 05:00 AM    HCT 25.9 (L) 02/08/2023 05:00 AM     02/08/2023 05:00 AM      Lab Results   Component Value Date/Time     02/08/2023 05:00 AM    K 3.8 02/08/2023 05:00 AM     02/08/2023 05:00 AM    CO2 25 02/08/2023 05:00 AM     (H) 02/08/2023 05:00 AM    BUN 16 02/08/2023 05:00 AM    CREA 0.60 02/08/2023 05:00 AM    CREA 0.68 02/07/2023 03:00 PM    CREA 0.74 02/07/2023 04:20 AM     Lab Results   Component Value Date/Time    HBA1C 7.4 (H) 02/04/2023 05:45 AM     pH (POC)   Date Value Ref Range Status   02/07/2023 7.34 (L) 7.35 - 7.45   Final     pCO2 (POC)   Date Value Ref Range Status   02/07/2023 45.6 (H) 35.0 - 45.0 MMHG Final     pO2 (POC)   Date Value Ref Range Status   02/07/2023 111 (H) 80 - 100 MMHG Final     HCO3 (POC)   Date Value Ref Range Status   02/07/2023 24.8 22 - 26 MMOL/L Final     sO2 (POC)   Date Value Ref Range Status   02/07/2023 97.4 (H) 92 - 97 % Final     Base excess (POC)   Date Value Ref Range Status   02/06/2023 0.0 mmol/L Final         CXR: t/l ok' no space issues          PLEASE NOTE:  This document may have been produced using voice recognition software. Unrecognized errors in transcription may be present. Please call with any questions. NOTE TO PATIENT:  The purpose of this note is to communicate optimally with the other providers involved in your care. It is written using standard medical terminology. If you have questions regarding details of the note please call my office at [phone] and make an appointment to discuss your concerns.

## 2023-02-08 NOTE — PROGRESS NOTES
[facility] - Progress Note  Admit Date: 2/3/2023    Assessment:     -s/p CABG 2/6/23 by Dr. Rico Cadena, Peter Valenzuela, SVG-OM, SVG-PDA  -LHC on 02/4/23 with RCA as well as left main disease.  -Echo 2/3/23 with EF 50-55%  -Obesity  -Diabetes  -Hypothyroidism  -Hypertension  -Hyperlipidemia     Primary cardiologist Dr. Miguel Parsons:     CABG x3 POD #3  Currently on low-dose of dopamine and Jacob-Synephrine. Blood pressure stable  Also on aspirin, Plavix and Lasix. On prophylactic amiodarone. Currently in sinus rhythm  Continue routine postop care per CT surgery team  We will follow      Subjective:     Has some chest wall pain from surgery.   Otherwise no specific complaints    Objective:      Patient Vitals for the past 8 hrs:   Temp Pulse Resp BP SpO2   02/08/23 1033 -- 83 -- -- --   02/08/23 1000 -- 90 27 (!) 145/73 --   02/08/23 0953 -- 84 -- 139/70 --   02/08/23 0900 -- 84 24 139/70 94 %   02/08/23 0842 -- 81 -- (!) 152/70 --   02/08/23 0800 -- 84 22 (!) 152/70 (!) 86 %   02/08/23 0615 -- 81 24 -- 97 %   02/08/23 0600 -- 84 27 (!) 141/66 97 %   02/08/23 0545 -- 76 19 -- 99 %   02/08/23 0530 -- 86 27 -- 100 %   02/08/23 0515 -- 77 20 -- 99 %   02/08/23 0500 -- 80 26 (!) 149/61 97 %   02/08/23 0430 -- 88 27 139/83 100 %   02/08/23 0415 98.8 °F (37.1 °C) 76 23 131/69 96 %   02/08/23 0400 98.8 °F (37.1 °C) -- -- -- 93 %   02/08/23 0345 -- 83 25 -- 93 %   02/08/23 0330 -- 75 19 -- 95 %   02/08/23 0315 -- 87 23 -- 94 %   02/08/23 0300 -- 80 20 126/65 93 %   02/08/23 0245 -- 73 17 -- 94 %           Patient Vitals for the past 96 hrs:   Weight   02/08/23 0430 80.3 kg (177 lb)   02/07/23 0600 79.8 kg (176 lb)   02/05/23 0530 73 kg (160 lb 15 oz)                      Intake/Output Summary (Last 24 hours) at 2/8/2023 1043  Last data filed at 2/8/2023 1000  Gross per 24 hour   Intake 1281.57 ml   Output 3482 ml   Net -2200.43 ml       No apparent distress  Neck:  nontender  Lungs:  decreased, chest tube present  Heart: regular rate and rhythm, S1, S2 normal, no murmur, click, rub or gallop  Abdomen:  abdomen is soft without significant tenderness,   Extremities: No obvious edema. Under wrap bandages    Data Review:     Labs: Results:       Chemistry Recent Labs     02/08/23  0500 02/07/23  1500 02/07/23  0420 02/06/23  1408   * 132* 102* 212*    142 146* 142   K 3.8 3.7 4.2 3.6    112* 117* 112*   CO2 25 24 25 22   BUN 16 15 13 11   CREA 0.60 0.68 0.74 0.70   CA 8.1* 8.0* 7.9* 6.7*   MG 1.8  --  1.9 2.1   AGAP 6 6 4 8   BUCR 27* 22* 18 16        CBC w/Diff Recent Labs     02/08/23  0500 02/07/23  0420 02/06/23  1408   WBC 14.2* 10.7 10.3   RBC 2.78* 2.71* 3.22*   HGB 8.8* 8.6* 10.1*   HCT 25.9* 26.1* 29.4*    173 148   GRANS 81* 85* 84*   LYMPH 9* 6* 8*   EOS 0 0 0        Cardiac Enzymes No results found for: CPK, CK, CKMMB, CKMB, RCK3, CKMBT, CKNDX, CKND1, LOYD, TROPT, TROIQ, BOY, TROPT, TNIPOC, BNP, BNPP   Coagulation Recent Labs     02/06/23  1408   PTP 16.6*   INR 1.3*   APTT 31.0         Lipid Panel Lab Results   Component Value Date/Time    Cholesterol, total 185 02/04/2023 05:45 AM    HDL Cholesterol 40 02/04/2023 05:45 AM    LDL, calculated 108.6 (H) 02/04/2023 05:45 AM    VLDL, calculated 36.4 02/04/2023 05:45 AM    Triglyceride 182 (H) 02/04/2023 05:45 AM    CHOL/HDL Ratio 4.6 02/04/2023 05:45 AM      BNP No results found for: BNP, BNPP, XBNPT   Liver Enzymes No results for input(s): TP, ALB, TBIL, AP in the last 72 hours.     No lab exists for component: SGOT, GPT, DBIL     Digoxin    Thyroid Studies Lab Results   Component Value Date/Time    TSH 1.40 02/04/2023 05:45 AM          Signed By: Fortunato Paz MD     February 8, 2023

## 2023-02-08 NOTE — PROGRESS NOTES
1923:  Lines and drips traced and verified. Patient complained of pain during shift change, PRN administered, see MAR.      2024:  Family at bedside. 2110:  Family left bedside, patient resting comfortably with eyes closed. 2200:  Patient woke up in pain from her fibromyalgia per patient. Blood pressure higher than goal, and Jacob titrated accordingly, see MAR. PRN tramadol administered and changed patient's potion. A-line zeroed and is accurate. 2300:  Patient's cuff BP not accurate. Patient has arm elevated. Attempted to educate patient on importance of proper BP monitoring. Patient states her arm needs to be high right now. Will use A-line for readings. 2350: Patient called this RN to bedside for pain and position change. See MAR.     0030:  Patient called to be pulled up in bed. 0145:  Patient called for position change. 0430:  Patient bathed, dressings changed, weight taken, and patient up in the chair. 0500:  Labs drawn, x-ray at bedside. 4239:  Patient asked to be put back in bed. Educated patient on importance of being up in chair for recovery. Patient states \"I have not slept all night. \". This RN reassured patient about sleeping on and off for most of the night and restated importance of being up in chair. Patient gives verbal understanding and has no other needs at this time. 0700:  Bedside shift change report given to [address] RHONDA Ulrich RN (oncoming nurse) by Shahzad Hunter RN   (offgoing nurse). Report included the following information SBAR, Kardex, Procedure Summary, Intake/Output, MAR, Cardiac Rhythm NSR , and Alarm Parameters .

## 2023-02-09 ENCOUNTER — APPOINTMENT (OUTPATIENT)
Dept: GENERAL RADIOLOGY | Age: 68
DRG: 948 | End: 2023-02-09
Attending: PHYSICIAN ASSISTANT
Payer: MEDICARE

## 2023-02-09 DIAGNOSIS — Z95.1 S/P CABG X 3: Primary | ICD-10-CM

## 2023-02-09 LAB
ANION GAP SERPL CALC-SCNC: 3 MMOL/L (ref 3–18)
BASOPHILS # BLD: 0 K/UL (ref 0–0.1)
BASOPHILS NFR BLD: 0 % (ref 0–2)
BUN SERPL-MCNC: 12 MG/DL (ref 7–18)
BUN/CREAT SERPL: 29 (ref 12–20)
CALCIUM SERPL-MCNC: 8.4 MG/DL (ref 8.5–10.1)
CHLORIDE SERPL-SCNC: 107 MMOL/L (ref 100–111)
CO2 SERPL-SCNC: 30 MMOL/L (ref 21–32)
CREAT SERPL-MCNC: 0.42 MG/DL (ref 0.6–1.3)
DIFFERENTIAL METHOD BLD: ABNORMAL
EOSINOPHIL # BLD: 0.1 K/UL (ref 0–0.4)
EOSINOPHIL NFR BLD: 1 % (ref 0–5)
ERYTHROCYTE [DISTWIDTH] IN BLOOD BY AUTOMATED COUNT: 13.1 % (ref 11.6–14.5)
GLUCOSE BLD STRIP.AUTO-MCNC: 123 MG/DL (ref 70–110)
GLUCOSE BLD STRIP.AUTO-MCNC: 129 MG/DL (ref 70–110)
GLUCOSE BLD STRIP.AUTO-MCNC: 164 MG/DL (ref 70–110)
GLUCOSE BLD STRIP.AUTO-MCNC: 99 MG/DL (ref 70–110)
GLUCOSE SERPL-MCNC: 115 MG/DL (ref 74–99)
HCT VFR BLD AUTO: 26 % (ref 35–45)
HGB BLD-MCNC: 8.7 G/DL (ref 12–16)
IMM GRANULOCYTES # BLD AUTO: 0.1 K/UL (ref 0–0.04)
IMM GRANULOCYTES NFR BLD AUTO: 1 % (ref 0–0.5)
LYMPHOCYTES # BLD: 1.4 K/UL (ref 0.9–3.6)
LYMPHOCYTES NFR BLD: 13 % (ref 21–52)
MAGNESIUM SERPL-MCNC: 1.7 MG/DL (ref 1.6–2.6)
MCH RBC QN AUTO: 31.4 PG (ref 24–34)
MCHC RBC AUTO-ENTMCNC: 33.5 G/DL (ref 31–37)
MCV RBC AUTO: 93.9 FL (ref 78–100)
MONOCYTES # BLD: 1 K/UL (ref 0.05–1.2)
MONOCYTES NFR BLD: 9 % (ref 3–10)
NEUTS SEG # BLD: 8.2 K/UL (ref 1.8–8)
NEUTS SEG NFR BLD: 76 % (ref 40–73)
NRBC # BLD: 0.02 K/UL (ref 0–0.01)
NRBC BLD-RTO: 0.2 PER 100 WBC
PLATELET # BLD AUTO: 201 K/UL (ref 135–420)
PMV BLD AUTO: 10.5 FL (ref 9.2–11.8)
POTASSIUM SERPL-SCNC: 3.7 MMOL/L (ref 3.5–5.5)
RBC # BLD AUTO: 2.77 M/UL (ref 4.2–5.3)
SODIUM SERPL-SCNC: 140 MMOL/L (ref 136–145)
WBC # BLD AUTO: 10.8 K/UL (ref 4.6–13.2)

## 2023-02-09 PROCEDURE — 77030040393 HC DRSG OPTIFOAM GENT MDII -B

## 2023-02-09 PROCEDURE — 74011636637 HC RX REV CODE- 636/637: Performed by: PHYSICIAN ASSISTANT

## 2023-02-09 PROCEDURE — 65610000006 HC RM INTENSIVE CARE

## 2023-02-09 PROCEDURE — 82962 GLUCOSE BLOOD TEST: CPT

## 2023-02-09 PROCEDURE — 74011250637 HC RX REV CODE- 250/637: Performed by: PHYSICIAN ASSISTANT

## 2023-02-09 PROCEDURE — 99024 POSTOP FOLLOW-UP VISIT: CPT | Performed by: PHYSICIAN ASSISTANT

## 2023-02-09 PROCEDURE — 65270000029 HC RM PRIVATE

## 2023-02-09 PROCEDURE — 80048 BASIC METABOLIC PNL TOTAL CA: CPT

## 2023-02-09 PROCEDURE — 71045 X-RAY EXAM CHEST 1 VIEW: CPT

## 2023-02-09 PROCEDURE — 94762 N-INVAS EAR/PLS OXIMTRY CONT: CPT

## 2023-02-09 PROCEDURE — 74011250637 HC RX REV CODE- 250/637: Performed by: THORACIC SURGERY (CARDIOTHORACIC VASCULAR SURGERY)

## 2023-02-09 PROCEDURE — 74011250636 HC RX REV CODE- 250/636: Performed by: PHYSICIAN ASSISTANT

## 2023-02-09 PROCEDURE — 85025 COMPLETE CBC W/AUTO DIFF WBC: CPT

## 2023-02-09 PROCEDURE — 97535 SELF CARE MNGMENT TRAINING: CPT

## 2023-02-09 PROCEDURE — 83735 ASSAY OF MAGNESIUM: CPT

## 2023-02-09 PROCEDURE — 97116 GAIT TRAINING THERAPY: CPT

## 2023-02-09 PROCEDURE — 9990 CHARGE CONVERSION

## 2023-02-09 PROCEDURE — 97530 THERAPEUTIC ACTIVITIES: CPT

## 2023-02-09 PROCEDURE — 2709999900 HC NON-CHARGEABLE SUPPLY

## 2023-02-09 PROCEDURE — 74011000250 HC RX REV CODE- 250: Performed by: PHYSICIAN ASSISTANT

## 2023-02-09 PROCEDURE — 74011250636 HC RX REV CODE- 250/636: Performed by: THORACIC SURGERY (CARDIOTHORACIC VASCULAR SURGERY)

## 2023-02-09 PROCEDURE — 99232 SBSQ HOSP IP/OBS MODERATE 35: CPT | Performed by: INTERNAL MEDICINE

## 2023-02-09 PROCEDURE — APPNB60 APP NON BILLABLE TIME 46-60 MINS: Performed by: PHYSICIAN ASSISTANT

## 2023-02-09 RX ORDER — MAGNESIUM SULFATE HEPTAHYDRATE 40 MG/ML
2 INJECTION, SOLUTION INTRAVENOUS ONCE
Status: COMPLETED | OUTPATIENT
Start: 2023-02-09 | End: 2023-02-09

## 2023-02-09 RX ORDER — ACETAZOLAMIDE 500 MG/1
500 CAPSULE, EXTENDED RELEASE ORAL EVERY 8 HOURS
Status: COMPLETED | OUTPATIENT
Start: 2023-02-09 | End: 2023-02-09

## 2023-02-09 RX ORDER — PHENYLEPHRINE HCL IN 0.9% NACL 50MG/250ML
10-100 PLASTIC BAG, INJECTION (ML) INTRAVENOUS
Status: DISCONTINUED | OUTPATIENT
Start: 2023-02-09 | End: 2023-02-11 | Stop reason: HOSPADM

## 2023-02-09 RX ORDER — FUROSEMIDE 10 MG/ML
20 INJECTION INTRAMUSCULAR; INTRAVENOUS ONCE
Status: COMPLETED | OUTPATIENT
Start: 2023-02-09 | End: 2023-02-09

## 2023-02-09 RX ORDER — CALCIUM GLUCONATE 20 MG/ML
1 INJECTION, SOLUTION INTRAVENOUS
Status: COMPLETED | OUTPATIENT
Start: 2023-02-09 | End: 2023-02-09

## 2023-02-09 RX ORDER — DOPAMINE HYDROCHLORIDE 160 MG/100ML
1 INJECTION, SOLUTION INTRAVENOUS CONTINUOUS
Status: DISCONTINUED | OUTPATIENT
Start: 2023-02-09 | End: 2023-02-11 | Stop reason: HOSPADM

## 2023-02-09 RX ORDER — CLOPIDOGREL BISULFATE 75 MG/1
75 TABLET ORAL DAILY
Status: DISCONTINUED | OUTPATIENT
Start: 2023-02-09 | End: 2023-02-11 | Stop reason: HOSPADM

## 2023-02-09 RX ADMIN — Medication 10 MG: at 21:58

## 2023-02-09 RX ADMIN — AMIODARONE HYDROCHLORIDE 200 MG: 200 TABLET ORAL at 08:41

## 2023-02-09 RX ADMIN — AMIODARONE HYDROCHLORIDE 200 MG: 200 TABLET ORAL at 21:56

## 2023-02-09 RX ADMIN — CHLORHEXIDINE GLUCONATE 0.12% ORAL RINSE 10 ML: 1.2 LIQUID ORAL at 17:22

## 2023-02-09 RX ADMIN — HEPARIN SODIUM 5000 UNITS: 5000 INJECTION INTRAVENOUS; SUBCUTANEOUS at 15:48

## 2023-02-09 RX ADMIN — FAMOTIDINE 20 MG: 20 TABLET, FILM COATED ORAL at 08:43

## 2023-02-09 RX ADMIN — ACETAMINOPHEN 1000 MG: 500 TABLET ORAL at 07:11

## 2023-02-09 RX ADMIN — SODIUM CHLORIDE, PRESERVATIVE FREE 10 ML: 5 INJECTION INTRAVENOUS at 21:57

## 2023-02-09 RX ADMIN — ACETAZOLAMIDE EXTENDED-RELEASE 500 MG: 500 CAPSULE ORAL at 08:41

## 2023-02-09 RX ADMIN — MIDODRINE HYDROCHLORIDE 10 MG: 5 TABLET ORAL at 17:22

## 2023-02-09 RX ADMIN — AMIODARONE HYDROCHLORIDE 200 MG: 200 TABLET ORAL at 15:48

## 2023-02-09 RX ADMIN — CALCIUM GLUCONATE 1000 MG: 20 INJECTION, SOLUTION INTRAVENOUS at 09:00

## 2023-02-09 RX ADMIN — CLOPIDOGREL BISULFATE 75 MG: 75 TABLET ORAL at 08:41

## 2023-02-09 RX ADMIN — THERA TABS 1 TABLET: TAB at 08:42

## 2023-02-09 RX ADMIN — Medication 2 UNITS: at 12:17

## 2023-02-09 RX ADMIN — ACETAZOLAMIDE EXTENDED-RELEASE 500 MG: 500 CAPSULE ORAL at 15:48

## 2023-02-09 RX ADMIN — BISACODYL 10 MG: 5 TABLET, COATED ORAL at 08:42

## 2023-02-09 RX ADMIN — SODIUM CHLORIDE, PRESERVATIVE FREE 10 ML: 5 INJECTION INTRAVENOUS at 07:11

## 2023-02-09 RX ADMIN — MIDODRINE HYDROCHLORIDE 10 MG: 5 TABLET ORAL at 12:17

## 2023-02-09 RX ADMIN — HEPARIN SODIUM 5000 UNITS: 5000 INJECTION INTRAVENOUS; SUBCUTANEOUS at 08:40

## 2023-02-09 RX ADMIN — Medication 25 UNITS: at 08:40

## 2023-02-09 RX ADMIN — ACETAMINOPHEN 1000 MG: 500 TABLET ORAL at 12:16

## 2023-02-09 RX ADMIN — FUROSEMIDE 20 MG: 10 INJECTION, SOLUTION INTRAMUSCULAR; INTRAVENOUS at 08:42

## 2023-02-09 RX ADMIN — ACETAMINOPHEN 1000 MG: 500 TABLET ORAL at 17:21

## 2023-02-09 RX ADMIN — POTASSIUM CHLORIDE 40 MEQ: 1500 TABLET, EXTENDED RELEASE ORAL at 08:41

## 2023-02-09 RX ADMIN — HEPARIN SODIUM 5000 UNITS: 5000 INJECTION INTRAVENOUS; SUBCUTANEOUS at 22:03

## 2023-02-09 RX ADMIN — MAGNESIUM SULFATE HEPTAHYDRATE 2 G: 40 INJECTION, SOLUTION INTRAVENOUS at 08:43

## 2023-02-09 RX ADMIN — LEVOTHYROXINE SODIUM 100 MCG: 100 TABLET ORAL at 12:17

## 2023-02-09 RX ADMIN — MIDODRINE HYDROCHLORIDE 10 MG: 5 TABLET ORAL at 08:41

## 2023-02-09 RX ADMIN — ATORVASTATIN CALCIUM 20 MG: 20 TABLET, FILM COATED ORAL at 17:21

## 2023-02-09 RX ADMIN — FAMOTIDINE 20 MG: 20 TABLET, FILM COATED ORAL at 17:21

## 2023-02-09 RX ADMIN — CHLORHEXIDINE GLUCONATE 0.12% ORAL RINSE 10 ML: 1.2 LIQUID ORAL at 08:40

## 2023-02-09 RX ADMIN — SODIUM CHLORIDE, PRESERVATIVE FREE 10 ML: 5 INJECTION INTRAVENOUS at 15:48

## 2023-02-09 RX ADMIN — IRON SUCROSE 200 MG: 20 INJECTION, SOLUTION INTRAVENOUS at 08:40

## 2023-02-09 RX ADMIN — CALCIUM GLUCONATE 1000 MG: 20 INJECTION, SOLUTION INTRAVENOUS at 08:43

## 2023-02-09 RX ADMIN — POLYETHYLENE GLYCOL 3350 17 G: 17 POWDER, FOR SOLUTION ORAL at 08:39

## 2023-02-09 RX ADMIN — ASPIRIN 81 MG: 81 TABLET, COATED ORAL at 08:42

## 2023-02-09 NOTE — PROGRESS NOTES
[facility] - Progress Note  Admit Date: 2/3/2023    Assessment:     -s/p CABG 2/6/23 by Dr. Saravanan Dubon, Jennifer Villatoro, SVG-OM, SVG-PDA  -LHC on 02/4/23 with RCA as well as left main disease.  -Echo 2/3/23 with EF 50-55%  -Obesity  -Diabetes  -Hypothyroidism  -Hypertension  -Hyperlipidemia     Primary cardiologist Dr. Sandrine Hoffman:     CABG x3 POD #4  Currently on low-dose of dopamine and Jacob-Synephrine. Blood pressure stable  Also on aspirin, Plavix  Good urine output with IV Lasix yesterday. Now on Diamox by CT surgery team.  On prophylactic amiodarone. Currently in sinus rhythm  Continue routine postop care per CT surgery team  We will follow      Subjective:     Improving dyspnea. Walked in the hallway twice yesterday with assistant    Objective:      Patient Vitals for the past 8 hrs:   Temp Pulse Resp BP SpO2   02/09/23 0851 -- 86 -- (!) 154/73 --   02/09/23 0840 -- 84 -- (!) 145/69 --   02/09/23 0700 -- 92 21 (!) 140/78 97 %   02/09/23 0600 -- -- -- 138/72 --   02/09/23 0500 -- 76 15 114/61 100 %   02/09/23 0400 99.3 °F (37.4 °C) 75 15 126/61 100 %   02/09/23 0300 -- 85 22 (!) 142/75 99 %   02/09/23 0200 -- 93 24 133/68 98 %           Patient Vitals for the past 96 hrs:   Weight   02/09/23 0600 79.3 kg (174 lb 12.8 oz)   02/08/23 0430 80.3 kg (177 lb)   02/07/23 0600 79.8 kg (176 lb)                      Intake/Output Summary (Last 24 hours) at 2/9/2023 0944  Last data filed at 2/9/2023 0700  Gross per 24 hour   Intake 1727.53 ml   Output 6065 ml   Net -4337.47 ml       No apparent distress  Neck:  nontender  Lungs:  decreased, chest tube present  Heart:  regular rate and rhythm, S1, S2 normal, no murmur, click, rub or gallop  Abdomen:  abdomen is soft without significant tenderness,   Extremities: No obvious edema.   Under wrap bandages    Data Review:     Labs: Results:       Chemistry Recent Labs     02/09/23  0525 02/08/23  0500 02/07/23  1500 02/07/23  0420   * 151* 132* 102*    140 142 146*   K 3.7 3.8 3.7 4.2    109 112* 117*   CO2 30 25 24 25   BUN 12 16 15 13   CREA 0.42* 0.60 0.68 0.74   CA 8.4* 8.1* 8.0* 7.9*   MG 1.7 1.8  --  1.9   AGAP 3 6 6 4   BUCR 29* 27* 22* 18        CBC w/Diff Recent Labs     02/09/23  0525 02/08/23  0500 02/07/23  0420   WBC 10.8 14.2* 10.7   RBC 2.77* 2.78* 2.71*   HGB 8.7* 8.8* 8.6*   HCT 26.0* 25.9* 26.1*    184 173   GRANS 76* 81* 85*   LYMPH 13* 9* 6*   EOS 1 0 0        Cardiac Enzymes No results found for: CPK, CK, CKMMB, CKMB, RCK3, CKMBT, CKNDX, CKND1, LOYD, TROPT, TROIQ, BOY, TROPT, TNIPOC, BNP, BNPP   Coagulation Recent Labs     02/06/23  1408   PTP 16.6*   INR 1.3*   APTT 31.0         Lipid Panel Lab Results   Component Value Date/Time    Cholesterol, total 185 02/04/2023 05:45 AM    HDL Cholesterol 40 02/04/2023 05:45 AM    LDL, calculated 108.6 (H) 02/04/2023 05:45 AM    VLDL, calculated 36.4 02/04/2023 05:45 AM    Triglyceride 182 (H) 02/04/2023 05:45 AM    CHOL/HDL Ratio 4.6 02/04/2023 05:45 AM      BNP No results found for: BNP, BNPP, XBNPT   Liver Enzymes No results for input(s): TP, ALB, TBIL, AP in the last 72 hours.     No lab exists for component: SGOT, GPT, DBIL     Digoxin    Thyroid Studies Lab Results   Component Value Date/Time    TSH 1.40 02/04/2023 05:45 AM          Signed By: Piotr Hernandez MD     February 9, 2023

## 2023-02-09 NOTE — PROGRESS NOTES
Problem: Mobility Impaired (Adult and Pediatric)  Goal: *Acute Goals and Plan of Care (Insert Text)  Description: Physical Therapy Goals  Initiated 2/7/2023 and to be accomplished within 7 day(s)  1. Patient will move from supine to sit and sit to supine in bed with modified independence. 2.  Patient will transfer from bed to chair and chair to bed with modified independence using the least restrictive device. 3.  Patient will perform sit to stand with modified independence. 4.  Patient will ambulate with modified independence for 150 feet with the least restrictive device. 5.  Patient will ascend/descend 6 stairs with handrail(s) with modified independence. PLOF: Lives with spouse; sister to stay 24/7 at discharge. Split level home; 6 steps to main bedroom. Has ww, cane, wheelchair, and bedside commode. Outcome: Progressing Towards Goal     PHYSICAL THERAPY TREATMENT    Patient: Ansley Vu (54 y.o. female)  Date: 2/9/2023  Diagnosis: Chest pain, unspecified type [R07.9]  CAD (coronary artery disease), native coronary artery [I25.10] S/P CABG x 3  Procedure(s) (LRB):  CORONARY ARTERY BYPASS GRAFT (CABG) TIMES THREE (N/A) 3 Days Post-Op  Precautions: Skin, Sternal  PLOF: see above     ASSESSMENT:  Pt seen in recliner in NAD and agreeable, on 4L via NC and able to maintain >90% with activity. Pt educated on sternal precautions and was able to utilize heart hugger properly during transfers. Pt tolerated approx 300 ft of gait with rollator and SBA, intermittent standing rest breaks for recovery given, no LOB and safe management of walker noted. At end of session, pt positioned for comfort back in recliner under nursing care. Will continue to follow during acute admission.    Progression toward goals:   [x]      Improving appropriately and progressing toward goals  []      Improving slowly and progressing toward goals  []      Not making progress toward goals and plan of care will be adjusted PLAN:  Patient continues to benefit from skilled intervention to address the above impairments. Continue treatment per established plan of care. Further Equipment Recommendations for Discharge:  rolling walker    AMPAC: 18/24  Current research shows that an AM-PAC score of 18 (14 without stairs) or greater is associated with a discharge to the patient's home setting. Based on an AM-PAC score of 18/24 (or **/20 if omitting stairs) and their current functional mobility deficits, it is recommended that the patient have 2-3 sessions per week of Physical Therapy at d/c to increase the patient's independence. This AMPAC score should be considered in conjunction with interdisciplinary team recommendations to determine the most appropriate discharge setting. Patient's social support, diagnosis, medical stability, and prior level of function should also be taken into consideration. SUBJECTIVE:   Patient stated I have a good family.     OBJECTIVE DATA SUMMARY:   Critical Behavior:  Neurologic State: Alert  Orientation Level: Oriented X4  Cognition: Follows commands     Functional Mobility Training:  Bed Mobility:  NT          Transfers:  Sit to Stand: Contact guard assistance  Stand to Sit: Contact guard assistance          Balance:  Sitting: Impaired  Sitting - Static: Good (unsupported)  Sitting - Dynamic: Fair (occasional)  Standing: Impaired; With support  Standing - Static: Good  Standing - Dynamic : Fair     Ambulation/Gait Training:  Distance (ft): 300 Feet (ft)  Assistive Device: Walker, rollator  Ambulation - Level of Assistance: Stand-by assistance        Gait Abnormalities: Decreased step clearance        Base of Support: Widened     Speed/Sarah: Pace decreased (<100 feet/min)  Step Length: Right shortened;Left shortened    Pain:  Pain level pre-treatment: 0/10  Pain level post-treatment: 0/10   Pain Intervention(s): Medication (see MAR);  Rest, Ice, Repositioning   Response to intervention: Nurse notified    Activity Tolerance:   Good    Please refer to the flowsheet for vital signs taken during this treatment. After treatment:   [x] Patient left in no apparent distress sitting up in chair  [] Patient left in no apparent distress in bed  [x] Call bell left within reach  [x] Nursing notified  [] Caregiver present  [] Bed alarm activated  [] SCDs applied      COMMUNICATION/EDUCATION:   [x]         Role of Physical Therapy in the acute care setting. [x]         Fall prevention education was provided and the patient/caregiver indicated understanding. [x]         Patient/family have participated as able in working toward goals and plan of care. [x]         Patient/family agree to work toward stated goals and plan of care. []         Patient understands intent and goals of therapy, but is neutral about his/her participation. []         Patient is unable to participate in stated goals/plan of care: ongoing with therapy staff.  []         Other:        Stuart York   Time Calculation: 25 mins    [address] [facility] [address] [address] AM-PAC® Basic Mobility Inpatient Short Form (6-Clicks) Version 2    How much HELP from another person does the patient currently need    (If the patient hasn't done an activity recently, how much help from another person do you think he/she would need if he/she tried?)   Total (Total A or Dep)   A Lot  (Mod to Max A)   A Little (Sup or Min A)   None (Mod I to I)   Turning from your back to your side while in a flat bed without using bedrails? [] 1 [] 2 [x] 3 [] 4   2. Moving from lying on your back to sitting on the side of a flat bed without using bedrails? [] 1 [] 2 [x] 3 [] 4   3. Moving to and from a bed to a chair (including a wheelchair)? [] 1 [] 2 [x] 3 [] 4   4. Standing up from a chair using your arms (e.g., wheelchair, or bedside chair)? [] 1 [] 2 [x] 3 [] 4   5. Walking in hospital room? [] 1 [] 2 [x] 3 [] 4   6.  Climbing 3-5 steps with a railing?+   [] 1 [] 2 [x] 3 [] 4   +If stair climbing cannot be assessed, skip item #6. Sum responses from items 1-5. Current research shows that an AM-PAC score of 18 (14 without stairs) or greater is associated with a discharge to the patient's home setting. Based on an AM-PAC score of 18/24 (or **/20 if omitting stairs) and their current functional mobility deficits, it is recommended that the patient have 2-3 sessions per week of Physical Therapy at d/c to increase the patient's independence.

## 2023-02-09 NOTE — PROGRESS NOTES
Chart reviewed. PTMACARIO recommending home health. CM will continue to follow along.           NIKKI CastellonN RN  Care Management  Pager: [phone]

## 2023-02-09 NOTE — PROGRESS NOTES
Problem: Self Care Deficits Care Plan (Adult)  Goal: *Acute Goals and Plan of Care (Insert Text)  Description: Occupational Therapy Goals  Initiated 2/7/2023 within 7 day(s). 1.  Patient will perform upper body dressing with modified independence. 2.  Patient will perform lower body dressing with supervision/set-up with AE as needed. 3.  Patient will perform functional bed mobility in prep for self care with supervision/set-up. 4.  Patient will perform toilet transfers with supervision/set-up. 5.  Patient will perform all aspects of toileting with supervision/set-up. 6.  Patient will participate in upper extremity therapeutic exercise/activities with supervision/set-up for 8 minutes. 7.  Patient will utilize energy conservation techniques/sternal precautions during functional activities with verbal cues. Prior Level of Function:Pt reports independence     Outcome: Progressing Towards Goal   OCCUPATIONAL THERAPY TREATMENT    Patient: Jm Allen (82 y.o. female)  Date: 2/9/2023  Diagnosis: Chest pain, unspecified type [R07.9]  CAD (coronary artery disease), native coronary artery [I25.10] S/P CABG x 3  Procedure(s) (LRB):  CORONARY ARTERY BYPASS GRAFT (CABG) TIMES THREE (N/A) 3 Days Post-Op  Precautions: Skin, Sternal  PLOF: Pt reports independence    Chart, occupational therapy assessment, plan of care, and goals were reviewed. ASSESSMENT:  Pt cleared by RN for OT tx at this time. PT co tx to maximize pt safety and participation. Pt presented sitting in reclining chair upon entry, on 4L O2NC, and agreeable for participation. Reviewed sternal precautions with pt recalling 2/3. STS transfer CGA with Rollator and maneuvered to sink. Pt performed oral care with SBA, no LOB noted. Once finished, she maneuvered in room and hallway SBA/CGA with Rollator to improve functional activity tolerance for ADL carryover. Once back in room sat in reclining chair for seated break.  She required MIN A doffing/donning her heart hugger and donning clean gown. Pt then requesting to use BSC. She performed SPT to [facility] w/ CGA. SBA for toileting ADL while seated on BSC. She returned back to sitting in reclining chair at end of session with all needs within reach and RN present. Progression toward goals:  []          Improving appropriately and progressing toward goals  [x]          Improving slowly and progressing toward goals  []          Not making progress toward goals and plan of care will be adjusted     PLAN:  Patient continues to benefit from skilled intervention to address the above impairments. Continue treatment per established plan of care. Further Equipment Recommendations for Discharge:  Shower chair, RW    AMPAC: Current research shows that an AM-PAC score of 18 or greater is associated with a discharge to the patient's home setting. Based on an AM-PAC score of 18/24 and their current ADL deficits; it is recommended that the patient have 2-3 sessions per week of Occupational Therapy at d/c to increase the patient's independence. This AMPAC score should be considered in conjunction with interdisciplinary team recommendations to determine the most appropriate discharge setting. Patient's social support, diagnosis, medical stability, and prior level of function should also be taken into consideration. SUBJECTIVE:   Patient stated Can I brush my teeth?     OBJECTIVE DATA SUMMARY:   Cognitive/Behavioral Status:  Neurologic State: Alert  Orientation Level: Oriented X4  Cognition: Follows commands       Functional Mobility and Transfers for ADLs:      Transfers:  Sit to Stand: Contact guard assistance  Stand to Sit: Contact guard assistance      Toilet Transfer : Contact guard assistance (SPT to [facility])              Balance:  Sitting: Impaired  Sitting - Static: Good (unsupported)  Sitting - Dynamic: Fair (occasional)  Standing: Impaired; With support  Standing - Static: Good  Standing - Dynamic : Fair    ADL Intervention:       Grooming  Position Performed: Standing  Brushing Teeth: Stand-by assistance      Upper Body Dressing Assistance  Orthotics(Brace): Minimum assistance (donning/doffing heart hugger)  Hospital Gown: Minimum  assistance         Toileting  Bladder Hygiene: Stand-by assistance (while seated)      Pain:  Pain level pre-treatment: 0/10   Pain level post-treatment: 0/10      Activity Tolerance:    Fair   Please refer to the flowsheet for vital signs taken during this treatment. After treatment:   [x]  Patient left in no apparent distress sitting up in chair  []  Patient left in no apparent distress in bed  [x]  Call bell left within reach  [x]  Nursing notified  []  Caregiver present  []  Bed alarm activated    COMMUNICATION/EDUCATION:   [x] Role of Occupational Therapy in the acute care setting  [x] Home safety education was provided and the patient/caregiver indicated understanding. [x] Patient/family have participated as able in working towards goals and plan of care. [x] Patient/family agree to work toward stated goals and plan of care. [] Patient understands intent and goals of therapy, but is neutral about his/her participation. [] Patient is unable to participate in goal setting and plan of care. Thank you for this referral.  MARIAMA Sampson  Time Calculation: 38 mins    [place] AM-PAC® Daily Activity Inpatient Short Form (6-Clicks)    How much HELP from another person does the patient currently need    (If the patient hasn't done an activity recently, how much help from another person do you think he/she would need if he/she tried?)   Total (Total A or Dep)   A Lot  (Mod to Max A)   A Little (Sup or Min A)   None (Mod I to I)   Putting on and taking off regular lower body clothing? [] 1 [x] 2 [] 3 [] 4   2. Bathing (including washing, rinsing,      drying)? [] 1 [x] 2 [] 3 [] 4   3.  Toileting, which includes using toilet, bedpan or urinal?   [] 1 [] 2 [x] 3 [] 4 4. Putting on and taking off regular upper body clothing? [] 1 [] 2 [x] 3 [] 4   5. Taking care of personal grooming such as brushing teeth? [] 1 [] 2 [] 3 [x] 4   6. Eating meals?    [] 1 [] 2 [] 3 [x] 4

## 2023-02-09 NOTE — PROGRESS NOTES
CARDIAC SURGERY PROGRESS NOTE    2023  7:05 AM     CC:  Post Operative Day # 3     Chart, images and labs reviewed. Discussed with available staff. Interval History/Events of Past 24 hours:    Large UO after lasix, creat normal.  Tubes still draining    Subjective:  Patient seen and examined on rounds today. Walked x 2, no dizziness or lightheadedness. Dopa 2 and shannon 30    Objective:  Vital signs:   Visit Vitals  /61   Pulse 76   Temp 99.3 °F (37.4 °C)   Resp 15   Ht 5' 6\" (1.676 m)   Wt 80.3 kg (177 lb)   SpO2 100%   Breastfeeding No   BMI 28.57 kg/m²     Temp (24hrs), Av.4 °F (36.9 °C), Min:97.9 °F (36.6 °C), Max:99.3 °F (37.4 °C)    Admission Weight: Last Weight   Weight: 72.6 kg (160 lb) Weight: 80.3 kg (177 lb)     Telemetry: SR 82    Physical Examination:     General:  Alert, oriented   Lungs: Clear to auscultation without rales, wheezes or rhonchi. Chest: Dressings clean and dry. Midline incision healing well. Sternum stable. Heart: regular rate and rhythm, No murmur. Abdomen: Soft and non-tender without masses. Bowel sounds present. Extremities: Warm and well perfused. Edema moderate. Incisions: clean, dry, no drainage. Neuro: No deficit. Labs:  Lab Results   Component Value Date/Time    WBC 10.8 2023 05:25 AM    HCT 26.0 (L) 2023 05:25 AM     2023 05:25 AM      Lab Results   Component Value Date/Time     2023 05:25 AM    K 3.7 2023 05:25 AM     2023 05:25 AM    CO2 30 2023 05:25 AM     (H) 2023 05:25 AM    BUN 12 2023 05:25 AM    CREA 0.42 (L) 2023 05:25 AM    CREA 0.60 2023 05:00 AM    CREA 0.68 2023 03:00 PM     CXR: left effusion, no ptx     Assessment:  S/P  CABG x 3  Respiratory parameters stable  Cardiac status stable     Plans:  1. Lasix and diamox  2. Hold BB and cont midodrine. Cont ASA, statin and amio ppx. Dopa to 1, shannon for MAP >75-80  3.   Cont tubes until they taper  4.  michael Bartholomew Doctor, VIVIANA    PLEASE NOTE:  This document has been produced using voice recognition software. Unrecognized errors in transcription may be present. NOTE TO PATIENT:  The purpose of this note is to communicate optimally with the other providers involved in your care. It is written using standard medical terminology. If you have questions regarding details of the note please call my office at [phone] and make an appointment to discuss your concerns.

## 2023-02-09 NOTE — PROGRESS NOTES
1900- Change of shift. Report received by Nils Jay RN. Patient in bed and resting with no complaints of post op pain. Patient complains of fibromyalgia pain but states \"I do not need medication, I do not take any at home. \"   Wound Prevention Checklist  Patient: Sidney Shah (69 y.o. female)  Date: 2/8/2023  Diagnosis: Chest pain, unspecified type [R07.9]  CAD (coronary artery disease), native coronary artery [I25.10] S/P CABG x 3  Precautions: Skin, Sternal  []  Heel prevention boots placed on patient  []  Patient turned q2h during shift  []  Lift team ordered  [x]  Patient on Holt bed/Specialty bed  [x]  Each Wound is documented during shift (Stage, Color, drainage, odor, measurements, and dressings)  [x]  Dual skin check done with [employer], [address] [place] [address], [facility] [address]- Drips verified. 4 chest tubes connected to 2 atriums on constant wall suction noted. No air leak noted. CVL to RIJ noted. Patient has no requests at this time. 2232- Patient medicated for complaints of pain. 2246- Patient requested melatonin. 0000- No assessment changes. No complaints of post op pain. Patient complains of fibromyalgia pain and states, \"I just need more pillow, no pain medication. \" Pillows given upon request. Patient bathed with CHG. 0400- No assessment changes. No complaints of post op pain. Patient complains of fibromyalgia pain. 0600- Patient weighted and ambulated to chair. 0730- Change of shift. Report given to Nils Jay RN.

## 2023-02-10 ENCOUNTER — APPOINTMENT (OUTPATIENT)
Dept: GENERAL RADIOLOGY | Age: 68
DRG: 948 | End: 2023-02-10
Attending: PHYSICIAN ASSISTANT
Payer: MEDICARE

## 2023-02-10 VITALS
WEIGHT: 166 LBS | HEART RATE: 74 BPM | OXYGEN SATURATION: 96 % | SYSTOLIC BLOOD PRESSURE: 109 MMHG | BODY MASS INDEX: 26.68 KG/M2 | RESPIRATION RATE: 18 BRPM | TEMPERATURE: 98.6 F | HEIGHT: 66 IN | DIASTOLIC BLOOD PRESSURE: 57 MMHG

## 2023-02-10 LAB
ANION GAP SERPL CALC-SCNC: 8 MMOL/L (ref 3–18)
APPEARANCE UR: ABNORMAL
BACTERIA URNS QL MICRO: ABNORMAL /HPF
BASOPHILS # BLD: 0 K/UL (ref 0–0.1)
BASOPHILS NFR BLD: 0 % (ref 0–2)
BILIRUB UR QL: NEGATIVE
BUN SERPL-MCNC: 13 MG/DL (ref 7–18)
BUN/CREAT SERPL: 26 (ref 12–20)
CALCIUM SERPL-MCNC: 8.1 MG/DL (ref 8.5–10.1)
CHLORIDE SERPL-SCNC: 112 MMOL/L (ref 100–111)
CO2 SERPL-SCNC: 23 MMOL/L (ref 21–32)
COLOR UR: YELLOW
CREAT SERPL-MCNC: 0.5 MG/DL (ref 0.6–1.3)
DIFFERENTIAL METHOD BLD: ABNORMAL
EOSINOPHIL # BLD: 0.2 K/UL (ref 0–0.4)
EOSINOPHIL NFR BLD: 2 % (ref 0–5)
EPITH CASTS URNS QL MICRO: ABNORMAL /LPF (ref 0–5)
ERYTHROCYTE [DISTWIDTH] IN BLOOD BY AUTOMATED COUNT: 13.3 % (ref 11.6–14.5)
GLUCOSE BLD STRIP.AUTO-MCNC: 104 MG/DL (ref 70–110)
GLUCOSE BLD STRIP.AUTO-MCNC: 126 MG/DL (ref 70–110)
GLUCOSE BLD STRIP.AUTO-MCNC: 191 MG/DL (ref 70–110)
GLUCOSE BLD STRIP.AUTO-MCNC: 86 MG/DL (ref 70–110)
GLUCOSE SERPL-MCNC: 79 MG/DL (ref 74–99)
GLUCOSE UR STRIP.AUTO-MCNC: NEGATIVE MG/DL
HCT VFR BLD AUTO: 29.4 % (ref 35–45)
HGB BLD-MCNC: 9.5 G/DL (ref 12–16)
HGB UR QL STRIP: ABNORMAL
IMM GRANULOCYTES # BLD AUTO: 0.2 K/UL (ref 0–0.04)
IMM GRANULOCYTES NFR BLD AUTO: 2 % (ref 0–0.5)
KETONES UR QL STRIP.AUTO: NEGATIVE MG/DL
LEUKOCYTE ESTERASE UR QL STRIP.AUTO: ABNORMAL
LYMPHOCYTES # BLD: 1.9 K/UL (ref 0.9–3.6)
LYMPHOCYTES NFR BLD: 18 % (ref 21–52)
MAGNESIUM SERPL-MCNC: 1.9 MG/DL (ref 1.6–2.6)
MCH RBC QN AUTO: 31.3 PG (ref 24–34)
MCHC RBC AUTO-ENTMCNC: 32.3 G/DL (ref 31–37)
MCV RBC AUTO: 96.7 FL (ref 78–100)
MONOCYTES # BLD: 1.1 K/UL (ref 0.05–1.2)
MONOCYTES NFR BLD: 10 % (ref 3–10)
NEUTS SEG # BLD: 7.5 K/UL (ref 1.8–8)
NEUTS SEG NFR BLD: 69 % (ref 40–73)
NITRITE UR QL STRIP.AUTO: NEGATIVE
NRBC # BLD: 0.03 K/UL (ref 0–0.01)
NRBC BLD-RTO: 0.3 PER 100 WBC
PH UR STRIP: 7.5 (ref 5–8)
PLATELET # BLD AUTO: 275 K/UL (ref 135–420)
PMV BLD AUTO: 10.5 FL (ref 9.2–11.8)
POTASSIUM SERPL-SCNC: 3.1 MMOL/L (ref 3.5–5.5)
PROT UR STRIP-MCNC: NEGATIVE MG/DL
RBC # BLD AUTO: 3.04 M/UL (ref 4.2–5.3)
RBC #/AREA URNS HPF: ABNORMAL /HPF (ref 0–5)
SODIUM SERPL-SCNC: 143 MMOL/L (ref 136–145)
SP GR UR REFRACTOMETRY: 1.01 (ref 1–1.03)
UROBILINOGEN UR QL STRIP.AUTO: 1 EU/DL (ref 0.2–1)
WBC # BLD AUTO: 11 K/UL (ref 4.6–13.2)
WBC URNS QL MICRO: ABNORMAL /HPF (ref 0–4)

## 2023-02-10 PROCEDURE — 97535 SELF CARE MNGMENT TRAINING: CPT

## 2023-02-10 PROCEDURE — 9990 CHARGE CONVERSION

## 2023-02-10 PROCEDURE — 80048 BASIC METABOLIC PNL TOTAL CA: CPT

## 2023-02-10 PROCEDURE — 74011250636 HC RX REV CODE- 250/636: Performed by: PHYSICIAN ASSISTANT

## 2023-02-10 PROCEDURE — 85025 COMPLETE CBC W/AUTO DIFF WBC: CPT

## 2023-02-10 PROCEDURE — 71045 X-RAY EXAM CHEST 1 VIEW: CPT

## 2023-02-10 PROCEDURE — 2709999900 HC NON-CHARGEABLE SUPPLY

## 2023-02-10 PROCEDURE — 74011636637 HC RX REV CODE- 636/637: Performed by: PHYSICIAN ASSISTANT

## 2023-02-10 PROCEDURE — 94762 N-INVAS EAR/PLS OXIMTRY CONT: CPT

## 2023-02-10 PROCEDURE — 81001 URINALYSIS AUTO W/SCOPE: CPT

## 2023-02-10 PROCEDURE — 97116 GAIT TRAINING THERAPY: CPT

## 2023-02-10 PROCEDURE — 99024 POSTOP FOLLOW-UP VISIT: CPT | Performed by: PHYSICIAN ASSISTANT

## 2023-02-10 PROCEDURE — 82962 GLUCOSE BLOOD TEST: CPT

## 2023-02-10 PROCEDURE — 83735 ASSAY OF MAGNESIUM: CPT

## 2023-02-10 PROCEDURE — 97530 THERAPEUTIC ACTIVITIES: CPT

## 2023-02-10 PROCEDURE — 65610000006 HC RM INTENSIVE CARE

## 2023-02-10 PROCEDURE — 74011000250 HC RX REV CODE- 250: Performed by: THORACIC SURGERY (CARDIOTHORACIC VASCULAR SURGERY)

## 2023-02-10 PROCEDURE — 65270000029 HC RM PRIVATE

## 2023-02-10 PROCEDURE — APPNB45 APP NON BILLABLE 31-45 MINUTES: Performed by: PHYSICIAN ASSISTANT

## 2023-02-10 PROCEDURE — 74011250637 HC RX REV CODE- 250/637: Performed by: PHYSICIAN ASSISTANT

## 2023-02-10 PROCEDURE — 74011250636 HC RX REV CODE- 250/636: Performed by: THORACIC SURGERY (CARDIOTHORACIC VASCULAR SURGERY)

## 2023-02-10 PROCEDURE — 74011250637 HC RX REV CODE- 250/637: Performed by: THORACIC SURGERY (CARDIOTHORACIC VASCULAR SURGERY)

## 2023-02-10 PROCEDURE — 99232 SBSQ HOSP IP/OBS MODERATE 35: CPT | Performed by: INTERNAL MEDICINE

## 2023-02-10 PROCEDURE — 74011000250 HC RX REV CODE- 250: Performed by: PHYSICIAN ASSISTANT

## 2023-02-10 RX ORDER — MORPHINE SULFATE 2 MG/ML
2 INJECTION, SOLUTION INTRAMUSCULAR; INTRAVENOUS
Status: DISCONTINUED | OUTPATIENT
Start: 2023-02-11 | End: 2023-02-12

## 2023-02-10 RX ORDER — ONDANSETRON 2 MG/ML
4 INJECTION INTRAMUSCULAR; INTRAVENOUS EVERY 4 HOURS PRN
Status: DISCONTINUED | OUTPATIENT
Start: 2023-02-11 | End: 2023-02-13 | Stop reason: HOSPADM

## 2023-02-10 RX ORDER — NALOXONE HYDROCHLORIDE 0.4 MG/ML
0.4 INJECTION, SOLUTION INTRAMUSCULAR; INTRAVENOUS; SUBCUTANEOUS PRN
Status: DISCONTINUED | OUTPATIENT
Start: 2023-02-11 | End: 2023-02-13 | Stop reason: HOSPADM

## 2023-02-10 RX ORDER — POTASSIUM CHLORIDE 20 MEQ/1
40 TABLET, EXTENDED RELEASE ORAL EVERY 8 HOURS
Status: COMPLETED | OUTPATIENT
Start: 2023-02-10 | End: 2023-02-10

## 2023-02-10 RX ORDER — AMIODARONE HYDROCHLORIDE 200 MG/1
200 TABLET ORAL 2 TIMES DAILY
Status: DISCONTINUED | OUTPATIENT
Start: 2023-02-11 | End: 2023-02-13 | Stop reason: HOSPADM

## 2023-02-10 RX ORDER — FAMOTIDINE 20 MG/1
20 TABLET, FILM COATED ORAL 2 TIMES DAILY
Status: DISCONTINUED | OUTPATIENT
Start: 2023-02-11 | End: 2023-02-13 | Stop reason: HOSPADM

## 2023-02-10 RX ORDER — INSULIN LISPRO 100 [IU]/ML
0-10 INJECTION, SOLUTION INTRAVENOUS; SUBCUTANEOUS
Status: DISCONTINUED | OUTPATIENT
Start: 2023-02-11 | End: 2023-02-13 | Stop reason: HOSPADM

## 2023-02-10 RX ORDER — TRAMADOL HYDROCHLORIDE 50 MG/1
100 TABLET ORAL EVERY 6 HOURS PRN
Status: DISCONTINUED | OUTPATIENT
Start: 2023-02-11 | End: 2023-02-13 | Stop reason: HOSPADM

## 2023-02-10 RX ORDER — CLOPIDOGREL BISULFATE 75 MG/1
75 TABLET ORAL DAILY
Status: DISCONTINUED | OUTPATIENT
Start: 2023-02-11 | End: 2023-02-13 | Stop reason: HOSPADM

## 2023-02-10 RX ORDER — CHLORHEXIDINE GLUCONATE 0.12 MG/ML
15 RINSE ORAL 2 TIMES DAILY
Status: DISCONTINUED | OUTPATIENT
Start: 2023-02-11 | End: 2023-02-13 | Stop reason: HOSPADM

## 2023-02-10 RX ORDER — ALBUTEROL SULFATE 2.5 MG/3ML
2.5 SOLUTION RESPIRATORY (INHALATION) EVERY 4 HOURS PRN
Status: DISCONTINUED | OUTPATIENT
Start: 2023-02-11 | End: 2023-02-13 | Stop reason: HOSPADM

## 2023-02-10 RX ORDER — SODIUM CHLORIDE 0.9 % (FLUSH) 0.9 %
5-40 SYRINGE (ML) INJECTION PRN
Status: DISCONTINUED | OUTPATIENT
Start: 2023-02-11 | End: 2023-02-13 | Stop reason: HOSPADM

## 2023-02-10 RX ORDER — MAGNESIUM SULFATE IN WATER 40 MG/ML
2000 INJECTION, SOLUTION INTRAVENOUS PRN
Status: DISCONTINUED | OUTPATIENT
Start: 2023-02-11 | End: 2023-02-13 | Stop reason: HOSPADM

## 2023-02-10 RX ORDER — TRAMADOL HYDROCHLORIDE 50 MG/1
50 TABLET ORAL EVERY 6 HOURS PRN
Status: DISCONTINUED | OUTPATIENT
Start: 2023-02-11 | End: 2023-02-13 | Stop reason: HOSPADM

## 2023-02-10 RX ORDER — MECOBALAMIN 5000 MCG
10 TABLET,DISINTEGRATING ORAL NIGHTLY PRN
Status: DISCONTINUED | OUTPATIENT
Start: 2023-02-11 | End: 2023-02-13 | Stop reason: HOSPADM

## 2023-02-10 RX ORDER — SODIUM CHLORIDE 9 MG/ML
INJECTION, SOLUTION INTRAVENOUS CONTINUOUS
Status: DISCONTINUED | OUTPATIENT
Start: 2023-02-11 | End: 2023-02-13 | Stop reason: HOSPADM

## 2023-02-10 RX ORDER — INSULIN GLARGINE 100 [IU]/ML
25 INJECTION, SOLUTION SUBCUTANEOUS DAILY
Status: DISCONTINUED | OUTPATIENT
Start: 2023-02-11 | End: 2023-02-13 | Stop reason: HOSPADM

## 2023-02-10 RX ORDER — LEVOTHYROXINE SODIUM 0.1 MG/1
100 TABLET ORAL
Status: DISCONTINUED | OUTPATIENT
Start: 2023-02-11 | End: 2023-02-13 | Stop reason: HOSPADM

## 2023-02-10 RX ORDER — PHENYLEPHRINE HCL IN 0.9% NACL 50MG/250ML
10-100 PLASTIC BAG, INJECTION (ML) INTRAVENOUS CONTINUOUS
Status: DISCONTINUED | OUTPATIENT
Start: 2023-02-11 | End: 2023-02-13 | Stop reason: HOSPADM

## 2023-02-10 RX ORDER — POTASSIUM CHLORIDE 29.8 MG/ML
20 INJECTION INTRAVENOUS PRN
Status: DISCONTINUED | OUTPATIENT
Start: 2023-02-11 | End: 2023-02-12

## 2023-02-10 RX ORDER — POTASSIUM CHLORIDE 7.45 MG/ML
10 INJECTION INTRAVENOUS PRN
Status: DISCONTINUED | OUTPATIENT
Start: 2023-02-10 | End: 2023-02-10

## 2023-02-10 RX ORDER — MIDODRINE HYDROCHLORIDE 5 MG/1
10 TABLET ORAL
Status: DISCONTINUED | OUTPATIENT
Start: 2023-02-11 | End: 2023-02-13

## 2023-02-10 RX ORDER — POLYETHYLENE GLYCOL 3350 17 G/17G
17 POWDER, FOR SOLUTION ORAL DAILY
Status: DISCONTINUED | OUTPATIENT
Start: 2023-02-11 | End: 2023-02-13 | Stop reason: HOSPADM

## 2023-02-10 RX ORDER — SODIUM CHLORIDE 0.9 % (FLUSH) 0.9 %
5-40 SYRINGE (ML) INJECTION EVERY 8 HOURS
Status: DISCONTINUED | OUTPATIENT
Start: 2023-02-11 | End: 2023-02-13 | Stop reason: HOSPADM

## 2023-02-10 RX ORDER — HEPARIN SODIUM 5000 [USP'U]/ML
5000 INJECTION, SOLUTION INTRAVENOUS; SUBCUTANEOUS EVERY 8 HOURS SCHEDULED
Status: DISCONTINUED | OUTPATIENT
Start: 2023-02-11 | End: 2023-02-13 | Stop reason: HOSPADM

## 2023-02-10 RX ORDER — BISACODYL 10 MG
10 SUPPOSITORY, RECTAL RECTAL DAILY PRN
Status: DISCONTINUED | OUTPATIENT
Start: 2023-02-11 | End: 2023-02-13 | Stop reason: HOSPADM

## 2023-02-10 RX ORDER — ACETAMINOPHEN 325 MG/1
650 TABLET ORAL EVERY 4 HOURS PRN
Status: DISCONTINUED | OUTPATIENT
Start: 2023-02-10 | End: 2023-02-13 | Stop reason: HOSPADM

## 2023-02-10 RX ORDER — POTASSIUM CHLORIDE 20 MEQ/1
40 TABLET, EXTENDED RELEASE ORAL PRN
Status: DISCONTINUED | OUTPATIENT
Start: 2023-02-10 | End: 2023-02-10

## 2023-02-10 RX ORDER — POTASSIUM CHLORIDE 7.45 MG/ML
10 INJECTION INTRAVENOUS PRN
Status: DISCONTINUED | OUTPATIENT
Start: 2023-02-11 | End: 2023-02-12

## 2023-02-10 RX ORDER — ATORVASTATIN CALCIUM 20 MG/1
20 TABLET, FILM COATED ORAL NIGHTLY
Status: DISCONTINUED | OUTPATIENT
Start: 2023-02-11 | End: 2023-02-13 | Stop reason: HOSPADM

## 2023-02-10 RX ORDER — POTASSIUM CHLORIDE 29.8 MG/ML
20 INJECTION INTRAVENOUS
Status: DISCONTINUED | OUTPATIENT
Start: 2023-02-10 | End: 2023-02-10

## 2023-02-10 RX ORDER — MAGNESIUM SULFATE HEPTAHYDRATE 40 MG/ML
2 INJECTION, SOLUTION INTRAVENOUS ONCE
Status: COMPLETED | OUTPATIENT
Start: 2023-02-10 | End: 2023-02-10

## 2023-02-10 RX ORDER — MULTIVITAMIN WITH IRON
1 TABLET ORAL DAILY
Status: DISCONTINUED | OUTPATIENT
Start: 2023-02-11 | End: 2023-02-13 | Stop reason: HOSPADM

## 2023-02-10 RX ORDER — MORPHINE SULFATE 4 MG/ML
4 INJECTION, SOLUTION INTRAMUSCULAR; INTRAVENOUS
Status: DISCONTINUED | OUTPATIENT
Start: 2023-02-11 | End: 2023-02-12

## 2023-02-10 RX ORDER — AMIODARONE HYDROCHLORIDE 200 MG/1
200 TABLET ORAL 2 TIMES DAILY
Status: DISCONTINUED | OUTPATIENT
Start: 2023-02-10 | End: 2023-02-11 | Stop reason: HOSPADM

## 2023-02-10 RX ORDER — ASPIRIN 81 MG/1
81 TABLET ORAL DAILY
Status: DISCONTINUED | OUTPATIENT
Start: 2023-02-11 | End: 2023-02-13 | Stop reason: HOSPADM

## 2023-02-10 RX ORDER — DOPAMINE HYDROCHLORIDE 160 MG/100ML
1 INJECTION, SOLUTION INTRAVENOUS CONTINUOUS
Status: DISCONTINUED | OUTPATIENT
Start: 2023-02-11 | End: 2023-02-11

## 2023-02-10 RX ORDER — ACETAMINOPHEN 500 MG
1000 TABLET ORAL EVERY 6 HOURS
Status: DISCONTINUED | OUTPATIENT
Start: 2023-02-11 | End: 2023-02-13 | Stop reason: HOSPADM

## 2023-02-10 RX ADMIN — SODIUM CHLORIDE, PRESERVATIVE FREE 10 ML: 5 INJECTION INTRAVENOUS at 18:44

## 2023-02-10 RX ADMIN — AMIODARONE HYDROCHLORIDE 200 MG: 200 TABLET ORAL at 09:30

## 2023-02-10 RX ADMIN — PHENYLEPHRINE HYDROCHLORIDE 20 MCG/MIN: 10 INJECTION INTRAVENOUS at 19:30

## 2023-02-10 RX ADMIN — HEPARIN SODIUM 5000 UNITS: 5000 INJECTION INTRAVENOUS; SUBCUTANEOUS at 18:43

## 2023-02-10 RX ADMIN — CHLORHEXIDINE GLUCONATE 0.12% ORAL RINSE 10 ML: 1.2 LIQUID ORAL at 09:27

## 2023-02-10 RX ADMIN — Medication 2 UNITS: at 12:28

## 2023-02-10 RX ADMIN — POTASSIUM CHLORIDE 40 MEQ: 1500 TABLET, EXTENDED RELEASE ORAL at 18:47

## 2023-02-10 RX ADMIN — HEPARIN SODIUM 5000 UNITS: 5000 INJECTION INTRAVENOUS; SUBCUTANEOUS at 22:24

## 2023-02-10 RX ADMIN — HEPARIN SODIUM 5000 UNITS: 5000 INJECTION INTRAVENOUS; SUBCUTANEOUS at 06:39

## 2023-02-10 RX ADMIN — SODIUM CHLORIDE, PRESERVATIVE FREE 10 ML: 5 INJECTION INTRAVENOUS at 05:22

## 2023-02-10 RX ADMIN — MIDODRINE HYDROCHLORIDE 10 MG: 5 TABLET ORAL at 12:28

## 2023-02-10 RX ADMIN — LEVOTHYROXINE SODIUM 100 MCG: 100 TABLET ORAL at 09:29

## 2023-02-10 RX ADMIN — MAGNESIUM SULFATE HEPTAHYDRATE 2 G: 40 INJECTION, SOLUTION INTRAVENOUS at 09:26

## 2023-02-10 RX ADMIN — CHLORHEXIDINE GLUCONATE 0.12% ORAL RINSE 10 ML: 1.2 LIQUID ORAL at 18:43

## 2023-02-10 RX ADMIN — IRON SUCROSE 200 MG: 20 INJECTION, SOLUTION INTRAVENOUS at 09:28

## 2023-02-10 RX ADMIN — FAMOTIDINE 20 MG: 20 TABLET, FILM COATED ORAL at 09:30

## 2023-02-10 RX ADMIN — ACETAMINOPHEN 1000 MG: 500 TABLET ORAL at 05:21

## 2023-02-10 RX ADMIN — ACETAMINOPHEN 1000 MG: 500 TABLET ORAL at 12:28

## 2023-02-10 RX ADMIN — POTASSIUM CHLORIDE 40 MEQ: 1500 TABLET, EXTENDED RELEASE ORAL at 09:29

## 2023-02-10 RX ADMIN — THERA TABS 1 TABLET: TAB at 09:29

## 2023-02-10 RX ADMIN — FAMOTIDINE 20 MG: 20 TABLET, FILM COATED ORAL at 18:00

## 2023-02-10 RX ADMIN — CLOPIDOGREL BISULFATE 75 MG: 75 TABLET ORAL at 09:28

## 2023-02-10 RX ADMIN — MIDODRINE HYDROCHLORIDE 10 MG: 5 TABLET ORAL at 18:42

## 2023-02-10 RX ADMIN — ACETAMINOPHEN 1000 MG: 500 TABLET ORAL at 18:42

## 2023-02-10 RX ADMIN — Medication 25 UNITS: at 09:28

## 2023-02-10 RX ADMIN — ATORVASTATIN CALCIUM 20 MG: 20 TABLET, FILM COATED ORAL at 18:42

## 2023-02-10 RX ADMIN — AMIODARONE HYDROCHLORIDE 200 MG: 200 TABLET ORAL at 18:43

## 2023-02-10 RX ADMIN — ASPIRIN 81 MG: 81 TABLET, COATED ORAL at 09:29

## 2023-02-10 RX ADMIN — CEFTRIAXONE 1 G: 1 INJECTION, POWDER, FOR SOLUTION INTRAMUSCULAR; INTRAVENOUS at 20:35

## 2023-02-10 RX ADMIN — MIDODRINE HYDROCHLORIDE 10 MG: 5 TABLET ORAL at 09:28

## 2023-02-10 RX ADMIN — SODIUM CHLORIDE, PRESERVATIVE FREE 10 ML: 5 INJECTION INTRAVENOUS at 22:22

## 2023-02-10 NOTE — PROGRESS NOTES
Attempted to see pt for assessment and home health orders but pt in the bathroom        CYNTHIA Palma RN  Care Management  Pager: [phone]

## 2023-02-10 NOTE — PROGRESS NOTES
CARDIAC SURGERY PROGRESS NOTE    2/10/2023  8:54 AM     CC:  Post Operative Day # 4     Chart, images and labs reviewed. Discussed with available staff. Interval History/Events of Past 24 hours:    Walked in [place] x 3 on 2 l/m. BB on hold-on midodrine, dopa 1 and jacob 30. Tolerates diet.  + BM    Subjective:  Patient seen and examined on rounds today. No complaints. Objective:  Vital signs:   Visit Vitals  BP 98/64   Pulse 80   Temp 97.9 °F (36.6 °C)   Resp 18   Ht 5' 6\" (1.676 m)   Wt 75.3 kg (166 lb)   SpO2 97%   Breastfeeding No   BMI 26.79 kg/m²     Temp (24hrs), Av.7 °F (37.1 °C), Min:97.9 °F (36.6 °C), Max:99.1 °F (37.3 °C)    Admission Weight: Last Weight   Weight: 72.6 kg (160 lb) Weight: 75.3 kg (166 lb)     Telemetry: SR    Physical Examination:     General:  Alert, oriented   Lungs: Clear to auscultation without rales, wheezes or rhonchi. Chest: Dressings clean and dry. Midline incision healing well. Sternum stable. Heart: regular rate and rhythm, No murmur. Abdomen: Soft and non-tender without masses. Bowel sounds present. Extremities: Warm and well perfused. Edema moderate. Incisions: clean, dry, no drainage. Neuro: No deficit. Labs:  Lab Results   Component Value Date/Time    WBC 11.0 02/10/2023 04:20 AM    HCT 29.4 (L) 02/10/2023 04:20 AM     02/10/2023 04:20 AM      Lab Results   Component Value Date/Time     02/10/2023 04:20 AM    K 3.1 (L) 02/10/2023 04:20 AM     (H) 02/10/2023 04:20 AM    CO2 23 02/10/2023 04:20 AM    GLU 79 02/10/2023 04:20 AM    BUN 13 02/10/2023 04:20 AM    CREA 0.50 (L) 02/10/2023 04:20 AM    CREA 0.42 (L) 2023 05:25 AM    CREA 0.60 2023 05:00 AM     CXR: volume loss left base, no ptx    Assessment:  S/P  CABG x 3  Respiratory parameters stable  Cardiac status stable     Plans:  1. Jacob for MAP >75-80, ? Stop dopa per Dr Sunny Cerda, hold BB until off midodrine. Amio ppx  2. Remove chest tubes  3.   Encourage IS and walking Blaire Wiley PA-C    PLEASE NOTE:  This document has been produced using voice recognition software. Unrecognized errors in transcription may be present. NOTE TO PATIENT:  The purpose of this note is to communicate optimally with the other providers involved in your care. It is written using standard medical terminology. If you have questions regarding details of the note please call my office at [phone] and make an appointment to discuss your concerns.

## 2023-02-10 NOTE — PROGRESS NOTES
Problem: Mobility Impaired (Adult and Pediatric)  Goal: *Acute Goals and Plan of Care (Insert Text)  Description: Physical Therapy Goals  Initiated 2/7/2023 and to be accomplished within 7 day(s)  1. Patient will move from supine to sit and sit to supine in bed with modified independence. 2.  Patient will transfer from bed to chair and chair to bed with modified independence using the least restrictive device. 3.  Patient will perform sit to stand with modified independence. 4.  Patient will ambulate with modified independence for 150 feet with the least restrictive device. 5.  Patient will ascend/descend 6 stairs with handrail(s) with modified independence. PLOF: Lives with spouse; sister to stay 24/7 at discharge. Split level home; 6 steps to main bedroom. Has ww, cane, wheelchair, and bedside commode. Outcome: Progressing Towards Goal     PHYSICAL THERAPY TREATMENT    Patient: Tito Pereira (59 y.o. female)  Date: 2/10/2023  Diagnosis: Chest pain, unspecified type [R07.9]  CAD (coronary artery disease), native coronary artery [I25.10] S/P CABG x 3  Procedure(s) (LRB):  CORONARY ARTERY BYPASS GRAFT (CABG) TIMES THREE (N/A) 4 Days Post-Op  Precautions: Skin, Sternal, Fall      ASSESSMENT:  Patient willing to work with PT. Reviewed sternal precautions and maintains throughout session. Chest tubes removed this morning. Good tolerance to mobility. CGA sit to stand transfer to rollator. She ambulates 350 ft with slow, steady gait. She denies SOB with mobility. Heart rate remained in the 90's. Patient negotiates 4 steps using B HR without difficulty. She ambulates to the toilet when returned to her room. CGA/SBA for standing dynamic balance for hygiene in standing and SBA for balance standing at the sink. Patient resting comfortably in recliner at end of session with call bell in reach.      Progression toward goals:   [x]      Improving appropriately and progressing toward goals  []      Improving slowly and progressing toward goals  []      Not making progress toward goals and plan of care will be adjusted     PLAN:  Patient continues to benefit from skilled intervention to address the above impairments. Continue treatment per established plan of care. Further Equipment Recommendations for Discharge:  rolling walker    AMPAC: Current research shows that an AM-PAC score of 18 (14 without stairs) or greater is associated with a discharge to the patient's home setting. Based on an AM-PAC score of 18/24 (or **/20 if omitting stairs) and their current functional mobility deficits, it is recommended that the patient have 2-3 sessions per week of Physical Therapy at d/c to increase the patient's independence. This AMPAC score should be considered in conjunction with interdisciplinary team recommendations to determine the most appropriate discharge setting. Patient's social support, diagnosis, medical stability, and prior level of function should also be taken into consideration. SUBJECTIVE:   Patient stated I feel better.     OBJECTIVE DATA SUMMARY:   Critical Behavior:  Neurologic State: Alert  Orientation Level: Oriented X4  Cognition: Follows commands     Functional Mobility Training:           Transfers:  Sit to Stand: Contact guard assistance  Stand to Sit: Contact guard assistance                Balance:  Standing: Impaired; With support  Standing - Static: Good  Standing - Dynamic : Fair         Ambulation/Gait Training:  Distance (ft): 350 Feet (ft)  Assistive Device: Walker, rollator  Ambulation - Level of Assistance: Stand-by assistance        Gait Abnormalities: Decreased step clearance              Speed/Sarah: Pace decreased (<100 feet/min); Slow                Stairs:  Number of Stairs Trained: 4  Stairs - Level of Assistance: Stand-by assistance  Rail Use: Both    Pain:  Pain level pre-treatment: 3/10  Pain level post-treatment: 3/10   Pain Intervention(s): Medication (see MAR);  Rest, Ice, Repositioning   Response to intervention: Nurse notified, See doc flow    Activity Tolerance:   Fair +  Please refer to the flowsheet for vital signs taken during this treatment. After treatment:   [x] Patient left in no apparent distress sitting up in chair  [] Patient left in no apparent distress in bed  [x] Call bell left within reach  [] Nursing notified  [] Caregiver present  [] Bed alarm activated  [] SCDs applied      COMMUNICATION/EDUCATION:   []         Role of Physical Therapy in the acute care setting. [x]         Fall prevention education was provided and the patient/caregiver indicated understanding. [x]         Patient/family have participated as able in working toward goals and plan of care. [x]         Patient/family agree to work toward stated goals and plan of care. []         Patient understands intent and goals of therapy, but is neutral about his/her participation. []         Patient is unable to participate in stated goals/plan of care: ongoing with therapy staff.  []         Other:        Ladi Bowie, PT   Time Calculation: 38 mins    Elidia [place]rera AM-PAC® Basic Mobility Inpatient Short Form (6-Clicks) Version 2    How much HELP from another person does the patient currently need    (If the patient hasn't done an activity recently, how much help from another person do you think he/she would need if he/she tried?)   Total (Total A or Dep)   A Lot  (Mod to Max A)   A Little (Sup or Min A)   None (Mod I to I)   Turning from your back to your side while in a flat bed without using bedrails? [] 1 [] 2 [x] 3 [] 4   2. Moving from lying on your back to sitting on the side of a flat bed without using bedrails? [] 1 [] 2 [x] 3 [] 4   3. Moving to and from a bed to a chair (including a wheelchair)? [] 1 [] 2 [x] 3 [] 4   4. Standing up from a chair using your arms (e.g., wheelchair, or bedside chair)? [] 1 [] 2 [x] 3 [] 4   5. Walking in hospital room? [] 1 [] 2 [x] 3 [] 4   6. Climbing 3-5 steps with a railing?+   [] 1 [] 2 [x] 3 [] 4   +If stair climbing cannot be assessed, skip item #6. Sum responses from items 1-5.

## 2023-02-10 NOTE — PROGRESS NOTES
[facility] - Progress Note  Admit Date: 2/3/2023    Assessment:       -s/p CABG 2/6/23 by Dr. Leonel Garcia, Tully Gosselin, SVG-OM, SVG-PDA  -LHC on 02/4/23 with RCA as well as left main disease.  -Echo 2/3/23 with EF 50-55%  -Obesity  -Diabetes  -Hypothyroidism  -Hypertension  -Hyperlipidemia, on statin. Primary cardiologist Dr. Pia Sanchez:       I saw, evaluated, interviewed and examined the patient personally. Denies any chest pain. Feels better. Decreased breath sound overall no significant rales  Pressors being weaned off  On aspirin and Plavix. Plan for removal of chest tube today  Discussed with Dr. Leonel Garcia. Plan to discharge over the weekend. Once discharged, she will need to come see me in the clinic in 4 weeks    Emanuel Gonzalez MD       CABG x3 POD #4, continue routine post op surgical care per CTS team.   Wean pressors as tolerated. aspirin, Plavix  Continued on amiodarone. Subjective:     States she feels really \"good\" today.      Objective:      Patient Vitals for the past 8 hrs:   Temp Pulse Resp BP SpO2   02/10/23 1000 -- 83 22 105/64 96 %   02/10/23 0930 -- 85 22 94/61 100 %   02/10/23 0928 -- -- -- 93/68 --   02/10/23 0900 -- 88 25 93/68 97 %   02/10/23 0830 -- 80 13 96/67 100 %   02/10/23 0800 97.9 °F (36.6 °C) 80 18 98/64 97 %   02/10/23 0730 -- 72 16 116/60 97 %   02/10/23 0700 -- 73 15 112/66 98 %   02/10/23 0630 -- 83 16 116/74 99 %   02/10/23 0600 -- 71 21 116/65 98 %   02/10/23 0500 -- 74 19 133/76 97 %   02/10/23 0430 -- 73 22 (!) 147/71 98 %   02/10/23 0400 98.6 °F (37 °C) 79 26 (!) 167/83 98 %   02/10/23 0330 -- 73 16 (!) 144/70 98 %   02/10/23 0300 -- 73 21 134/66 96 %           Patient Vitals for the past 96 hrs:   Weight   02/10/23 0548 75.3 kg (166 lb)   02/09/23 0600 79.3 kg (174 lb 12.8 oz)   02/08/23 0430 80.3 kg (177 lb)   02/07/23 0600 79.8 kg (176 lb)                      Intake/Output Summary (Last 24 hours) at 2/10/2023 1045  Last data filed at 2/10/2023 1000  Gross per 24 hour   Intake 703.19 ml   Output 4501 ml   Net -3797.81 ml       General: No apparent distress, alert   Neck:  nontender  Lungs:  decreased  Heart:  regular rate and rhythm, S1, S2 normal, no murmur, click, rub or gallop  Abdomen:  abdomen is soft without significant tenderness,   Extremities: No obvious edema. Under wrap bandages    Data Review:     Labs: Results:       Chemistry Recent Labs     02/10/23  0420 02/09/23  0525 02/08/23  0500   GLU 79 115* 151*    140 140   K 3.1* 3.7 3.8   * 107 109   CO2 23 30 25   BUN 13 12 16   CREA 0.50* 0.42* 0.60   CA 8.1* 8.4* 8.1*   MG 1.9 1.7 1.8   AGAP 8 3 6   BUCR 26* 29* 27*        CBC w/Diff Recent Labs     02/10/23  0420 02/09/23  0525 02/08/23  0500   WBC 11.0 10.8 14.2*   RBC 3.04* 2.77* 2.78*   HGB 9.5* 8.7* 8.8*   HCT 29.4* 26.0* 25.9*    201 184   GRANS 69 76* 81*   LYMPH 18* 13* 9*   EOS 2 1 0        Cardiac Enzymes No results found for: CPK, CK, CKMMB, CKMB, RCK3, CKMBT, CKNDX, CKND1, LOYD, TROPT, TROIQ, BOY, TROPT, TNIPOC, BNP, BNPP   Coagulation No results for input(s): PTP, INR, APTT, INREXT, INREXT in the last 72 hours. Lipid Panel Lab Results   Component Value Date/Time    Cholesterol, total 185 02/04/2023 05:45 AM    HDL Cholesterol 40 02/04/2023 05:45 AM    LDL, calculated 108.6 (H) 02/04/2023 05:45 AM    VLDL, calculated 36.4 02/04/2023 05:45 AM    Triglyceride 182 (H) 02/04/2023 05:45 AM    CHOL/HDL Ratio 4.6 02/04/2023 05:45 AM      BNP No results found for: BNP, BNPP, XBNPT   Liver Enzymes No results for input(s): TP, ALB, TBIL, AP in the last 72 hours.     No lab exists for component: SGOT, GPT, DBIL     Digoxin    Thyroid Studies Lab Results   Component Value Date/Time    TSH 1.40 02/04/2023 05:45 AM          Signed By: Richelle Altamirano PA-C     February 10, 2023

## 2023-02-10 NOTE — PROGRESS NOTES
Problem: Self Care Deficits Care Plan (Adult)  Goal: *Acute Goals and Plan of Care (Insert Text)  Description: Occupational Therapy Goals  Initiated 2/7/2023 within 7 day(s). 1.  Patient will perform upper body dressing with modified independence. 2.  Patient will perform lower body dressing with supervision/set-up with AE as needed. 3.  Patient will perform functional bed mobility in prep for self care with supervision/set-up. 4.  Patient will perform toilet transfers with supervision/set-up. 5.  Patient will perform all aspects of toileting with supervision/set-up. 6.  Patient will participate in upper extremity therapeutic exercise/activities with supervision/set-up for 8 minutes. 7.  Patient will utilize energy conservation techniques/sternal precautions during functional activities with verbal cues. Prior Level of Function:Pt reports independence     Outcome: Progressing Towards Goal  OCCUPATIONAL THERAPY TREATMENT    Patient: Sidney Shah (8 y.o. female)  Date: 2/10/2023  Diagnosis: Chest pain, unspecified type [R07.9]  CAD (coronary artery disease), native coronary artery [I25.10] S/P CABG x 3  Procedure(s) (LRB):  CORONARY ARTERY BYPASS GRAFT (CABG) TIMES THREE (N/A) 4 Days Post-Op  Precautions: Skin, Sternal, Fall    Chart, occupational therapy assessment, plan of care, and goals were reviewed. ASSESSMENT:  Pt is motivated to participate in OT this pm, reports recently getting up to go to the BR and standing grooming task. Pt seen for education/practice of adaptive strategies for UB/LB ADLs while following sternal precautions. Following education and demonstration of UB/LB ADL techniques pt was able to simulate UB dressing with Min A and good following of her precautions. Pt required Min A to don socks using crossed LEs technique, reports mostly wearing slip on shoes at home. Pt reports difficulty with toileting hygiene due to mult lines.  Replaced pulse ox from right to left hand, following which pt was able to perform standing bowel and bladder hygiene with SBA for balance. Pt is requesting a printed handout of her sternal precautions prior to DC to maximize safety and carryover of education in home environment. Progression toward goals:  [x]          Improving appropriately and progressing toward goals  []          Improving slowly and progressing toward goals  []          Not making progress toward goals and plan of care will be adjusted     PLAN:  Patient continues to benefit from skilled intervention to address the above impairments. Continue treatment per established plan of care. Further Equipment Recommendations for Discharge:  shower chair    AMPAC: Current research shows that an AM-PAC score of 18 or greater is associated with a discharge to the patient's home setting. Based on an AM-PAC score of 20/24 and their current ADL deficits; it is recommended that the patient have 2-3 sessions per week of Occupational Therapy at d/c to increase the patient's independence. This AMPAC score should be considered in conjunction with interdisciplinary team recommendations to determine the most appropriate discharge setting. Patient's social support, diagnosis, medical stability, and prior level of function should also be taken into consideration. SUBJECTIVE:   Patient stated I feel so much better today.     OBJECTIVE DATA SUMMARY:   Cognitive/Behavioral Status:  Neurologic State: Alert  Orientation Level: Oriented X4  Cognition: Follows commands  Safety/Judgement: Awareness of environment    Functional Mobility and Transfers for ADLs:    Transfers:  Sit to Stand: Contact guard assistance  Stand to Sit: Contact guard assistance   Balance:  Sitting: Intact  Standing: Impaired; With support  Standing - Static: Good  Standing - Dynamic : Fair    ADL Intervention:     Upper Body Dressing Assistance  Pullover Shirt: Minimum assistance (simulated)    Lower Body Dressing Assistance  Socks: Minimum assistance    Toileting  Bladder Hygiene: Supervision  Bowel Hygiene: Supervision    Cognitive Retraining  Safety/Judgement: Awareness of environment  Pain:  Pain level pre-treatment: not rated  Pain level post-treatment: not rated  Activity Tolerance:    Fair  Please refer to the flowsheet for vital signs taken during this treatment. After treatment:   []  Patient left in no apparent distress sitting up in chair  [x]  Patient left in no apparent distress in bed  [x]  Call bell left within reach  [x]  Nursing notified  []  Caregiver present  []  Bed alarm activated    COMMUNICATION/EDUCATION:   [x] Role of Occupational Therapy in the acute care setting  [x] Home safety education was provided and the patient/caregiver indicated understanding. [x] Patient/family have participated as able in working towards goals and plan of care. [] Patient/family agree to work toward stated goals and plan of care. [] Patient understands intent and goals of therapy, but is neutral about his/her participation. [] Patient is unable to participate in goal setting and plan of care. Thank you for this referral.  Dick Valdes, OTR/L  Time Calculation: 27 mins    Dharmesh Rivera -PAC® Daily Activity Inpatient Short Form (6-Clicks)*    How much HELP from another person does the patient currently need    (If the patient hasn't done an activity recently, how much help from another person do you think he/she would need if he/she tried?)   Total (Total A or Dep)   A Lot  (Mod to Max A)   A Little (Sup or Min A)   None (Mod I to I)   Putting on and taking off regular lower body clothing? [] 1 [] 2 [x] 3 [] 4   2. Bathing (including washing, rinsing,      drying)? [] 1 [] 2 [x] 3 [] 4   3. Toileting, which includes using toilet, bedpan or urinal?   [] 1 [] 2 [x] 3 [] 4   4. Putting on and taking off regular upper body clothing? [] 1 [] 2 [x] 3 [] 4   5. Taking care of personal grooming such as brushing teeth?    [] 1 [] 2 [] 3 [x] 4   6. Eating meals? [] 1 [] 2 [] 3 [x] 4      Current research shows that an AM-PAC score of 18 or greater is associated with a discharge to the patient's home setting. Based on an AM-PAC score of 20/24 and their current ADL deficits; it is recommended that the patient have 2-3 sessions per week of Occupational Therapy at d/c to increase the patient's independence.

## 2023-02-10 NOTE — PROGRESS NOTES
0700-received bedside report from off going nurse Juan Daniel Contreras, [place]. Pt in recliner resting quietly with no complaints at this time. Drips verified. 1030-CHELSY Stuart at bedside. Chest tubes removed by provider. Pt tolerated well. .    1100-pt ambulated in the hallway with physical therapy. Pt tolerated well. 1350-OT at bedside. 1448-informed CHELSY Stuart pt is having urinary frequency and her urine is cloudy with a foul odor. Telephone order given urinalysis. 1902-informed CHELSY Stuart of urinalysis results. Telephone order given for ceftriaxone 1g now and every 24 hours for 5 doses. Read back completed.

## 2023-02-10 NOTE — PROGRESS NOTES
0700-received bedside report from off going nurse ERNESTO Ledezma. Pt in recliner resting quietly with no complaints at this time. 0900-removed boogie catheter per order. Pt tolerated well. Pt has until 1500 to void. 0920-removed left radial arterial line per order. Minimal blood loss observed. Pt tolerated well. 0940-pt ambulated on the hallway with PT/OT. 1430-pt ambulated in the hallway ~750 feet. Pt tolerated well. 1500-administered soap suds enema per order. 1800-pt ambulated in the hallway ~400 feet. Pt tolerated well.

## 2023-02-11 ENCOUNTER — APPOINTMENT (OUTPATIENT)
Facility: HOSPITAL | Age: 68
DRG: 234 | End: 2023-02-11
Attending: THORACIC SURGERY (CARDIOTHORACIC VASCULAR SURGERY)
Payer: MEDICARE

## 2023-02-11 LAB
ANION GAP SERPL CALC-SCNC: 4 MMOL/L (ref 3–18)
BASOPHILS # BLD: 0 K/UL (ref 0–0.1)
BASOPHILS NFR BLD: 0 % (ref 0–2)
BUN SERPL-MCNC: 15 MG/DL (ref 7–18)
BUN/CREAT SERPL: 25 (ref 12–20)
CALCIUM SERPL-MCNC: 7.9 MG/DL (ref 8.5–10.1)
CHLORIDE SERPL-SCNC: 115 MMOL/L (ref 100–111)
CO2 SERPL-SCNC: 24 MMOL/L (ref 21–32)
CREAT SERPL-MCNC: 0.61 MG/DL (ref 0.6–1.3)
DIFFERENTIAL METHOD BLD: ABNORMAL
EOSINOPHIL # BLD: 0.4 K/UL (ref 0–0.4)
EOSINOPHIL NFR BLD: 4 % (ref 0–5)
ERYTHROCYTE [DISTWIDTH] IN BLOOD BY AUTOMATED COUNT: 13.5 % (ref 11.6–14.5)
GLUCOSE BLD STRIP.AUTO-MCNC: 107 MG/DL (ref 70–110)
GLUCOSE BLD STRIP.AUTO-MCNC: 117 MG/DL (ref 70–110)
GLUCOSE BLD STRIP.AUTO-MCNC: 169 MG/DL (ref 70–110)
GLUCOSE BLD STRIP.AUTO-MCNC: 92 MG/DL (ref 70–110)
GLUCOSE SERPL-MCNC: 108 MG/DL (ref 74–99)
HCT VFR BLD AUTO: 26.9 % (ref 35–45)
HGB BLD-MCNC: 8.9 G/DL (ref 12–16)
IMM GRANULOCYTES # BLD AUTO: 0.3 K/UL (ref 0–0.04)
IMM GRANULOCYTES NFR BLD AUTO: 3 % (ref 0–0.5)
LYMPHOCYTES # BLD: 1.9 K/UL (ref 0.9–3.6)
LYMPHOCYTES NFR BLD: 20 % (ref 21–52)
MAGNESIUM SERPL-MCNC: 1.9 MG/DL (ref 1.6–2.6)
MCH RBC QN AUTO: 31.8 PG (ref 24–34)
MCHC RBC AUTO-ENTMCNC: 33.1 G/DL (ref 31–37)
MCV RBC AUTO: 96.1 FL (ref 78–100)
MONOCYTES # BLD: 0.9 K/UL (ref 0.05–1.2)
MONOCYTES NFR BLD: 9 % (ref 3–10)
NEUTS SEG # BLD: 6.1 K/UL (ref 1.8–8)
NEUTS SEG NFR BLD: 63 % (ref 40–73)
NRBC # BLD: 0.05 K/UL (ref 0–0.01)
NRBC BLD-RTO: 0.5 PER 100 WBC
PLATELET # BLD AUTO: 263 K/UL (ref 135–420)
PMV BLD AUTO: 10.6 FL (ref 9.2–11.8)
POTASSIUM SERPL-SCNC: 3.8 MMOL/L (ref 3.5–5.5)
RBC # BLD AUTO: 2.8 M/UL (ref 4.2–5.3)
SODIUM SERPL-SCNC: 143 MMOL/L (ref 136–145)
WBC # BLD AUTO: 9.6 K/UL (ref 4.6–13.2)

## 2023-02-11 PROCEDURE — 71045 X-RAY EXAM CHEST 1 VIEW: CPT

## 2023-02-11 PROCEDURE — 80048 BASIC METABOLIC PNL TOTAL CA: CPT

## 2023-02-11 PROCEDURE — 83735 ASSAY OF MAGNESIUM: CPT

## 2023-02-11 PROCEDURE — 6360000002 HC RX W HCPCS: Performed by: PHYSICIAN ASSISTANT

## 2023-02-11 PROCEDURE — 2580000003 HC RX 258: Performed by: PHYSICIAN ASSISTANT

## 2023-02-11 PROCEDURE — 82962 GLUCOSE BLOOD TEST: CPT

## 2023-02-11 PROCEDURE — 85025 COMPLETE CBC W/AUTO DIFF WBC: CPT

## 2023-02-11 PROCEDURE — 6370000000 HC RX 637 (ALT 250 FOR IP): Performed by: PHYSICIAN ASSISTANT

## 2023-02-11 PROCEDURE — 2000000000 HC ICU R&B

## 2023-02-11 RX ADMIN — INSULIN GLARGINE 25 UNITS: 100 INJECTION, SOLUTION SUBCUTANEOUS at 09:52

## 2023-02-11 RX ADMIN — SODIUM CHLORIDE, PRESERVATIVE FREE 10 ML: 5 INJECTION INTRAVENOUS at 17:35

## 2023-02-11 RX ADMIN — ACETAMINOPHEN 1000 MG: 500 TABLET ORAL at 00:00

## 2023-02-11 RX ADMIN — SODIUM CHLORIDE, PRESERVATIVE FREE 10 ML: 5 INJECTION INTRAVENOUS at 10:00

## 2023-02-11 RX ADMIN — LEVOTHYROXINE SODIUM 100 MCG: 100 TABLET ORAL at 07:43

## 2023-02-11 RX ADMIN — ACETAMINOPHEN 1000 MG: 500 TABLET ORAL at 11:48

## 2023-02-11 RX ADMIN — ASPIRIN 81 MG: 81 TABLET, COATED ORAL at 09:53

## 2023-02-11 RX ADMIN — CEFTRIAXONE 1000 MG: 1 INJECTION, POWDER, FOR SOLUTION INTRAMUSCULAR; INTRAVENOUS at 20:22

## 2023-02-11 RX ADMIN — HEPARIN SODIUM 5000 UNITS: 5000 INJECTION INTRAVENOUS; SUBCUTANEOUS at 06:10

## 2023-02-11 RX ADMIN — INSULIN LISPRO 2 UNITS: 100 INJECTION, SOLUTION INTRAVENOUS; SUBCUTANEOUS at 11:51

## 2023-02-11 RX ADMIN — FAMOTIDINE 20 MG: 20 TABLET, FILM COATED ORAL at 21:05

## 2023-02-11 RX ADMIN — CHLORHEXIDINE GLUCONATE 0.12% ORAL RINSE 15 ML: 1.2 LIQUID ORAL at 21:05

## 2023-02-11 RX ADMIN — IRON SUCROSE 200 MG: 20 INJECTION, SOLUTION INTRAVENOUS at 09:54

## 2023-02-11 RX ADMIN — MIDODRINE HYDROCHLORIDE 10 MG: 5 TABLET ORAL at 11:51

## 2023-02-11 RX ADMIN — CHLORHEXIDINE GLUCONATE 0.12% ORAL RINSE 15 ML: 1.2 LIQUID ORAL at 09:53

## 2023-02-11 RX ADMIN — MIDODRINE HYDROCHLORIDE 10 MG: 5 TABLET ORAL at 17:34

## 2023-02-11 RX ADMIN — ACETAMINOPHEN 1000 MG: 500 TABLET ORAL at 06:10

## 2023-02-11 RX ADMIN — THERA TABS 1 TABLET: TAB at 09:53

## 2023-02-11 RX ADMIN — MIDODRINE HYDROCHLORIDE 10 MG: 5 TABLET ORAL at 09:53

## 2023-02-11 RX ADMIN — HEPARIN SODIUM 5000 UNITS: 5000 INJECTION INTRAVENOUS; SUBCUTANEOUS at 22:19

## 2023-02-11 RX ADMIN — ATORVASTATIN CALCIUM 20 MG: 20 TABLET, FILM COATED ORAL at 21:05

## 2023-02-11 RX ADMIN — ACETAMINOPHEN 1000 MG: 500 TABLET ORAL at 17:34

## 2023-02-11 RX ADMIN — AMIODARONE HYDROCHLORIDE 200 MG: 200 TABLET ORAL at 21:05

## 2023-02-11 RX ADMIN — CLOPIDOGREL BISULFATE 75 MG: 75 TABLET ORAL at 09:54

## 2023-02-11 RX ADMIN — HEPARIN SODIUM 5000 UNITS: 5000 INJECTION INTRAVENOUS; SUBCUTANEOUS at 13:47

## 2023-02-11 RX ADMIN — AMIODARONE HYDROCHLORIDE 200 MG: 200 TABLET ORAL at 09:53

## 2023-02-11 RX ADMIN — Medication 10 MG: at 22:30

## 2023-02-11 RX ADMIN — SODIUM CHLORIDE, PRESERVATIVE FREE 10 ML: 5 INJECTION INTRAVENOUS at 00:00

## 2023-02-11 RX ADMIN — FAMOTIDINE 20 MG: 20 TABLET, FILM COATED ORAL at 09:53

## 2023-02-11 ASSESSMENT — PAIN SCALES - GENERAL
PAINLEVEL_OUTOF10: 0
PAINLEVEL_OUTOF10: 0
PAINLEVEL_OUTOF10: 4
PAINLEVEL_OUTOF10: 0

## 2023-02-11 ASSESSMENT — PAIN DESCRIPTION - ORIENTATION
ORIENTATION: ANTERIOR;MID
ORIENTATION: ANTERIOR;MID

## 2023-02-11 ASSESSMENT — PAIN DESCRIPTION - DESCRIPTORS: DESCRIPTORS: ACHING

## 2023-02-11 ASSESSMENT — PAIN DESCRIPTION - LOCATION
LOCATION: CHEST;GENERALIZED;INCISION
LOCATION: GENERALIZED;CHEST;INCISION

## 2023-02-11 ASSESSMENT — PAIN - FUNCTIONAL ASSESSMENT: PAIN_FUNCTIONAL_ASSESSMENT: ACTIVITIES ARE NOT PREVENTED

## 2023-02-11 NOTE — PROGRESS NOTES
0700-received shift report from off going nurse Lisa Ulrich RN. Pt in bed resting quietly with no complaints at this time. 0800-pt assisted to the bathroom. Pt placed in the recliner.

## 2023-02-11 NOTE — PROGRESS NOTES
CARDIAC SURGERY PROGRESS NOTE    2023  8:49 AM     CC:  Post Operative Day # 5     Chart, images and labs reviewed. Discussed with available staff. Rounded with Dr. Maria E Walters. Interval History/Events of Past 24 hours:   Walks in [place] without supplemental oxygen. Tolerates PO and moving bowels. Dopa off. Decreasing tip requirements. On midodrine    Subjective:  Patient seen and examined on rounds today. No complaints  Pain level: controlled  Ambulating: well   Sleeping: well  Eating:  well  Sternal pain: moderate    BM: Yes    Objective:  Vital signs:   /74   Pulse 88   Temp 98 °F (36.7 °C) (Oral)   Resp 24   Ht 5' 6\" (1.676 m)   Wt 166 lb (75.3 kg)   BMI 26.79 kg/m²   Temp (24hrs), Av.2 °F (36.8 °C), Min:98 °F (36.7 °C), Max:98.3 °F (36.8 °C)    Admission Weight: Last Weight   Weight: 166 lb (75.3 kg) Weight: 166 lb (75.3 kg)     Telemetry: SR    Physical Examination:     General:  Alert, oriented   Lungs: Clear to auscultation without rales, wheezes or rhonchi. Chest: Dressings clean and dry. Midline incision healing well. Sternum stable. Heart: regular rate and rhythm, No murmur. Abdomen: Soft and non-tender without masses. Bowel sounds present. Extremities: Warm and well perfused. Edema moderate. Incisions: clean, dry, no drainage. Neuro: No deficit. Labs:  Lab Results   Component Value Date/Time    WBC 9.6 2023 05:51 AM    HCT 26.9 (L) 2023 05:51 AM     2023 05:51 AM      Lab Results   Component Value Date/Time     2023 05:51 AM    K 3.8 2023 05:51 AM     (H) 2023 05:51 AM    CO2 24 2023 05:51 AM    BUN 15 2023 05:51 AM       CXR: no ptx or signif eff      Assessment:  S/P  CABG x 3  Respiratory parameters  Stable  Cardiac status Stable     Plans:  1. Decrease tip and midodrine. Hold BB for now  2. IS and ambulate  3. Rocephin for +UA   4.   Likely home early next week    Heart Hugger use is encouraged to mitigate pain and will also assist with deep breathing and incentive spirometry goals. Possible discharge 2 days. Angel Patel PA-C    PLEASE NOTE:  This document has been produced using voice recognition software. Unrecognized errors in transcription may be present. NOTE TO PATIENT:  The purpose of this note is to communicate optimally with the other providers involved in your care. It is written using standard medical terminology. If you have questions regarding details of the note please call my office at [phone] and make an appointment to discuss your concerns.

## 2023-02-11 NOTE — PROGRESS NOTES
1300: Bedside and Verbal shift change report given to This Writer (oncoming nurse) by Jack Rodriguez RN (offgoing nurse). Report included the following information Nurse Handoff Report, Surgery Report, Intake/Output, MAR, Recent Results, and Cardiac Rhythm SR . Wound Prevention Checklist    Patient: Nadeem Eastman (49 y.o. female)  Date: 2/11/2023  Diagnosis: Chest pain, unspecified type [R07.9]  CAD (coronary artery disease), native coronary artery [I25.10] <principal problem not specified>    Precautions:         []  Heel prevention boots placed on patient    [x]  Patient turned q2h during shift    []  Lift team ordered    [x]  Patient on Jordana bed/Specialty bed    [x]  Each Wound is documented during shift (Stage, Color, drainage, odor, measurements, and dressings)    [x]  Dual skin check done with YULIANA Banegas RN      1500: Patient ambulated in the hallway x2 laps, tolerated well.     1502: Randall stopped, patient's /142 while ambulating in hallway. 1820: Patient ambulated in hallway x2 laps, then returned to bed, tolerated well.     1900: Bedside and Verbal shift change report given to Marianna Garner, [facility] (oncoming nurse) by Cuba Rincon RN (offgoing nurse).  Report included the following information Nurse Handoff Report, Intake/Output, MAR, Recent Results, and Cardiac Rhythm SR .

## 2023-02-12 ENCOUNTER — APPOINTMENT (OUTPATIENT)
Facility: HOSPITAL | Age: 68
DRG: 234 | End: 2023-02-12
Attending: THORACIC SURGERY (CARDIOTHORACIC VASCULAR SURGERY)
Payer: MEDICARE

## 2023-02-12 LAB
ANION GAP SERPL CALC-SCNC: 7 MMOL/L (ref 3–18)
BASOPHILS # BLD: 0 K/UL (ref 0–0.1)
BASOPHILS NFR BLD: 0 % (ref 0–2)
BUN SERPL-MCNC: 15 MG/DL (ref 7–18)
BUN/CREAT SERPL: 23 (ref 12–20)
CA-I SERPL-SCNC: 1.2 MMOL/L (ref 1.15–1.33)
CALCIUM SERPL-MCNC: 7.9 MG/DL (ref 8.5–10.1)
CHLORIDE SERPL-SCNC: 114 MMOL/L (ref 100–111)
CO2 SERPL-SCNC: 22 MMOL/L (ref 21–32)
CREAT SERPL-MCNC: 0.64 MG/DL (ref 0.6–1.3)
DIFFERENTIAL METHOD BLD: ABNORMAL
EOSINOPHIL # BLD: 0.2 K/UL (ref 0–0.4)
EOSINOPHIL NFR BLD: 2 % (ref 0–5)
ERYTHROCYTE [DISTWIDTH] IN BLOOD BY AUTOMATED COUNT: 14.3 % (ref 11.6–14.5)
GLUCOSE BLD STRIP.AUTO-MCNC: 109 MG/DL (ref 70–110)
GLUCOSE BLD STRIP.AUTO-MCNC: 113 MG/DL (ref 70–110)
GLUCOSE BLD STRIP.AUTO-MCNC: 167 MG/DL (ref 70–110)
GLUCOSE BLD STRIP.AUTO-MCNC: 99 MG/DL (ref 70–110)
GLUCOSE SERPL-MCNC: 108 MG/DL (ref 74–99)
HCT VFR BLD AUTO: 25.7 % (ref 35–45)
HGB BLD-MCNC: 8.6 G/DL (ref 12–16)
IMM GRANULOCYTES # BLD AUTO: 0 K/UL (ref 0–0.04)
IMM GRANULOCYTES NFR BLD AUTO: 0 % (ref 0–0.5)
LYMPHOCYTES # BLD: 1.9 K/UL (ref 0.9–3.6)
LYMPHOCYTES NFR BLD: 22 % (ref 21–52)
MAGNESIUM SERPL-MCNC: 1.7 MG/DL (ref 1.6–2.6)
MCH RBC QN AUTO: 31.5 PG (ref 24–34)
MCHC RBC AUTO-ENTMCNC: 33.5 G/DL (ref 31–37)
MCV RBC AUTO: 94.1 FL (ref 78–100)
MONOCYTES # BLD: 0.5 K/UL (ref 0.05–1.2)
MONOCYTES NFR BLD: 6 % (ref 3–10)
NEUTS SEG # BLD: 6.1 K/UL (ref 1.8–8)
NEUTS SEG NFR BLD: 70 % (ref 40–73)
NRBC # BLD: 0.05 K/UL (ref 0–0.01)
NRBC BLD-RTO: 0.6 PER 100 WBC
PLATELET # BLD AUTO: 215 K/UL (ref 135–420)
PLATELET COMMENT: ABNORMAL
PMV BLD AUTO: 10.2 FL (ref 9.2–11.8)
POTASSIUM SERPL-SCNC: 3.2 MMOL/L (ref 3.5–5.5)
RBC # BLD AUTO: 2.73 M/UL (ref 4.2–5.3)
RBC MORPH BLD: ABNORMAL
RBC MORPH BLD: ABNORMAL
SODIUM SERPL-SCNC: 143 MMOL/L (ref 136–145)
WBC # BLD AUTO: 8.7 K/UL (ref 4.6–13.2)
WBC MORPH BLD: ABNORMAL

## 2023-02-12 PROCEDURE — 6360000002 HC RX W HCPCS: Performed by: THORACIC SURGERY (CARDIOTHORACIC VASCULAR SURGERY)

## 2023-02-12 PROCEDURE — 83735 ASSAY OF MAGNESIUM: CPT

## 2023-02-12 PROCEDURE — 2000000000 HC ICU R&B

## 2023-02-12 PROCEDURE — 94761 N-INVAS EAR/PLS OXIMETRY MLT: CPT

## 2023-02-12 PROCEDURE — 85025 COMPLETE CBC W/AUTO DIFF WBC: CPT

## 2023-02-12 PROCEDURE — 2580000003 HC RX 258: Performed by: PHYSICIAN ASSISTANT

## 2023-02-12 PROCEDURE — 6370000000 HC RX 637 (ALT 250 FOR IP): Performed by: THORACIC SURGERY (CARDIOTHORACIC VASCULAR SURGERY)

## 2023-02-12 PROCEDURE — 97535 SELF CARE MNGMENT TRAINING: CPT

## 2023-02-12 PROCEDURE — 6360000002 HC RX W HCPCS: Performed by: PHYSICIAN ASSISTANT

## 2023-02-12 PROCEDURE — 6370000000 HC RX 637 (ALT 250 FOR IP): Performed by: PHYSICIAN ASSISTANT

## 2023-02-12 PROCEDURE — 82962 GLUCOSE BLOOD TEST: CPT

## 2023-02-12 PROCEDURE — 82330 ASSAY OF CALCIUM: CPT

## 2023-02-12 PROCEDURE — 80048 BASIC METABOLIC PNL TOTAL CA: CPT

## 2023-02-12 PROCEDURE — 71045 X-RAY EXAM CHEST 1 VIEW: CPT

## 2023-02-12 RX ORDER — POTASSIUM CHLORIDE 20 MEQ/1
40 TABLET, EXTENDED RELEASE ORAL EVERY 8 HOURS
Status: COMPLETED | OUTPATIENT
Start: 2023-02-12 | End: 2023-02-12

## 2023-02-12 RX ORDER — CALCIUM GLUCONATE 94 MG/ML
2000 INJECTION, SOLUTION INTRAVENOUS ONCE
Status: COMPLETED | OUTPATIENT
Start: 2023-02-12 | End: 2023-02-12

## 2023-02-12 RX ADMIN — HEPARIN SODIUM 5000 UNITS: 5000 INJECTION INTRAVENOUS; SUBCUTANEOUS at 21:32

## 2023-02-12 RX ADMIN — MIDODRINE HYDROCHLORIDE 10 MG: 5 TABLET ORAL at 09:14

## 2023-02-12 RX ADMIN — BISACODYL 10 MG: 5 TABLET, COATED ORAL at 09:17

## 2023-02-12 RX ADMIN — CHLORHEXIDINE GLUCONATE 0.12% ORAL RINSE 15 ML: 1.2 LIQUID ORAL at 09:17

## 2023-02-12 RX ADMIN — SODIUM CHLORIDE, PRESERVATIVE FREE 10 ML: 5 INJECTION INTRAVENOUS at 15:06

## 2023-02-12 RX ADMIN — CLOPIDOGREL BISULFATE 75 MG: 75 TABLET ORAL at 09:17

## 2023-02-12 RX ADMIN — CEFTRIAXONE 1000 MG: 1 INJECTION, POWDER, FOR SOLUTION INTRAMUSCULAR; INTRAVENOUS at 20:15

## 2023-02-12 RX ADMIN — POLYETHYLENE GLYCOL 3350 17 G: 17 POWDER, FOR SOLUTION ORAL at 09:17

## 2023-02-12 RX ADMIN — SODIUM CHLORIDE, PRESERVATIVE FREE 10 ML: 5 INJECTION INTRAVENOUS at 04:26

## 2023-02-12 RX ADMIN — ACETAMINOPHEN 1000 MG: 500 TABLET ORAL at 17:13

## 2023-02-12 RX ADMIN — SODIUM CHLORIDE, PRESERVATIVE FREE 10 ML: 5 INJECTION INTRAVENOUS at 09:19

## 2023-02-12 RX ADMIN — ACETAMINOPHEN 1000 MG: 500 TABLET ORAL at 04:20

## 2023-02-12 RX ADMIN — CALCIUM GLUCONATE 2000 MG: 98 INJECTION, SOLUTION INTRAVENOUS at 09:11

## 2023-02-12 RX ADMIN — ACETAMINOPHEN 1000 MG: 500 TABLET ORAL at 11:43

## 2023-02-12 RX ADMIN — POTASSIUM CHLORIDE 40 MEQ: 1500 TABLET, EXTENDED RELEASE ORAL at 09:17

## 2023-02-12 RX ADMIN — MIDODRINE HYDROCHLORIDE 10 MG: 5 TABLET ORAL at 11:43

## 2023-02-12 RX ADMIN — METOPROLOL TARTRATE 12.5 MG: 25 TABLET, FILM COATED ORAL at 09:17

## 2023-02-12 RX ADMIN — INSULIN LISPRO 2 UNITS: 100 INJECTION, SOLUTION INTRAVENOUS; SUBCUTANEOUS at 11:46

## 2023-02-12 RX ADMIN — LEVOTHYROXINE SODIUM 100 MCG: 100 TABLET ORAL at 09:14

## 2023-02-12 RX ADMIN — FAMOTIDINE 20 MG: 20 TABLET, FILM COATED ORAL at 21:32

## 2023-02-12 RX ADMIN — CHLORHEXIDINE GLUCONATE 0.12% ORAL RINSE 15 ML: 1.2 LIQUID ORAL at 21:31

## 2023-02-12 RX ADMIN — HEPARIN SODIUM 5000 UNITS: 5000 INJECTION INTRAVENOUS; SUBCUTANEOUS at 15:05

## 2023-02-12 RX ADMIN — AMIODARONE HYDROCHLORIDE 200 MG: 200 TABLET ORAL at 21:31

## 2023-02-12 RX ADMIN — AMIODARONE HYDROCHLORIDE 200 MG: 200 TABLET ORAL at 09:17

## 2023-02-12 RX ADMIN — ASPIRIN 81 MG: 81 TABLET, COATED ORAL at 09:17

## 2023-02-12 RX ADMIN — THERA TABS 1 TABLET: TAB at 09:17

## 2023-02-12 RX ADMIN — METOPROLOL TARTRATE 12.5 MG: 25 TABLET, FILM COATED ORAL at 21:31

## 2023-02-12 RX ADMIN — MIDODRINE HYDROCHLORIDE 10 MG: 5 TABLET ORAL at 17:13

## 2023-02-12 RX ADMIN — FAMOTIDINE 20 MG: 20 TABLET, FILM COATED ORAL at 09:17

## 2023-02-12 RX ADMIN — POTASSIUM CHLORIDE 40 MEQ: 1500 TABLET, EXTENDED RELEASE ORAL at 17:13

## 2023-02-12 RX ADMIN — ATORVASTATIN CALCIUM 20 MG: 20 TABLET, FILM COATED ORAL at 21:32

## 2023-02-12 RX ADMIN — HEPARIN SODIUM 5000 UNITS: 5000 INJECTION INTRAVENOUS; SUBCUTANEOUS at 06:05

## 2023-02-12 RX ADMIN — INSULIN GLARGINE 25 UNITS: 100 INJECTION, SOLUTION SUBCUTANEOUS at 09:12

## 2023-02-12 ASSESSMENT — PAIN - FUNCTIONAL ASSESSMENT: PAIN_FUNCTIONAL_ASSESSMENT: ACTIVITIES ARE NOT PREVENTED

## 2023-02-12 ASSESSMENT — PAIN DESCRIPTION - LOCATION
LOCATION: CHEST
LOCATION: CHEST

## 2023-02-12 ASSESSMENT — PAIN DESCRIPTION - PAIN TYPE: TYPE: SURGICAL PAIN

## 2023-02-12 ASSESSMENT — PAIN SCALES - GENERAL
PAINLEVEL_OUTOF10: 0
PAINLEVEL_OUTOF10: 2
PAINLEVEL_OUTOF10: 2
PAINLEVEL_OUTOF10: 4
PAINLEVEL_OUTOF10: 2
PAINLEVEL_OUTOF10: 4

## 2023-02-12 ASSESSMENT — PAIN DESCRIPTION - DESCRIPTORS
DESCRIPTORS: ACHING
DESCRIPTORS: ACHING

## 2023-02-12 ASSESSMENT — PAIN DESCRIPTION - ORIENTATION
ORIENTATION: ANTERIOR
ORIENTATION: ANTERIOR

## 2023-02-12 NOTE — PROGRESS NOTES
CARDIAC SURGERY PROGRESS NOTE    2023  8:32 AM     CC:  Post Operative Day # 6     Chart, images and labs reviewed. Discussed with available staff. Rounded with Dr. Anatoliy Mercedes. Interval History/Events of Past 24 hours:   Walks in [place] without supplemental oxygen. Off tip, 1.4 l UO with nl creat, + BM    Subjective:  Patient seen and examined on rounds today. No complaint  Pain level: minimal    BM: Yes    Objective:  Vital signs:   /70   Pulse 83   Temp 98.6 °F (37 °C) (Oral)   Resp 16   Ht 5' 6\" (1.676 m)   Wt 163 lb 6.4 oz (74.1 kg)   SpO2 98%   BMI 26.37 kg/m²   Temp (24hrs), Av.6 °F (37 °C), Min:98.2 °F (36.8 °C), Max:98.8 °F (37.1 °C)    Admission Weight: Last Weight   Weight: 166 lb (75.3 kg) Weight: 163 lb 6.4 oz (74.1 kg)     Telemetry: SR 93    Physical Examination:     General:  Alert, oriented   Lungs: Clear to auscultation without rales, wheezes or rhonchi. Chest: Dressings clean and dry. Midline incision healing well. Sternum stable. Heart: regular rate and rhythm, No murmur. Abdomen: Soft and non-tender without masses. Bowel sounds present. Extremities: Warm and well perfused. Edema moderate. Incisions: clean, dry, no drainage. Neuro:          Labs:  Lab Results   Component Value Date/Time    WBC 8.7 2023 03:45 AM    HCT 25.7 (L) 2023 03:45 AM     2023 03:45 AM      Lab Results   Component Value Date/Time     2023 03:45 AM    K 3.2 (L) 2023 03:45 AM     (H) 2023 03:45 AM    CO2 22 2023 03:45 AM    BUN 15 2023 03:45 AM     CXR: no ptx, hazy left base     Assessment:  S/P  CABG x 3  Respiratory parameters  Stable  Cardiac status Stable     Plans:  1. Start BB, half midodrine dose tomorrow  2. IS and walking  3. Home tomorrow. Heart Hugger use is encouraged to mitigate pain and will also assist with deep breathing and incentive spirometry goals. Possible discharge 1 days.     [address], [place]    PLEASE NOTE:  This document has been produced using voice recognition software. Unrecognized errors in transcription may be present. NOTE TO PATIENT:  The purpose of this note is to communicate optimally with the other providers involved in your care. It is written using standard medical terminology. If you have questions regarding details of the note please call my office at [phone] and make an appointment to discuss your concerns.

## 2023-02-12 NOTE — PROGRESS NOTES
Occupational Therapy Goals:  Initiated 2/12/2023 to be met within 7-10 days. Short Term Goals  Short Term Goal 1: 1. Patient will perform upper body dressing with modified independence. 2.  Patient will perform lower body dressing with supervision/set-up with AE as needed. 3.  Patient will perform functional bed mobility in prep for self care with supervision/set-up. 4.  Patient will perform toilet transfers with supervision/set-up. 5.  Patient will perform all aspects of toileting with supervision/set-up. 6.  Patient will participate in upper extremity therapeutic exercise/activities with supervision/set-up for 8 minutes. 7.  Patient will utilize energy conservation techniques/sternal precautions during functional activities with verbal cues. Prior Level of Function:Pt reports independence      OCCUPATIONAL THERAPY TREATMENT    Patient: Ebony Farrar (60 y.o. female)  Date: 2/12/2023  Diagnosis: Chest pain, unspecified type [R07.9]  CAD (coronary artery disease), native coronary artery [I25.10] <principal problem not specified>      Precautions:  PLOF:  Pt reports independence     Chart, occupational therapy assessment, plan of care, and goals were reviewed. ASSESSMENT:  Pt cleared by RN for OT tx at this time. Pt presented sitting upright in chair upon entry, son present, and agreeable to work with OT. Reviewed at length sternal precautions with pt and issued visual handout for continues review for optimal carryover. Pt educated on mult energy conservation techniques including pacing and deep breathing to prevent SOB and fatigue, to increase activity tolerance and safety w/ADLs and functional mobility, pt verbalized understanding. Assessed pt's vitals at end of tx session and WNL. She was left with B LE's elevated and all needs left within reach. RN made aware.      Progression toward goals:  []          Improving appropriately and progressing toward goals  [x]          Improving slowly and progressing toward goals  []          Not making progress toward goals and plan of care will be adjusted     PLAN:  Patient continues to benefit from skilled intervention to address the above impairments. Continue treatment per established plan of care. Further Equipment Recommendations for Discharge: RW and Shower chair    Discharge Recommendation: Discharge Recommendations: Home with Home health OT    AMPAC: Current research shows that an AM-PAC score of 18 or greater is associated with a discharge to the patient's home setting. Based on an AM-PAC score of 18/24 and their current ADL deficits; it is recommended that the patient have 2-3 sessions per week of Occupational Therapy at d/c to increase the patient's independence. This AMPAC score should be considered in conjunction with interdisciplinary team recommendations to determine the most appropriate discharge setting. Patient's social support, diagnosis, medical stability, and prior level of function should also be taken into consideration. SUBJECTIVE:   Patient stated  \"  I just got back into the chair. \"     OBJECTIVE DATA SUMMARY:   Cognitive/Behavioral Status:  Orientation Level: Oriented X4        Pain:  Pain level pre-treatment: 0/10   Pain level post-treatment: 0/10      Activity Tolerance:    Activity Tolerance: Patient tolerated treatment well  Please refer to the flowsheet for vital signs taken during this treatment. After treatment:   [x]  Patient left in no apparent distress sitting up in chair  []  Patient left in no apparent distress in bed  [x]  Call bell left within reach  [x]  Nursing notified  [x]  Caregiver present  []  Bed alarm activated    COMMUNICATION/EDUCATION:   Patient Education  Education Given To: Patient; Family  Education Provided: Role of Therapy;Plan of Care;ADL Adaptive Strategies; Energy Conservation  Education Method: Demonstration;Verbal;Teach Back  Education Outcome: Verbalized understanding;Continued education needed      Thank you for this referral.  Nicole Overall, SHANTANU  Minutes: [facility] AM-PAC® Daily Activity Inpatient Short Form (6-Clicks)    How much HELP from another person does the patient currently need    (If the patient hasn't done an activity recently, how much help from another person do you think he/she would need if he/she tried?)   Total (Total A or Dep)   A Lot  (Mod to Max A)   A Little (Sup or Min A)   None (Mod I to I)   Putting on and taking off regular lower body clothing? [] 1 [x] 2 [] 3 [] 4   2. Bathing (including washing, rinsing,      drying)? [] 1 [x] 2 [] 3 [] 4   3. Toileting, which includes using toilet, bedpan or urinal?   [] 1 [] 2 [x] 3 [] 4   4. Putting on and taking off regular upper body clothing? [] 1 [] 2 [x] 3 [] 4   5. Taking care of personal grooming such as brushing teeth? [] 1 [] 2 [] 3 [x] 4   6. Eating meals?    [] 1 [] 2 [] 3 [x] 4

## 2023-02-12 NOTE — PROGRESS NOTES
1900-Bedside and Verbal shift change report given to This Writer, RN by Andrés Plaza,  Report included the following information Nurse Handoff Report, Adult Overview, Intake/Output, Recent Results, Cardiac Rhythm  , and Quality Measures. Care assumed of pt lying in bed on phone and no complaints. 2100-No apparent distress. No complaints. Continue to monitor for changes. 0345-AM Labs completed    0530-CXR portable done. AM bath given and pt verbalizes getting some sleep last night. 0600-OOB up in recliner chair at bedside with use of Heart Hugger and uses Triflow independently and effectively to goal of 1000 ml.     0700-Bedside and Verbal shift change report given to Braulio Bañuelos RN  by This Writer, RN . Report included the following information Nurse Handoff Report, Adult Overview, Intake/Output, Cardiac Rhythm  , and Quality Measures. Continues to sit up in recliner chair with call bell and no complaints.

## 2023-02-12 NOTE — H&P
I saw and examined the patient. I agree with history and physical examination as well as assessment documented below. 66-year-old patient with diabetes of long duration on oral antidiabetics, hypertension, hyperlipidemia. Patient was being worked up for chest pressure with activity in the last 2 weeks. The patient states that the intensity of the pain has increased and the amount of exercise she has to do has decreased in the last 2 weeks. Patient had left heart catheterization with left and right coronary angiography today by Dr. Sha Lujan. I had the chance to review images personally in the cardiac catheterization laboratory and discuss it with Dr. Justyna Milner as well as Dr. Khang Hinkle. Patient has significant ostial left mainstem coronary artery stenosis which appears to be an irregular plaque and hazy lesion in character. Patient also has significant calcification of the proximal LAD over a long segment at the takeoff of a moderate-sized diagonal branch. Right coronary has multiple lesions. All 3 coronary systems are acceptable targets for revascularization. Left ventricular ejection fraction appears to be well preserved on the left ventriculogram.  An echocardiogram is pending. All things considered, having significant left mainstem and right coronary artery stenosis, diabetes mellitus, increasing symptomatic status, patient will benefit from coronary artery bypass graft surgery. I spoke to the patient in great detail regarding the extent of coronary artery disease, treatment options including continuing medical therapy, percutaneous coronary interventions, surgical revascularization was discussed in great detail. Risks and benefits of all 3 treatment options was discussed with the patient in great detail. The procedure coronary artery bypass graft surgery, postprocedure hospital stay, recuperation after discharge from the hospital was discussed with the patient in great detail.   Risks of the procedure including but not limited to infection the sternotomy incision which might require debridement, bleeding which may require blood transfusion in the reexploration operating, preoperative myocardial infarction, preoperative stroke, perioperative death was discussed with the patient in great detail. Patient understands and she is ready to proceed. I answered all of her questions to her satisfaction. The patient will have a CT scan of the chest without contrast, bilateral carotid duplex ultrasounds as well as bilateral lower extremity venous mapping in preparation for coronary artery bypass grafting. Jyoti Hicks is a 79 y.o. female who is being seen on consult for CAD, at  Dr. Maylin Villalta. Frias's request.           Assessment:   3VCAD w/ LM ds  Obesity BMI > 30  HTN  DM  HLD  Hypothyroidism  H/o varicose veins in legs             Patient Active Problem List     Diagnosis Date Noted    CAD (coronary artery disease), native coronary artery 02/03/2023    Cellulitis of right foot due to methicillin-resistant Staphylococcus aureus 02/03/2023    Essential hypertension 02/03/2023    Mixed hyperlipidemia 02/03/2023    Osteoarthrosis 02/03/2023    Obesity 02/03/2023    Type 2 diabetes mellitus without complication ([facility] [address] 00/37/0852    Varicose veins of left lower extremity 02/03/2023    Fatty (change of) liver, not elsewhere classified 01/31/2012    Fibromyalgia 01/31/1995    Hypothyroidism 01/30/1991      Cardiac risk factors:  dyslipidemia, diabetes mellitus, obesity,  hypertension, stress, thyroid dysfunction  Plan:      Would benefit from CABG, planned for 2/6/23  Hold ACEi/ARB to avoid perioperative vasoplegic syndrome. Will clarify COVID vaccination status  Will also need the following tests to complete the workup and risk stratification.   COVID rule out  Type and Cross 2 units PRBC, 1 unit PLT  Lipid panel  Liver function tests  TSH  HbA1c  EKG  CXR  Chest CT scan  Echocardiogram  Carotid duplex scanning  Venous mapping and marking h/o varicose vein  Amiodarone for Atrial Fibrillation prophylaxis      Subjective:   No chief complaint on file. Chest pain     History of Present Illness:   Nadeem Eastman is a 79 y.o. female with history of HTN, HLD, DM, hpothyroidism who is having increasing angina with activity. The chest pain pressure is moderate and is located substernally without radiation to the arms. It is associated with exertion It has been present for 2 weeks, and is increasing in frequency and severity, and is not relieved by rest and does not occur at rest. She denies claudication, dizziness, dyspnea, irregular heart beats, lower extremity edema, near-syncope. She had elective cardiac cath today and was found to have 3VCAD with Left main disease. She will be admitted for surgical revascularization. Past Medical History:   Diagnosis Date    Diabetes ([address]      Fibromyalgia      HTN (hypertension)      Hypothyroid      Nausea & vomiting      Obesity      Varicose veins of both lower extremities                 Past Surgical History:   Procedure Laterality Date    COLONOSCOPY N/A 10/7/2020     COLONOSCOPY, biopsy, polypectomies performed by Imani Juarez MD at [facility] ENDOSCOPY    HX CHOLECYSTECTOMY        HX COLONOSCOPY         age 48    HX GYN         colposcopy and portion cervix removed    HX HEENT         tonsillectomy    HX HERNIA REPAIR         abdominal         Social History:   She  reports that she has never smoked. She has never used smokeless tobacco.  She  reports no history of alcohol use. She lives with her  who is a . Family History:   No family history pertinent to CAD. Allergies and Intolerances:            Allergies   Allergen Reactions    Compazine [Prochlorperazine Edisylate] Angioedema    Aspirin Hives         Home Medications:                Current Facility-Administered Medications   Medication Dose Route Frequency Provider Last Rate Last Admin    0.9% sodium chloride infusion  100 mL/hr IntraVENous CONTINUOUS Rafat Martinez  mL/hr at 23 0840 100 mL/hr at 23 0840    sodium chloride (NS) flush 5-40 mL  5-40 mL IntraVENous Q8H Lorena Baltazar PA-C        sodium chloride (NS) flush 5-40 mL  5-40 mL IntraVENous PRN Comeyne, Loyd Gitelman, PA-C        mupirocin (BACTROBAN) 2 % ointment   Both Nostrils BID Troy Prater PA-C        [START ON 2023] ceFAZolin (ANCEF) 2 g in sterile water (preservative free) 20 mL IV syringe  2 g IntraVENous ON CALL TO OR Comeyne, Loyd Gitelman, PA-C        amiodarone (CORDARONE) tablet 400 mg  400 mg Oral TID WITH MEALS Comeyne, Loyd Gitelman, PA-C         Followed by    Gm Petit ON 2023] amiodarone (CORDARONE) tablet 200 mg  200 mg Oral BID WITH MEALS Comeyne, Loyd Gitelman, PA-C          Prior to Admission Medications   Prescriptions Last Dose Informant Patient Reported? Taking? LEVOTHYROXINE SODIUM (LEVOTHYROXINE PO) 2/3/2023 at 0600   Yes Yes   Sig: Take 100 mcg by mouth daily. LISINOPRIL PO 2/3/2023 at 0600   Yes Yes   Sig: Take 10 mg by mouth daily. aspirin 81 mg chewable tablet 2022 at AM   Yes No   Si tablet   metFORMIN (GLUCOPHAGE) 500 mg tablet 2023 at AM   Yes No   Sig: Take  by mouth two (2) times daily (with meals). metoprolol succinate (TOPROL-XL) 25 mg XL tablet 2023 at AM   No No   Sig: Take 1 Tablet by mouth daily. nitroglycerin (NITROSTAT) 0.4 mg SL tablet Not Taking   No No   Si Tab by SubLINGual route every five (5) minutes as needed for Chest Pain. Up to 3 doses. Patient not taking: Reported on 2/3/2023      Facility-Administered Medications: None      No current outpatient medications on file.          Review of Systems: (NEGATIVE unless checked or in HPI.)   Cardiac:   [] Chest pain or chest pressure  [] Shortness of breath upon activity  [] Shortness of breath when lying flat  [] Irregular heart rhythm     Vascular:   [] Pain in calf, thigh, or hip brought on by walking  [] Pain in feet at night that wakes you up from your sleep  [] Blood clot in your veins  [] Leg swelling  [] bulging leg veins     Pulmonary:   [] Oxygen at home  [] Productive cough  [] Wheezing     Neurologic:   [] Sudden weakness in arms or legs  [] Sudden numbness in arms or legs  [] Sudden onset of difficult speaking or slurred speech  [] Temporary loss of vision in one eye  [] Problems with dizziness     Gastrointestinal:   [] Blood in stool  [] Vomited blood     Genitourinary:   [] Burning when urinating  [] Blood in urine     Psychiatric:   [] Major depression     Hematologic:   [] Bleeding problems  [] Problems with blood clotting  [] bulging leg veins  [] calf pain with exertion     Dermatologic:   [] Rashes or ulcers     Constitutional:   [] Fever or chills  [] weight gain or loss     Ear/Nose/Throat:   [] Dentition   [] Change in hearing  [] Nose bleeds  [] Sore throat     Musculoskeletal:   [] Back pain  [] Joint pain  [] Muscle pain     Physical Examination:   Visit Vitals  BP (!) 152/74   Pulse 69   Temp 98.1 °F (36.7 °C)   Resp 11   Ht 5' 6\" (1.676 m)   Wt 72.6 kg (160 lb)   SpO2 97%   Breastfeeding No   BMI 25.82 kg/m²      PHYSICAL EXAMINATION:  Vital signs:       BP Readings from Last 3 Encounters:   23 (!) 152/74   23 (!) 186/93   20 (!) 122/90      Temp (24hrs), Av.1 °F (36.7 °C), Min:98.1 °F (36.7 °C), Max:98.1 °F (36.7 °C)         Wt Readings from Last 3 Encounters:   23 72.6 kg (160 lb)   23 75.3 kg (166 lb)   10/28/20 71.7 kg (158 lb)          Ht Readings from Last 3 Encounters:   23 5' 6\" (1.676 m)   10/28/20 5' (1.524 m)   10/01/20 5' (1.524 m)      Body surface area is 1.84 meters squared. General:   awake alert and oriented   Skin:   no rashes or skin lesions noted on limited exam   HEENT:  Normocephalic, atraumatic, PERRL, EOMI, no scleral icterus or pallor; no conjunctival hemmohage;  nasal and oral mucous are moist and without evidence of lesions. No thrush. Neck supple, no bruits. Lymph Nodes:   no cervical, axillary or inguinal adenopathy   Lungs:   non-labored, bilaterally clear to aspiration- no crackles wheezes rales or rhonchi   Heart:  RRR, s1 and s2; no murmurs rubs or gallops, no edema, + pedal pulses   Abdomen:  soft, non-distended, active bowel sounds, no hepatomegaly, no splenomegaly. Appropriate surgical scars for stated surgeries. Non-tender   Genitourinary:  deferred   Extremities:   no clubbing, cyanosis; no joint effusions or swelling; Full ROM of all large joints to the upper and lower extremities; muscle mass appropriate for age   Neurologic:  No gross focal sensory abnormalities; 5/5 muscle strength to upper and lower extremities. Speech appropirate. Cranial nerves intact   Psychiatric:   appropriate and interactive. Laboratory Data:            Lab Results   Component Value Date/Time     WBC 5.7 02/01/2023 01:10 PM     HGB 12.6 02/01/2023 01:10 PM     HCT 38.3 02/01/2023 01:10 PM     PLATELET 239 09/70/8087 01:10 PM            Lab Results   Component Value Date/Time     Sodium 139 02/01/2023 01:10 PM     Potassium 4.0 02/01/2023 01:10 PM     Chloride 107 02/01/2023 01:10 PM     CO2 28 02/01/2023 01:10 PM     Glucose 163 (H) 02/01/2023 01:10 PM     BUN 13 02/01/2023 01:10 PM     Creatinine 0.67 02/01/2023 01:10 PM            Lab Results   Component Value Date/Time     Cholesterol, total 207 (H) 10/15/2013 05:28 PM     HDL Cholesterol 47 10/15/2013 05:28 PM     LDL, calculated 127.6 (H) 10/15/2013 05:28 PM     Triglyceride 162 (H) 10/15/2013 05:28 PM            Lab Results   Component Value Date/Time     Hemoglobin A1c 7.8 (H) 10/01/2013 02:01 PM          Recent Labs     02/01/23  1310   CA 8.7   ALB 3.9   TP 6.4   TBILI 0.5      ABG:  No results found for: PH, PHI, PCO2, PCO2I, PO2, PO2I, HCO3, HCO3I, FIO2, FIO2I  No results for input(s): TROIQ, CPK, CKMB in the last 72 hours.         Lab Results   Component Value Date/Time     TSH 4.76 10/15/2013 05:28 PM         EKG: (independently reviewed)   EKG Results         None             ECHO: 11/16/18     ECHO ADULT COMPLETE 11/16/2018 11/16/2018     Interpretation Summary  · Left ventricular low normal systolic function. Estimated left ventricular ejection fraction is 51 - 55%. Visually measured ejection fraction. Normal left ventricular wall motion, no regional wall motion abnormality noted. Slight septal bounce noted. Age-appropriate left ventricular diastolic function. · Mild aortic valve sclerosis with no significant stenosis. · Trace mitral valve regurgitation. · Mild tricuspid valve regurgitation is present. There is no evidence of pulmonary hypertension. · Mildly elevated central venous pressure (5-10 mmHg); IVC diameter is less than 21 mm and collapses less than 50% with respiration.      Signed by: Edwar Peace MD on 11/16/2018  1:59 PM        CATHETERIZATION:   Completed but no report     Nadine Croft PA-C  [facility]  [phone]

## 2023-02-13 ENCOUNTER — APPOINTMENT (OUTPATIENT)
Facility: HOSPITAL | Age: 68
DRG: 234 | End: 2023-02-13
Attending: INTERNAL MEDICINE
Payer: MEDICARE

## 2023-02-13 VITALS
HEART RATE: 71 BPM | RESPIRATION RATE: 17 BRPM | WEIGHT: 163.5 LBS | TEMPERATURE: 98.5 F | SYSTOLIC BLOOD PRESSURE: 130 MMHG | BODY MASS INDEX: 26.28 KG/M2 | OXYGEN SATURATION: 97 % | HEIGHT: 66 IN | DIASTOLIC BLOOD PRESSURE: 78 MMHG

## 2023-02-13 DIAGNOSIS — R06.09 DOE (DYSPNEA ON EXERTION): Primary | ICD-10-CM

## 2023-02-13 DIAGNOSIS — R07.9 CHEST PAIN, UNSPECIFIED TYPE: ICD-10-CM

## 2023-02-13 DIAGNOSIS — I10 ESSENTIAL (PRIMARY) HYPERTENSION: ICD-10-CM

## 2023-02-13 DIAGNOSIS — I25.10 CORONARY ARTERY DISEASE INVOLVING NATIVE CORONARY ARTERY WITHOUT ANGINA PECTORIS, UNSPECIFIED WHETHER NATIVE OR TRANSPLANTED HEART: ICD-10-CM

## 2023-02-13 LAB
ANION GAP SERPL CALC-SCNC: 5 MMOL/L (ref 3–18)
BUN SERPL-MCNC: 12 MG/DL (ref 7–18)
BUN/CREAT SERPL: 19 (ref 12–20)
CA-I SERPL-SCNC: 1.21 MMOL/L (ref 1.15–1.33)
CALCIUM SERPL-MCNC: 8.4 MG/DL (ref 8.5–10.1)
CHLORIDE SERPL-SCNC: 112 MMOL/L (ref 100–111)
CO2 SERPL-SCNC: 25 MMOL/L (ref 21–32)
CREAT SERPL-MCNC: 0.62 MG/DL (ref 0.6–1.3)
EKG ATRIAL RATE: 84 BPM
EKG DIAGNOSIS: NORMAL
EKG P AXIS: 31 DEGREES
EKG P-R INTERVAL: 150 MS
EKG Q-T INTERVAL: 398 MS
EKG QRS DURATION: 80 MS
EKG QTC CALCULATION (BAZETT): 470 MS
EKG R AXIS: 11 DEGREES
EKG T AXIS: 60 DEGREES
EKG VENTRICULAR RATE: 84 BPM
GLUCOSE BLD STRIP.AUTO-MCNC: 178 MG/DL (ref 70–110)
GLUCOSE BLD STRIP.AUTO-MCNC: 99 MG/DL (ref 70–110)
GLUCOSE SERPL-MCNC: 94 MG/DL (ref 74–99)
MAGNESIUM SERPL-MCNC: 1.7 MG/DL (ref 1.6–2.6)
POTASSIUM SERPL-SCNC: 4 MMOL/L (ref 3.5–5.5)
SODIUM SERPL-SCNC: 142 MMOL/L (ref 136–145)

## 2023-02-13 PROCEDURE — 99024 POSTOP FOLLOW-UP VISIT: CPT | Performed by: PHYSICIAN ASSISTANT

## 2023-02-13 PROCEDURE — APPNB180 APP NON BILLABLE TIME > 60 MINS: Performed by: PHYSICIAN ASSISTANT

## 2023-02-13 PROCEDURE — 71045 X-RAY EXAM CHEST 1 VIEW: CPT

## 2023-02-13 PROCEDURE — 6360000002 HC RX W HCPCS: Performed by: PHYSICIAN ASSISTANT

## 2023-02-13 PROCEDURE — 82962 GLUCOSE BLOOD TEST: CPT

## 2023-02-13 PROCEDURE — 83735 ASSAY OF MAGNESIUM: CPT

## 2023-02-13 PROCEDURE — 80048 BASIC METABOLIC PNL TOTAL CA: CPT

## 2023-02-13 PROCEDURE — 2580000003 HC RX 258: Performed by: PHYSICIAN ASSISTANT

## 2023-02-13 PROCEDURE — 94761 N-INVAS EAR/PLS OXIMETRY MLT: CPT

## 2023-02-13 PROCEDURE — 6370000000 HC RX 637 (ALT 250 FOR IP): Performed by: PHYSICIAN ASSISTANT

## 2023-02-13 PROCEDURE — 82330 ASSAY OF CALCIUM: CPT

## 2023-02-13 RX ORDER — MIDODRINE HYDROCHLORIDE 5 MG/1
5 TABLET ORAL
Status: DISCONTINUED | OUTPATIENT
Start: 2023-02-13 | End: 2023-02-13 | Stop reason: HOSPADM

## 2023-02-13 RX ORDER — LEVOTHYROXINE SODIUM 0.1 MG/1
100 TABLET ORAL
Qty: 30 TABLET | Refills: 3 | Status: SHIPPED | OUTPATIENT
Start: 2023-02-14

## 2023-02-13 RX ORDER — ATORVASTATIN CALCIUM 20 MG/1
20 TABLET, FILM COATED ORAL NIGHTLY
Qty: 30 TABLET | Refills: 3 | Status: SHIPPED | OUTPATIENT
Start: 2023-02-13

## 2023-02-13 RX ORDER — ASPIRIN 81 MG/1
81 TABLET ORAL DAILY
Qty: 30 TABLET | Refills: 3 | Status: SHIPPED | OUTPATIENT
Start: 2023-02-13

## 2023-02-13 RX ORDER — CLOPIDOGREL BISULFATE 75 MG/1
75 TABLET ORAL DAILY
Qty: 30 TABLET | Refills: 3 | Status: SHIPPED | OUTPATIENT
Start: 2023-02-13

## 2023-02-13 RX ORDER — TRAMADOL HYDROCHLORIDE 50 MG/1
50 TABLET ORAL EVERY 6 HOURS PRN
Qty: 28 TABLET | Refills: 0 | Status: SHIPPED | OUTPATIENT
Start: 2023-02-13 | End: 2023-02-20

## 2023-02-13 RX ADMIN — SODIUM CHLORIDE, PRESERVATIVE FREE 10 ML: 5 INJECTION INTRAVENOUS at 08:21

## 2023-02-13 RX ADMIN — CHLORHEXIDINE GLUCONATE 0.12% ORAL RINSE 15 ML: 1.2 LIQUID ORAL at 08:16

## 2023-02-13 RX ADMIN — SODIUM CHLORIDE, PRESERVATIVE FREE 10 ML: 5 INJECTION INTRAVENOUS at 00:00

## 2023-02-13 RX ADMIN — HEPARIN SODIUM 5000 UNITS: 5000 INJECTION INTRAVENOUS; SUBCUTANEOUS at 06:11

## 2023-02-13 RX ADMIN — FAMOTIDINE 20 MG: 20 TABLET, FILM COATED ORAL at 08:17

## 2023-02-13 RX ADMIN — THERA TABS 1 TABLET: TAB at 08:16

## 2023-02-13 RX ADMIN — INSULIN GLARGINE 25 UNITS: 100 INJECTION, SOLUTION SUBCUTANEOUS at 08:19

## 2023-02-13 RX ADMIN — INSULIN LISPRO 2 UNITS: 100 INJECTION, SOLUTION INTRAVENOUS; SUBCUTANEOUS at 11:58

## 2023-02-13 RX ADMIN — LEVOTHYROXINE SODIUM 100 MCG: 100 TABLET ORAL at 06:10

## 2023-02-13 RX ADMIN — ACETAMINOPHEN 1000 MG: 500 TABLET ORAL at 11:57

## 2023-02-13 RX ADMIN — AMIODARONE HYDROCHLORIDE 200 MG: 200 TABLET ORAL at 08:17

## 2023-02-13 RX ADMIN — ACETAMINOPHEN 1000 MG: 500 TABLET ORAL at 04:06

## 2023-02-13 RX ADMIN — METOPROLOL TARTRATE 12.5 MG: 25 TABLET, FILM COATED ORAL at 08:17

## 2023-02-13 RX ADMIN — CLOPIDOGREL BISULFATE 75 MG: 75 TABLET ORAL at 08:17

## 2023-02-13 RX ADMIN — ASPIRIN 81 MG: 81 TABLET, COATED ORAL at 08:16

## 2023-02-13 ASSESSMENT — PAIN DESCRIPTION - LOCATION: LOCATION: GENERALIZED

## 2023-02-13 ASSESSMENT — PAIN SCALES - GENERAL
PAINLEVEL_OUTOF10: 0
PAINLEVEL_OUTOF10: 5

## 2023-02-13 ASSESSMENT — PAIN DESCRIPTION - PAIN TYPE: TYPE: CHRONIC PAIN

## 2023-02-13 ASSESSMENT — PAIN DESCRIPTION - DESCRIPTORS: DESCRIPTORS: ACHING

## 2023-02-13 NOTE — DISCHARGE SUMMARY
CARDIAC SURGERY DISCHARGE SUMMARY    2/13/2023      CHIEF COMPLAINT: chest pain      HISTORY OF PRESENT ILLNESS:  Nadeem Eastman is a 76 y.o. female patient of Dr. Hugo Gotti who presented progressive  angina. Cardiac catheterization showed  3-vessel CAD. She was admitted for surgical coronary revascularization. PAST MEDICAL HISTORY:   Past Medical History:   Diagnosis Date    Diabetes ([address]     Fibromyalgia     HTN (hypertension)     Hypothyroid     Nausea & vomiting     Obesity     S/P CABG x 3 2/6/2023    CABG x3 (LIMA-LAD, rSVG-OM, rSVG-PDA) - Dr. Consuelo Torres    Varicose veins of both lower extremities    . PAST SURGICAL HISTORY:   Past Surgical History:   Procedure Laterality Date    CHOLECYSTECTOMY      COLONOSCOPY      age 48    COLONOSCOPY N/A 10/7/2020    COLONOSCOPY, biopsy, polypectomies performed by Imani Juarez MD at [place] [address]      colposcopy and portion cervix removed    HEENT      tonsillectomy    HERNIA REPAIR      abdominal   .    HOSPITAL COURSE:  She was admitted to the hospital and underwent CABG x 3 on 2/6. She was extubated on the first night following surgery and was eventually up and ambulating well and taking a diet well. Wires and tubes were removed. Notable postoperative events included . She is to be discharged to  Home today. At the time of discharge, her lungs were clear to auscultation bilaterally and the heart had a regular rate and rhythm. The sternum was stable and all wounds were well healed with no evidence of infection. There was no pedal edema. Room air oximetry was 98%.     LABS:  Lab Results   Component Value Date/Time    WBC 8.7 02/12/2023 03:45 AM    HCT 25.7 (L) 02/12/2023 03:45 AM     02/12/2023 03:45 AM      Lab Results   Component Value Date/Time     02/13/2023 05:15 AM    K 4.0 02/13/2023 05:15 AM     (H) 02/13/2023 05:15 AM    CO2 25 02/13/2023 05:15 AM    BUN 12 02/13/2023 05:15 AM       Imaging:  CT CHEST WO CONTRAST    Result Date: 2/3/2023  CT CHEST UNENHANCED CPT code: 61800 INDICATION: Aortic calcification. Preoperative study. TECHNIQUE: 5 mm collimation axial images obtained from the thoracic inlet to the level of the diaphragm without intravenous contrast. All CT scans at this facility are performed using dose optimization technique as appropriate to this specific exam, to include automated exposure control, adjustment of the mA and/or KP according to patient size or use of iterative reconstruction techniques. COMPARISON: X-ray same day. CHEST FINDINGS: Heart size normal. Diffuse bilateral coronary artery calcifications. Thoracic aorta is nonaneurysmal. Ascending aorta measures maximum 3.8 cm. Transverse arch measures 3.1 cm. Mid descending aorta 2.8 cm. There is a very small amount of scattered atherosclerotic wall calcification of the mid to distal aortic arch and descending aorta. Minimal punctate and linear calcification at the proximal ascending aorta. No enlarged axillary or mediastinal lymph nodes. No pleural effusion. No calcified pleural plaques. Central airway is patent. No significant debris. No pneumothorax. No overt pulmonary edema. No focal infiltrate or consolidation. No significant bullous or fibrotic disease. Minimal latent atelectasis at the lung bases. Small hiatal hernia. High density in the bilateral partially included renal calyces upper pole could reflect some calcification or excretion of contrast recently administered. Correlation needed in this respect. Degenerative change subjectively commensurate with patient age. 1. Nonaneurysmal thoracic aorta. Mild degree of wall calcification as detailed above. 2. No acute pulmonary abnormalities. Minimal presumed dependent atelectasis at the lung bases. Transthoracic echocardiogram (TTE) complete with contrast, bubble, strain, and 3D PRN    Result Date: 2/3/2023  This is a summary report.  The complete report is available in the patient's medical record. If you cannot access the medical record, please contact the sending organization for a detailed fax or copy. Contrast used: Definity to enhance endocardial border definition. Left Ventricle: Low normal left ventricular systolic function with a visually estimated EF of 50 - 55%. Left ventricle size is normal. LVIDd is 4.2 cm. Mild septal thickening. Normal wall motion. Normal diastolic function. Right Ventricle: Right ventricle is mildly dilated. Low normal systolic function. TAPSE is 1.7 cm. RV Peak S' is 10 cm/s. Aortic Valve: Trace to mild regurgitation. No stenosis. Mitral Valve: Trace regurgitation. No stenosis noted. Tricuspid Valve: Mild regurgitation. Normal RVSP. The estimated RVSP is 22 mmHg. Aorta: Normal sized aortic root and aortic arch. Ao Root diameter is 2.9 cm. Dilated ascending aorta. Ao Ascending diameter is 3.9 cm. Descending aorta is not well-visualized. Vascular duplex carotid bilateral    Result Date: 2/4/2023  This is a summary report. The complete report is available in the patient's medical record. If you cannot access the medical record, please contact the sending organization for a detailed fax or copy. · The right CCA is patent. There is intimal thickening present in the right CCA. There is mild stenosis in the right ICA (<50%) and at the proximal segment. The right ICA has heterogeneous plaque. The right ECA is patent. The right vertebral is antegrade. The right subclavian is normal. · The left CCA is patent. There is intimal thickening present in the left CCA. There is mild stenosis in the left ICA (<50%) and at the proximal segment . The left ICA has heterogeneous plaque. The left ECA is patent. The left vertebral is antegrade. The left subclavian is normal.      Vascular duplex vein mapping lower bilateral    Result Date: 2/4/2023  This is a summary report. The complete report is available in the patient's medical record.  If you cannot access the medical record, please contact the sending organization for a detailed fax or copy. · Patent bilateral greater saphenous and small saphenous veins. · Varicose vein(s) noted throughout the left lower extremity. Varicose veins are patent. · Please see measurements below. Cardiac procedure    Result Date: 2/3/2023  This is a summary report. The complete report is available in the patient's medical record. If you cannot access the medical record, please contact the sending organization for a detailed fax or copy. LM: Ostial calcified angiographic 50% plaque LAD: Calcified, mid LAD at diagonal bifurcation approximately 50% moderate disease. Ostial diagonal 40-50% disease otherwise normal LCx: 30-40% stenosis otherwise normal OM1: Large-caliber vessel without any obstructive disease RCA: Proximal and mid has serial lesion up to 80-90%. Distal vessel without any disease LVEF approximately 55% LVEDP 4 mmHg Images reviewed with CT surgery team as well as my cardiology colleague and plan is to consider surgical evaluation for CABG for left main and RCA disease    DISCHARGE DIAGNOSES:    CAD s/p CABG  HTN  HLD  DM2  Hypothryroidism      DISCHARGE DISPOSITION:  Activities: normal, with strict sternal precautions including no heavy lifting and with constant wearing of the sternal harness. Diet: cardiac diet and diabetic diet  Condition: good  Prognosis:  good    DISCHARGE INSTRUCTIONS:  She is to follow up in the Cardiac Surgery clinic in 7 days and 3 weeks, and will see the primary care physician and cardiologist preferably within one month. The Home health visiting nurses will see her once home.        Follow-up appointments:      DISCHARGE MEDICATIONS:       Medication List        START taking these medications      aspirin 81 MG EC tablet  Take 1 tablet by mouth daily     atorvastatin 20 MG tablet  Commonly known as: LIPITOR  Take 1 tablet by mouth nightly     clopidogrel 75 MG tablet  Commonly known as: PLAVIX  Take 1 tablet by mouth daily     levothyroxine 100 MCG tablet  Commonly known as: SYNTHROID  Take 1 tablet by mouth every morning (before breakfast)  Start taking on: February 14, 2023     metFORMIN 1000 MG tablet  Commonly known as: GLUCOPHAGE  Take 1 tablet by mouth 2 times daily (with meals)     metoprolol tartrate 25 MG tablet  Commonly known as: LOPRESSOR  Take 0.5 tablets by mouth 2 times daily Hold for BP < 110  Notes to patient: Check your blood pressure prior to taking this medication. DO NOT take this medication if your blood pressure (top number) is less than 110.     traMADol 50 MG tablet  Commonly known as: ULTRAM  Take 1 tablet by mouth every 6 hours as needed for Pain for up to 7 days.  Max Daily Amount: 200 mg               Where to Get Your Medications        These medications were sent to [address] [place] [id number]      Phone: [phone]   aspirin 81 MG EC tablet  atorvastatin 20 MG tablet  clopidogrel 75 MG tablet  levothyroxine 100 MCG tablet  metFORMIN 1000 MG tablet  metoprolol tartrate 25 MG tablet  traMADol 50 MG tablet         Johnna Degroot PA-C  [facility]  [phone]

## 2023-02-13 NOTE — PROGRESS NOTES
CT Surgery    NSR, BP ok  Ready for home today  Pt  and family to watch d/c video  Hold diuresis    Kimberlee Lima PA-C  [facility]  [phone]

## 2023-02-13 NOTE — PROGRESS NOTES
0700-received shift report from off going nurse Shawn Newton RN. Pt in recliner resting quietly with no complaints at this time. 0835-removed RIJ with single lumen with pt in [place] position. Pt tolerated. Minimal blood loss observed. 0930-pt ambulated in the hallway ~600 feet. Pt tolerated well.    1300-reviewed discharge instruction with patient and sister. Opportunity for questions given. No questions at this time. Armband removed and shredded.

## 2023-02-13 NOTE — PROGRESS NOTES
07:15  Received pt from [facility], [place].  Pt up in chair. A&O x 4. Denies pain. No IVF or drips infusing. VSS: 131/ 70, HR sinus rhythm 80, RR 27 /min, Sp02 98% on r/a. Wound Prevention Checklist    Patient: Yu Boyd (21 y.o. female)  Date: 2/12/2023  Diagnosis: Chest pain, unspecified type [R07.9]  CAD (coronary artery disease), native coronary artery [I25.10] <principal problem not specified>    Precautions:         []  Heel prevention boots placed on patient    []  Patient turned q2h during shift    []  Lift team ordered    [x]  Patient on Jordana bed/Specialty bed    []  Each Wound is documented during shift (Stage, Color, drainage, odor, measurements, and dressings)    [x]  Dual skin check done with [facility], RN    Mis Velazquez RN   08:30  [facility] level 3.2 - orders received to replace. 09:30  Pt ambulated to bathroom with one-person assist for 250 ml clear, yellow urine. Tolerating food and fluid without nausea/vomitting. .  13:20  Pt ambulated with rolator long route around nurses station (approx 360 ft) without dizziness, HR remained below 100 bpm during ambulation. RR peak at 38/ min,  No air hunger noted. Sp02 98% on r/a. Pt tolerated ambulation well. Family at bedside. 16:00  Pt denies need for pain medication outside scheduled q 6 hr tyenol. 18:30  Pt ambulated approx. 360 ft with stable VS, using rolator. Denies dizziness, dyspnea. Pt up to bathroom x 4 this shift with 2 medium Bms and total of 800 ml + 1 unmeasured void. 19:00  Bedside and Verbal shift change report given to Shelby Plasencia RN (oncoming nurse) by Mis Velazquez RN (offgoing nurse). Report included the following information Nurse Handoff Report, Adult Overview, Surgery Report, Intake/Output, MAR, Recent Results, Med Rec Status, and Cardiac Rhythm Sinus rhythm .

## 2023-02-13 NOTE — DISCHARGE INSTRUCTIONS
Please CALL Cardiac Surgery office with any questions or concerns [phone]. - Take all medications as directed. - Wear Heart Hugger daily.   - Shower daily. Wash incisions with soap and water and pat dry. Keep lower leg incision dressed while draining.   - No heavy lifting.   - No driving until cleared by MD and while taking pain medications. - Follow up in Cardiac Surgery clinic in 1 week and 3 weeks. - Follow up with PCP in one week and Cardiology in 3 weeks.

## 2023-02-21 ENCOUNTER — HOSPITAL ENCOUNTER (OUTPATIENT)
Facility: HOSPITAL | Age: 68
Discharge: HOME | End: 2023-02-24
Payer: MEDICARE

## 2023-02-21 ENCOUNTER — OFFICE VISIT (OUTPATIENT)
Dept: CARDIOTHORACIC SURGERY | Age: 68
End: 2023-02-21

## 2023-02-21 VITALS
SYSTOLIC BLOOD PRESSURE: 98 MMHG | HEART RATE: 76 BPM | BODY MASS INDEX: 25.71 KG/M2 | TEMPERATURE: 97.9 F | HEIGHT: 66 IN | DIASTOLIC BLOOD PRESSURE: 72 MMHG | WEIGHT: 160 LBS | OXYGEN SATURATION: 100 %

## 2023-02-21 DIAGNOSIS — Z95.1 S/P CABG X 3: ICD-10-CM

## 2023-02-21 DIAGNOSIS — Z95.1 S/P CABG X 3: Primary | ICD-10-CM

## 2023-02-21 PROCEDURE — 99024 POSTOP FOLLOW-UP VISIT: CPT | Performed by: PHYSICIAN ASSISTANT

## 2023-02-21 PROCEDURE — 71046 X-RAY EXAM CHEST 2 VIEWS: CPT

## 2023-02-21 RX ORDER — ROSUVASTATIN CALCIUM 10 MG/1
10 TABLET, COATED ORAL DAILY
COMMUNITY

## 2023-02-21 NOTE — PATIENT INSTRUCTIONS
Stop taking Metoprolol    Follow-up with Primary Care Provider and Cardiologist in the future as directed. Return to Cardiothoracic Surgery Clinic in approximately 2 weeks. Call [phone] with any questions. No heavy lifting, and wear sternal harness all the time.

## 2023-02-21 NOTE — PROGRESS NOTES
CARDIAC SURGERY FOLLOW-UP NOTE    2/22/2023    Subjective:   Leno Rudolph returns for a follow-up visit following CABG x 3 on 2/3/23 by Dr. Sotero Garza. Assessment:  Overall she is doing well following open-heart surgery. Marginal BP and has been holding metoprolol    Plans / Recommendations:   Stop Metoprolol  Follow-up with Primary Care Provider and Cardiologist in the future as directed. Has regularly scheduled appointment with Cardiothoracic Surgery clinic in two weeks. No heavy lifting, and wear sternal harness all the time. Patient verbalizes understanding and agreement with the plan. Reilly Domingo PA-C  [facility]  [phone]  _______________________________________________________________________________________________________________________________________________________  Subjective: The patient feels well. Angina is absent. Shortness of breath is absent. Sternal pain is absent. Stamina is improving, able to perform daily many activities without restriction. Eating well. Bowel movements regular.         Current medications include:  Current Outpatient Medications   Medication Sig Dispense Refill    rosuvastatin (CRESTOR) 10 MG tablet Take 10 mg by mouth daily      nitroGLYCERIN (NITROSTAT) 0.4 MG SL tablet Place 0.4 mg under the tongue      clopidogrel (PLAVIX) 75 MG tablet Take 1 tablet by mouth daily 30 tablet 3    aspirin 81 MG EC tablet Take 1 tablet by mouth daily 30 tablet 3    levothyroxine (SYNTHROID) 100 MCG tablet Take 1 tablet by mouth every morning (before breakfast) 30 tablet 3    metFORMIN (GLUCOPHAGE) 1000 MG tablet Take 1 tablet by mouth 2 times daily (with meals) 60 tablet 2    LISINOPRIL PO Take 10 mg by mouth daily (Patient not taking: Reported on 2/21/2023)      metFORMIN (GLUCOPHAGE) 500 MG tablet Take by mouth 2 times daily (with meals) (Patient not taking: Reported on 2/21/2023)      metoprolol succinate (TOPROL XL) 25 MG extended release tablet Take 25 mg by mouth daily (Patient not taking: Reported on 2/21/2023)       No current facility-administered medications for this visit. Objective:   Vital signs:    BP Readings from Last 3 Encounters:   02/21/23 98/72   02/13/23 130/78   02/10/23 (!) 109/57     Wt Readings from Last 3 Encounters:   02/21/23 160 lb (72.6 kg)   02/13/23 163 lb 8 oz (74.2 kg)   02/10/23 166 lb (75.3 kg)     @TMAX(24)@  Wt Readings from Last 3 Encounters:   02/21/23 160 lb (72.6 kg)   02/13/23 163 lb 8 oz (74.2 kg)   02/10/23 166 lb (75.3 kg)     Ht Readings from Last 3 Encounters:   02/21/23 5' 6\" (1.676 m)   02/10/23 5' 6\" (1.676 m)   02/09/23 5' 6\" (1.676 m)     Respiratory exam: clear b/l . Cardiac exam: regular rate and rhythm   Sternum: stable. Sternal wound: clean, dry, healing. Leg wounds: clean, dry, healing.     Pedal edema: absent        CXR: clear lungs bilaterally without significant residual effusion

## 2023-02-21 NOTE — CARE COORDINATION
Per Varun Galvan, pt was discharged on 2/13/23 and had home health and this was not processed. [address] and spoke with Tracie. She stated they will need new home health orders under IP consult home health and she will process the order.     Sent message to ALEXANDRA Galvan, PANKAJN RN  Care Management

## 2023-02-22 ENCOUNTER — HOME HEALTH ADMISSION (OUTPATIENT)
Age: 68
End: 2023-02-22

## 2023-03-03 ENCOUNTER — OFFICE VISIT (OUTPATIENT)
Dept: CARDIOTHORACIC SURGERY | Age: 68
End: 2023-03-03

## 2023-03-03 VITALS
WEIGHT: 161 LBS | OXYGEN SATURATION: 99 % | BODY MASS INDEX: 25.88 KG/M2 | TEMPERATURE: 97.7 F | DIASTOLIC BLOOD PRESSURE: 82 MMHG | HEART RATE: 100 BPM | SYSTOLIC BLOOD PRESSURE: 132 MMHG | HEIGHT: 66 IN

## 2023-03-03 DIAGNOSIS — Z95.1 S/P CABG X 3: Primary | ICD-10-CM

## 2023-03-03 PROCEDURE — 99024 POSTOP FOLLOW-UP VISIT: CPT | Performed by: PHYSICIAN ASSISTANT

## 2023-03-03 NOTE — PATIENT INSTRUCTIONS
You may stop wearing the white sternal harness next week. Continue all of your medications as scheduled. You are cleared to start Cardiac Rehab, which we recommend for our patients who have heart vessel bypass surgery. We will make a referral and they will be calling you to discuss the program.      You may begin regular exercise, working up over time to 30 minutes of vigorous each day. Please adhere to low-cholesterol, low-fat diet. If you need more information of what and what not to eat, please call our office and we will provide this for you. You are cleared to drive as of next week    You may resume all physical activities as your stamina allows, but be careful for the next few weeks in lifting any heavy objects greater than 5 - 10 pounds. Your chest muscles may be weaker than before surgery and will take some time to build back up to their normal strength. Until then, lift slowly and with caution. Please follow-up with your Cardiologist and Primary Care Physician as scheduled. Although you have no regularly scheduled appointments to see us in the future, please feel free to call our office with any questions or concerns.

## 2023-03-03 NOTE — PROGRESS NOTES
CARDIAC SURGERY FOLLOW-UP NOTE    3/3/2023    Subjective:   Hardik Jimenez returns for a follow-up visit following CABG x 3 on 2/3/23 by Dr. Angus Bertrand. Assessment:  Overall she is doing  following open-heart surgery. Plans / Recommendations:   Start Low dose beta blocker and check BP at home. Follow-up with Primary Care Provider and Cardiologist in the future as directed. Cleared of sternal precautions and to drive next week. Has no regularly scheduled appointment to see us, but may call with any questions or concerns. Recommend entrance into cardiac rehabilitation program.  Referral made. Adhere to low-cholesterol, low-fat diet. Begin regular exercise, preferably 30 minutes daily. Patient verbalizes understanding and agreement with the plan. Dimitris Gardiner PA-C  [facility]  [phone]  _______________________________________________________________________________________________________________________________________________________  Subjective: The patient feels well. Angina is absent. Shortness of breath is absent. Sternal pain is absent. Stamina is improving, able to perform daily many activities without restriction. Eating well. Bowel movements regular.     Smoking status: never smoked    Current medications include:  Current Outpatient Medications   Medication Sig Dispense Refill    rosuvastatin (CRESTOR) 10 MG tablet Take 10 mg by mouth daily      metFORMIN (GLUCOPHAGE) 500 MG tablet Take by mouth 2 times daily (with meals)      metoprolol succinate (TOPROL XL) 25 MG extended release tablet Take 25 mg by mouth daily      nitroGLYCERIN (NITROSTAT) 0.4 MG SL tablet Place 0.4 mg under the tongue      clopidogrel (PLAVIX) 75 MG tablet Take 1 tablet by mouth daily 30 tablet 3    aspirin 81 MG EC tablet Take 1 tablet by mouth daily 30 tablet 3    levothyroxine (SYNTHROID) 100 MCG tablet Take 1 tablet by mouth every morning (before breakfast) 30 tablet 3    metFORMIN (GLUCOPHAGE) 1000 MG tablet Take 1 tablet by mouth 2 times daily (with meals) 60 tablet 2    LISINOPRIL PO Take 10 mg by mouth daily (Patient not taking: No sig reported)       No current facility-administered medications for this visit. Objective:   Vital signs:    BP Readings from Last 3 Encounters:   03/03/23 132/82   02/21/23 98/72   02/13/23 130/78     Wt Readings from Last 3 Encounters:   03/03/23 161 lb (73 kg)   02/21/23 160 lb (72.6 kg)   02/13/23 163 lb 8 oz (74.2 kg)     @TMAX(24)@  Wt Readings from Last 3 Encounters:   03/03/23 161 lb (73 kg)   02/21/23 160 lb (72.6 kg)   02/13/23 163 lb 8 oz (74.2 kg)     Ht Readings from Last 3 Encounters:   03/03/23 5' 6\" (1.676 m)   02/21/23 5' 6\" (1.676 m)   02/10/23 5' 6\" (1.676 m)     Respiratory exam: chest clear, no wheezing, rales, normal symmetric air entry. Cardiac exam: regular rate and rhythm   Sternum: stable. Sternal wound: clean, dry, healing. Leg wounds: clean, dry, healing. Pedal edema: absent.

## 2023-03-07 RX ORDER — ASPIRIN 81 MG/1
TABLET ORAL
COMMUNITY
End: 2023-03-07 | Stop reason: SDUPTHER

## 2023-03-07 RX ORDER — FAMOTIDINE 20 MG/1
20 TABLET, FILM COATED ORAL DAILY
COMMUNITY
Start: 2023-02-15

## 2023-03-07 RX ORDER — EPINEPHRINE 0.3 MG/.3ML
INJECTION SUBCUTANEOUS
COMMUNITY

## 2023-03-07 RX ORDER — LIDOCAINE 50 MG/G
PATCH TOPICAL
COMMUNITY
End: 2023-03-09 | Stop reason: ALTCHOICE

## 2023-03-07 RX ORDER — TRIAMCINOLONE ACETONIDE 0.25 MG/G
CREAM TOPICAL
COMMUNITY
Start: 2020-02-19 | End: 2023-03-09 | Stop reason: ALTCHOICE

## 2023-03-07 RX ORDER — NITROGLYCERIN 80 MG/1
PATCH TRANSDERMAL
COMMUNITY
End: 2023-03-09 | Stop reason: ALTCHOICE

## 2023-03-07 RX ORDER — LEVOTHYROXINE SODIUM 0.1 MG/1
100 TABLET ORAL DAILY
COMMUNITY
End: 2023-03-09 | Stop reason: ALTCHOICE

## 2023-03-07 RX ORDER — LISINOPRIL 10 MG/1
TABLET ORAL
COMMUNITY
End: 2023-03-09 | Stop reason: ALTCHOICE

## 2023-03-07 RX ORDER — FLUTICASONE PROPIONATE 50 MCG
SPRAY, SUSPENSION (ML) NASAL
COMMUNITY
Start: 2020-11-19 | End: 2023-03-09 | Stop reason: ALTCHOICE

## 2023-03-09 ENCOUNTER — OFFICE VISIT (OUTPATIENT)
Age: 68
End: 2023-03-09
Payer: MEDICARE

## 2023-03-09 VITALS
BODY MASS INDEX: 26.66 KG/M2 | SYSTOLIC BLOOD PRESSURE: 108 MMHG | DIASTOLIC BLOOD PRESSURE: 70 MMHG | OXYGEN SATURATION: 98 % | WEIGHT: 165.2 LBS | HEART RATE: 85 BPM

## 2023-03-09 DIAGNOSIS — I25.83 CORONARY ARTERY DISEASE DUE TO LIPID RICH PLAQUE: ICD-10-CM

## 2023-03-09 DIAGNOSIS — I10 ESSENTIAL HYPERTENSION WITH GOAL BLOOD PRESSURE LESS THAN 140/90: ICD-10-CM

## 2023-03-09 DIAGNOSIS — E78.00 PURE HYPERCHOLESTEROLEMIA: Primary | ICD-10-CM

## 2023-03-09 DIAGNOSIS — I25.10 CORONARY ARTERY DISEASE DUE TO LIPID RICH PLAQUE: ICD-10-CM

## 2023-03-09 PROCEDURE — 99214 OFFICE O/P EST MOD 30 MIN: CPT | Performed by: INTERNAL MEDICINE

## 2023-03-09 PROCEDURE — 1036F TOBACCO NON-USER: CPT | Performed by: INTERNAL MEDICINE

## 2023-03-09 PROCEDURE — G8417 CALC BMI ABV UP PARAM F/U: HCPCS | Performed by: INTERNAL MEDICINE

## 2023-03-09 PROCEDURE — 3074F SYST BP LT 130 MM HG: CPT | Performed by: INTERNAL MEDICINE

## 2023-03-09 PROCEDURE — G8484 FLU IMMUNIZE NO ADMIN: HCPCS | Performed by: INTERNAL MEDICINE

## 2023-03-09 PROCEDURE — G8428 CUR MEDS NOT DOCUMENT: HCPCS | Performed by: INTERNAL MEDICINE

## 2023-03-09 PROCEDURE — 3017F COLORECTAL CA SCREEN DOC REV: CPT | Performed by: INTERNAL MEDICINE

## 2023-03-09 PROCEDURE — G8400 PT W/DXA NO RESULTS DOC: HCPCS | Performed by: INTERNAL MEDICINE

## 2023-03-09 PROCEDURE — 3078F DIAST BP <80 MM HG: CPT | Performed by: INTERNAL MEDICINE

## 2023-03-09 PROCEDURE — 1090F PRES/ABSN URINE INCON ASSESS: CPT | Performed by: INTERNAL MEDICINE

## 2023-03-09 PROCEDURE — 1123F ACP DISCUSS/DSCN MKR DOCD: CPT | Performed by: INTERNAL MEDICINE

## 2023-03-09 PROCEDURE — 1111F DSCHRG MED/CURRENT MED MERGE: CPT | Performed by: INTERNAL MEDICINE

## 2023-03-09 RX ORDER — ACETAMINOPHEN 500 MG
1000 TABLET ORAL EVERY 6 HOURS PRN
COMMUNITY

## 2023-03-09 ASSESSMENT — PATIENT HEALTH QUESTIONNAIRE - PHQ9
2. FEELING DOWN, DEPRESSED OR HOPELESS: 0
SUM OF ALL RESPONSES TO PHQ QUESTIONS 1-9: 0
1. LITTLE INTEREST OR PLEASURE IN DOING THINGS: 0
SUM OF ALL RESPONSES TO PHQ9 QUESTIONS 1 & 2: 0
SUM OF ALL RESPONSES TO PHQ QUESTIONS 1-9: 0

## 2023-03-09 NOTE — PROGRESS NOTES
Identified pt with two pt identifiers(name and ). Reviewed record in preparation for visit and have obtained necessary documentation.    Brynn Lyon presents today for   Chief Complaint   Patient presents with    Follow-up     S/p [facility]        Pt denies  DIZZINESS, SOB, CHEST PAIN/ PRESSURE, FATIGUE/WEAKNESS, HEADACHES, SWELLING.             Brynn Lyon preferred language for health care discussion is english/other.      Precautions:   Patient currently on None  Patient currently roomed with door closed.    Is someone accompanying this pt? yes    Is the patient using any DME equipment during OV? No     Depression Screening:  completed      Abuse Screening:  Completed     Fall Risk  completed    Pt currently taking Anticoagulant therapy? No   Pt currently taking Antiplatelet therapy? yes    Coordination of Care:  1. Have you been to the ER, urgent care clinic since your last visit? Hospitalized since your last visit? No     2. Have you seen or consulted any other health care providers outside of the [facility] since your last visit? Include any pap smears or colon screening. No

## 2023-03-09 NOTE — PROGRESS NOTES
[facility]    Clark Atwood is 76 y.o. female with a history of CAD, CABG in 2023 diabetes, hypertension, hypothyroidism and fibromyalgia. Ms. Ana Chand is here today for follow-up appointment for CAD, hypertension hyperlipidemia  Patient was having symptoms concerning for unstable angina in 02/2023. LHC was performed in 02/2023 which showed left main 50% stenosis along with RCA disease. Patient underwent CABG x3 with LIMA to LAD, SVG to OM and SVG to PDA at [facility] on 02/6/2023    Patient is doing very well. Denies any cardiac complaint. Denies any chest pain or chest tightness. Awaiting cardiac rehabilitation. No significant edema. Taking her medication as prescribed. Dose of metoprolol has been reduced to once recently by CT surgery team because of lower blood pressure. No specific cardiac complaint at this [place]     Past Medical History:   Diagnosis Date    Diabetes ([facility] [address]     Fibromyalgia     HTN (hypertension)     Hypothyroid     Nausea & vomiting     Obesity     S/P CABG x 3 2/6/2023    CABG x3 (LIMA-LAD, rSVG-OM, rSVG-PDA) - Dr. Siria Foley    Varicose veins of both lower extremities        Review of Systems:  Cardiac symptoms as noted above in HPI. All others negative.     Current Outpatient Medications   Medication Sig    acetaminophen (TYLENOL) 500 MG tablet Take 1,000 mg by mouth every 6 hours as needed for Pain    famotidine (PEPCID) 20 MG tablet Take 20 mg by mouth daily    EPINEPHrine (EPIPEN 2-ERIC) 0.3 MG/0.3ML SOAJ injection as directed    metoprolol tartrate (LOPRESSOR) 25 MG tablet Take 0.5 tablets by mouth 2 times daily    rosuvastatin (CRESTOR) 10 MG tablet Take 10 mg by mouth daily    nitroGLYCERIN (NITROSTAT) 0.4 MG SL tablet Place 0.4 mg under the tongue    clopidogrel (PLAVIX) 75 MG tablet Take 1 tablet by mouth daily    aspirin 81 MG EC tablet Take 1 tablet by mouth daily    metFORMIN (GLUCOPHAGE) 1000 MG tablet Take 1 tablet by mouth 2 times daily (with meals)     No current facility-administered medications for this visit. Past Surgical History:   Procedure Laterality Date    CHOLECYSTECTOMY      COLONOSCOPY      age 48    COLONOSCOPY N/A 10/7/2020    COLONOSCOPY, biopsy, polypectomies performed by Thomas Butler MD at [facility]      colposcopy and portion cervix removed    HEENT      tonsillectomy    HERNIA REPAIR      abdominal       Allergies and Sensitivities:  Allergies   Allergen Reactions    Bee Venom Anaphylaxis    Prochlorperazine Edisylate Angioedema    Nickel Itching       Family History:  No family history on file. Social History:  Social History     Tobacco Use    Smoking status: Never    Smokeless tobacco: Never   Substance Use Topics    Alcohol use: No    Drug use: No     She  reports that she has never smoked. She has never used smokeless tobacco.  She  reports no history of alcohol use. Physical Exam:  BP Readings from Last 3 Encounters:   03/09/23 108/70   03/03/23 132/82   02/21/23 98/72         Pulse Readings from Last 3 Encounters:   03/09/23 85   03/03/23 100   02/21/23 76          Wt Readings from Last 3 Encounters:   03/09/23 165 lb 3.2 oz (74.9 kg)   03/03/23 161 lb (73 kg)   02/21/23 160 lb (72.6 kg)       Constitutional: Oriented to person, place, and time. HENT: Head: Normocephalic and atraumatic. Neck: No JVD present. Cardiovascular: Regular rhythm. No murmur, gallop or rubs appreciated  Lung: Breath sounds normal. No respiratory distress. No ronchi or rales appreciated  Abdominal: No tenderness. No rebound and no guarding. Musculoskeletal: There is trace lower extremity edema.  No cynosis      LABS:   @  Lab Results   Component Value Date/Time    WBC 8.7 02/12/2023 03:45 AM    HGB 8.6 02/12/2023 03:45 AM    HCT 25.7 02/12/2023 03:45 AM     02/12/2023 03:45 AM    MCV 94.1 02/12/2023 03:45 AM     Lab Results   Component Value Date/Time     02/13/2023 05:15 AM    K 4.0 02/13/2023 05:15 AM     02/13/2023 05:15 AM    CO2 25 02/13/2023 05:15 AM    BUN 12 02/13/2023 05:15 AM    CREATININE 0.62 02/13/2023 05:15 AM    GLUCOSE 94 02/13/2023 05:15 AM    CALCIUM 8.4 02/13/2023 05:15 AM       Lipids Latest Ref Rng & Units 2/4/2023 2/1/2023   Chol <200 MG/ -   HDL 40 - 60 MG/DL 40 -   LDL Calc 0 - 100 MG/. 6(H) -   VLDL Calc MG/DL 36.4 -   Trig <150 MG/(H) -   Ratio 0 - 5.0   4.6 -   ALT 13 - 56 U/L 41 42   AST 10 - 38 U/L 37 37   Some recent data might be hidden     Lab Results   Component Value Date/Time    ALT 41 02/04/2023 05:45 AM     Hemoglobin A1C   Date Value Ref Range Status   02/04/2023 7.4 (H) 4.2 - 5.6 % Final     Comment:     (NOTE)  HbA1C Interpretive Ranges  <5.7              Normal  5.7 - 6.4         Consider Prediabetes  >6.5              Consider Diabetes       Lab Results   Component Value Date    TSH 1.40 02/04/2023     TRANSTHORACIC ECHOCARDIOGRAM (TTE) COMPLETE (CONTRAST/BUBBLE/3D PRN) 02/03/2023  3:36 PM, 02/03/2023 12:00 AM (Final)    Narrative  This is a summary report. The complete report is available in the patient's medical record. If you cannot access the medical record, please contact the sending organization for a detailed fax or copy. Contrast used: Definity to enhance endocardial border definition. Left Ventricle: Low normal left ventricular systolic function with a visually estimated EF of 50 - 55%. Left ventricle size is normal. LVIDd is 4.2 cm. Mild septal thickening. Normal wall motion. Normal diastolic function. Right Ventricle: Right ventricle is mildly dilated. Low normal systolic function. TAPSE is 1.7 cm. RV Peak S' is 10 cm/s. Aortic Valve: Trace to mild regurgitation. No stenosis. Mitral Valve: Trace regurgitation. No stenosis noted. Tricuspid Valve: Mild regurgitation. Normal RVSP. The estimated RVSP is 22 mmHg. Aorta: Normal sized aortic root and aortic arch.  Ao Root diameter is 2.9 cm. Dilated ascending aorta. Ao Ascending diameter is 3.9 cm. Descending aorta is not well-visualized. CARDIAC CATH 02/03/2023  1:58 PM (Final)  LM: Ostial calcified angiographic 50% plaque  LAD: Calcified, mid LAD at diagonal bifurcation approximately 50% moderate disease. Ostial diagonal 40-50% disease otherwise normal  LCx: 30-40% stenosis otherwise normal  OM1: Large-caliber vessel without any obstructive disease  RCA: Proximal and mid has serial lesion up to 80-90%. Distal vessel without any disease  LVEF approximately 55%  LVEDP 4 mmHg    IMPRESSION & PLAN:  Junior Page  is 76 y.o. female with    CAD:  Unstable angina requiring LHC in 02/2023  LHC 02/3/2023: Ostial 50% left main plaque as well as significant RCA disease  02/2023: CABG x3 with LIMA to LAD, SVG to RPDA and SVG to OM  Currently on aspirin and Plavix. Low-dose of beta-blocker. Continue same  Continue statin  Recommend cardiac rehabilitation if it is okay with CT surgery team  We will continue Plavix for 1 year and then stop after CABG     Hypertension:  /70. Currently on metoprolol once a day dosing. She will keep checking blood pressure at home regularly      Diabetes:  Goal Hgb A1c less than 7 is recommended from a cardiovascular standpoint. Defer to PCP. Obesity: Max weight 220 lbs. Now down to 166 lbs with diet and exercise. Continue same. This plan was discussed with patient who is in agreement. Thank you for allowing me to participate in patient care. Please feel free to call me if you have any question or concern. Elisa Platt MD  Please note: This document has been produced using voice recognition software. Unrecognized errors in transcription may be present.

## 2023-03-09 NOTE — PATIENT INSTRUCTIONS
Cardiac Rehab- kaiden will contact you ( [phone])  New Location Address- projected for the month of May 2023    [address], [place] [address]

## 2023-03-28 ENCOUNTER — TELEPHONE (OUTPATIENT)
Age: 68
End: 2023-03-28

## 2023-03-28 NOTE — TELEPHONE ENCOUNTER
Patient had bypass surgery on 02/06 and has been waiting on a call to start cardiac rehab. However when she called the rehab center they said they had not received any referral from us. Please discuss with Elio Moncada and have referral sent.  Patient wanting to have rehab done at [facility]

## 2023-03-28 NOTE — TELEPHONE ENCOUNTER
[facility]. Two patient Identifiers confirmed. Informed pt cardiac rehab order was initiated by Dr Gavin Monzon office. Pt verbalized understanding.

## 2023-05-29 RX ORDER — CLOPIDOGREL BISULFATE 75 MG/1
TABLET ORAL
Qty: 30 TABLET | Refills: 0 | OUTPATIENT
Start: 2023-05-29

## 2023-07-02 RX ORDER — CLOPIDOGREL BISULFATE 75 MG/1
TABLET ORAL
Qty: 30 TABLET | Refills: 0 | OUTPATIENT
Start: 2023-07-02

## 2023-07-17 RX ORDER — CLOPIDOGREL BISULFATE 75 MG/1
TABLET ORAL
Qty: 30 TABLET | Refills: 0 | OUTPATIENT
Start: 2023-07-17

## 2023-08-24 ENCOUNTER — OFFICE VISIT (OUTPATIENT)
Age: 68
End: 2023-08-24

## 2023-08-24 VITALS
SYSTOLIC BLOOD PRESSURE: 132 MMHG | OXYGEN SATURATION: 98 % | BODY MASS INDEX: 27.12 KG/M2 | HEART RATE: 86 BPM | DIASTOLIC BLOOD PRESSURE: 69 MMHG | WEIGHT: 168 LBS

## 2023-08-24 DIAGNOSIS — E78.00 PURE HYPERCHOLESTEROLEMIA: ICD-10-CM

## 2023-08-24 DIAGNOSIS — I25.83 CORONARY ARTERY DISEASE DUE TO LIPID RICH PLAQUE: Primary | ICD-10-CM

## 2023-08-24 DIAGNOSIS — I25.10 CORONARY ARTERY DISEASE DUE TO LIPID RICH PLAQUE: Primary | ICD-10-CM

## 2023-08-24 DIAGNOSIS — I10 ESSENTIAL HYPERTENSION WITH GOAL BLOOD PRESSURE LESS THAN 140/90: ICD-10-CM

## 2023-08-24 RX ORDER — GEMFIBROZIL 600 MG/1
600 TABLET, FILM COATED ORAL
COMMUNITY

## 2023-08-24 NOTE — PATIENT INSTRUCTIONS
Courtney- paperwork sent to [facility]. They will contact you for medication administration once authorization is approved.

## 2023-08-24 NOTE — PROGRESS NOTES
Identified pt with two pt identifiers(name and ). Reviewed record in preparation for visit and have obtained necessary documentation. Rakel Michaud presents today for   Chief Complaint   Patient presents with    Follow-up     3m       Pt c/o CHEST PAIN/ PRESSURE. Rakel Michaud preferred language for health care discussion is english/other. Personal Protective Equipment:   Personal Protective Equipment was used including: mask-surgical and hands-gloves. Patient was placed on no precaution(s). Patient was masked. Precautions:   Patient currently on None  Patient currently roomed with door closed. Is someone accompanying this pt? no    Is the patient using any DME equipment during [address]? no    Depression Screening:  PHQ-9 Questionaire 3/9/2023 2023   Little interest or pleasure in doing things 0 0   Feeling down, depressed, or hopeless 0 0   PHQ-9 Total Score 0 0        Learning Assessment:  No question data found. Abuse Screening: AMB Abuse Screening 2023 3/9/2023   Do you ever feel afraid of your partner? N N   Are you in a relationship with someone who physically or mentally threatens you? N N   Is it safe for you to go home? Y Y          Fall Risk  Fall Risk 2023 3/9/2023   2 or more falls in past year? no no   Fall with injury in past year? no no         Pt currently taking Anticoagulant therapy? No   Pt currently taking Antiplatelet therapy? Plavix 75 mg tab and aspirin 81 mg     Coordination of Care:  1. Have you been to the ER, urgent care clinic since your last visit? Hospitalized since your last visit? no    2. Have you seen or consulted any other health care providers outside of the [address] [place] [address] since your last visit? Include any pap smears or colon screening. no      Please see Red banners under Allergies and Med Rec to remove outside inquires. All correct information has been verified with patient and added to chart.      Medication's patient's would liked

## 2023-08-24 NOTE — PROGRESS NOTES
[facility]    Priscilla Ernandez is 76 y.o. female with a history of CAD, CABG in 2023 diabetes, hypertension, hypothyroidism and fibromyalgia. Ms. Sherryle Currier is here today for follow-up appointment for CAD, hypertension hyperlipidemia  Patient was having symptoms concerning for unstable angina in 02/2023. LHC was performed in 02/2023 which showed left main 50% stenosis along with RCA disease. Patient underwent CABG x3 with LIMA to LAD, SVG to OM and [facility]  Because of CP she underwent ECHO and NST at [facility] [place] 06/2023 and per patient it was within normal limits and had increased EF. Patient has fibromyalgia. She has to stop Crestor because of significant worsening of joint pain and myalgia. Denies any chest pain or chest tightness recently or use of nitroglycerin. Patient is doing very well. Denies any cardiac complaint. Denies any chest pain or chest tightness. No significant edema. Taking her medication as prescribed. No specific cardiac complaint at this [place]     Past Medical History:   Diagnosis Date    Diabetes ([address])     Fibromyalgia     HTN (hypertension)     Hypothyroid     Nausea & vomiting     Obesity     S/P CABG x 3 2/6/2023    CABG x3 (LIMA-LAD, rSVG-OM, rSVG-PDA) - Dr. Yulissa De La Torre    Varicose veins of both lower extremities        Review of Systems:  Cardiac symptoms as noted above in HPI. All others negative.     Current Outpatient Medications   Medication Sig    gemfibrozil (LOPID) 600 MG tablet Take 1 tablet by mouth 2 times daily (before meals)    metoprolol tartrate (LOPRESSOR) 25 MG tablet Take 0.5 tablets by mouth 2 times daily    nitroGLYCERIN (NITROSTAT) 0.4 MG SL tablet Place 1 tablet under the tongue    clopidogrel (PLAVIX) 75 MG tablet Take 1 tablet by mouth daily    aspirin 81 MG EC tablet Take 1 tablet by mouth daily    acetaminophen (TYLENOL) 500 MG tablet Take 1,000 mg by

## 2024-02-27 ENCOUNTER — OFFICE VISIT (OUTPATIENT)
Age: 69
End: 2024-02-27
Payer: MEDICARE

## 2024-02-27 VITALS
OXYGEN SATURATION: 97 % | HEART RATE: 74 BPM | DIASTOLIC BLOOD PRESSURE: 97 MMHG | WEIGHT: 160 LBS | SYSTOLIC BLOOD PRESSURE: 145 MMHG | BODY MASS INDEX: 25.82 KG/M2

## 2024-02-27 DIAGNOSIS — I25.10 CORONARY ARTERY DISEASE DUE TO LIPID RICH PLAQUE: Primary | ICD-10-CM

## 2024-02-27 DIAGNOSIS — E78.00 PURE HYPERCHOLESTEROLEMIA: ICD-10-CM

## 2024-02-27 DIAGNOSIS — I10 ESSENTIAL HYPERTENSION WITH GOAL BLOOD PRESSURE LESS THAN 140/90: ICD-10-CM

## 2024-02-27 DIAGNOSIS — I25.83 CORONARY ARTERY DISEASE DUE TO LIPID RICH PLAQUE: Primary | ICD-10-CM

## 2024-02-27 PROCEDURE — 1036F TOBACCO NON-USER: CPT | Performed by: INTERNAL MEDICINE

## 2024-02-27 PROCEDURE — G8417 CALC BMI ABV UP PARAM F/U: HCPCS | Performed by: INTERNAL MEDICINE

## 2024-02-27 PROCEDURE — 3017F COLORECTAL CA SCREEN DOC REV: CPT | Performed by: INTERNAL MEDICINE

## 2024-02-27 PROCEDURE — 1090F PRES/ABSN URINE INCON ASSESS: CPT | Performed by: INTERNAL MEDICINE

## 2024-02-27 PROCEDURE — 3080F DIAST BP >= 90 MM HG: CPT | Performed by: INTERNAL MEDICINE

## 2024-02-27 PROCEDURE — 99214 OFFICE O/P EST MOD 30 MIN: CPT | Performed by: INTERNAL MEDICINE

## 2024-02-27 PROCEDURE — 3077F SYST BP >= 140 MM HG: CPT | Performed by: INTERNAL MEDICINE

## 2024-02-27 PROCEDURE — G8400 PT W/DXA NO RESULTS DOC: HCPCS | Performed by: INTERNAL MEDICINE

## 2024-02-27 PROCEDURE — G8428 CUR MEDS NOT DOCUMENT: HCPCS | Performed by: INTERNAL MEDICINE

## 2024-02-27 PROCEDURE — 1123F ACP DISCUSS/DSCN MKR DOCD: CPT | Performed by: INTERNAL MEDICINE

## 2024-02-27 PROCEDURE — G8484 FLU IMMUNIZE NO ADMIN: HCPCS | Performed by: INTERNAL MEDICINE

## 2024-02-27 NOTE — PROGRESS NOTES
Identified pt with two pt identifiers(name and ). Reviewed record in preparation for visit and have obtained necessary documentation.    Brynn Lyon presents today for   Chief Complaint   Patient presents with    Follow-up       Pt c/o Denied.             Brynn Lyon preferred language for health care discussion is english/other.    Personal Protective Equipment:   Personal Protective Equipment was used including: mask-surgical and hands-gloves. Patient was placed on no precaution(s). Patient was not masked.    Precautions:   Patient currently on None  Patient currently roomed with door closed.    Is someone accompanying this pt?     Is the patient using any DME equipment during OV? no    Depression Screening:      3/9/2023    11:42 AM 2023     3:03 PM   PHQ-9 Questionaire   Little interest or pleasure in doing things 0 0   Feeling down, depressed, or hopeless 0 0   PHQ-9 Total Score 0 0        Learning Assessment:  No question data found.    Abuse Screenin/27/2024    11:00 AM 2023    11:00 AM 3/9/2023    11:00 AM   AMB Abuse Screening   Do you ever feel afraid of your partner? N N N   Are you in a relationship with someone who physically or mentally threatens you? N N N   Is it safe for you to go home? Y Y Y          Fall Risk      2024    11:24 AM 2023    11:19 AM 3/9/2023    11:42 AM   Fall Risk   2 or more falls in past year? no no no   Fall with injury in past year? no no no         Pt currently taking Anticoagulant therapy? no  Pt currently taking Antiplatelet therapy? no    Coordination of Care:  1. Have you been to the ER, urgent care clinic since your last visit? Hospitalized since your last visit? no    2. Have you seen or consulted any other health care providers outside of the [facility] since your last visit? Include any pap smears or colon screening. no      Please see Red banners under Allergies and Med Rec to remove outside inquires. All correct 
  Component Value Date/Time     02/13/2023 05:15 AM    K 4.0 02/13/2023 05:15 AM     02/13/2023 05:15 AM    CO2 25 02/13/2023 05:15 AM    BUN 12 02/13/2023 05:15 AM    CREATININE 0.62 02/13/2023 05:15 AM    GLUCOSE 94 02/13/2023 05:15 AM    CALCIUM 8.4 02/13/2023 05:15 AM           Latest Ref Rng & Units 2/4/2023     5:45 AM 2/1/2023     1:10 PM   Lipids   Chol <200 MG/     HDL 40 - 60 MG/DL 40     LDL Calc 0 - 100 MG/.6     VLDL Calc MG/DL 36.4     Trig <150 MG/     Ratio 0 - 5.0 4.6     ALT 13 - 56 U/L 41  42    AST 10 - 38 U/L 37  37      Lab Results   Component Value Date/Time    ALT 41 02/04/2023 05:45 AM     Hemoglobin A1C   Date Value Ref Range Status   02/04/2023 7.4 (H) 4.2 - 5.6 % Final     Comment:     (NOTE)  HbA1C Interpretive Ranges  <5.7              Normal  5.7 - 6.4         Consider Prediabetes  >6.5              Consider Diabetes       Lab Results   Component Value Date    TSH 1.40 02/04/2023     TRANSTHORACIC ECHOCARDIOGRAM (TTE) COMPLETE (CONTRAST/BUBBLE/3D PRN) 02/03/2023  3:36 PM, 02/03/2023 12:00 AM (Final)    Contrast used: Definity to enhance endocardial border definition.    Left Ventricle: Low normal left ventricular systolic function with a visually estimated EF of 50 - 55%. Left ventricle size is normal. LVIDd is 4.2 cm. Mild septal thickening. Normal wall motion. Normal diastolic function.    Right Ventricle: Right ventricle is mildly dilated. Low normal systolic function. TAPSE is 1.7 cm. RV Peak S' is 10 cm/s.    Aortic Valve: Trace to mild regurgitation. No stenosis.    Mitral Valve: Trace regurgitation. No stenosis noted.    Tricuspid Valve: Mild regurgitation. Normal RVSP. The estimated RVSP is 22 mmHg.    Aorta: Normal sized aortic root and aortic arch. Ao Root diameter is 2.9 cm. Dilated ascending aorta. Ao Ascending diameter is 3.9 cm. Descending aorta is not well-visualized.     CARDIAC CATH 02/03/2023  1:58 PM (Final)  LM: Ostial calcified

## 2024-02-27 NOTE — PATIENT INSTRUCTIONS
New Medication/Medication Changes  Metoprolol ( lopressor) 25 mg  twice a day  Stop plavix    **please allow 24-48 hrs for medication to be escribed to pharmacy** If you need any refills on medications please contact your pharmacy so that the request can be escribed to the provider for review seven to 10 days prior to being out of medication.

## 2024-03-28 ENCOUNTER — OFFICE VISIT (OUTPATIENT)
Age: 69
End: 2024-03-28
Payer: MEDICARE

## 2024-03-28 VITALS
RESPIRATION RATE: 16 BRPM | BODY MASS INDEX: 25.23 KG/M2 | WEIGHT: 157 LBS | HEIGHT: 66 IN | HEART RATE: 58 BPM | DIASTOLIC BLOOD PRESSURE: 76 MMHG | OXYGEN SATURATION: 98 % | SYSTOLIC BLOOD PRESSURE: 151 MMHG

## 2024-03-28 DIAGNOSIS — I25.10 CORONARY ARTERY DISEASE INVOLVING NATIVE CORONARY ARTERY OF NATIVE HEART WITHOUT ANGINA PECTORIS: Primary | ICD-10-CM

## 2024-03-28 DIAGNOSIS — I25.83 CORONARY ARTERY DISEASE DUE TO LIPID RICH PLAQUE: ICD-10-CM

## 2024-03-28 DIAGNOSIS — E78.2 MIXED HYPERLIPIDEMIA: ICD-10-CM

## 2024-03-28 DIAGNOSIS — E03.9 HYPOTHYROIDISM, UNSPECIFIED TYPE: ICD-10-CM

## 2024-03-28 DIAGNOSIS — I10 ESSENTIAL HYPERTENSION: ICD-10-CM

## 2024-03-28 DIAGNOSIS — I25.10 CORONARY ARTERY DISEASE DUE TO LIPID RICH PLAQUE: ICD-10-CM

## 2024-03-28 PROBLEM — K21.9 CHRONIC GERD: Status: ACTIVE | Noted: 2024-03-28

## 2024-03-28 PROBLEM — N87.0 DYSPLASIA OF CERVIX, LOW GRADE (CIN 1): Status: ACTIVE | Noted: 2017-11-21

## 2024-03-28 PROBLEM — E55.9 VITAMIN D DEFICIENCY: Status: ACTIVE | Noted: 2024-03-28

## 2024-03-28 PROCEDURE — G8417 CALC BMI ABV UP PARAM F/U: HCPCS | Performed by: PHYSICIAN ASSISTANT

## 2024-03-28 PROCEDURE — 3017F COLORECTAL CA SCREEN DOC REV: CPT | Performed by: PHYSICIAN ASSISTANT

## 2024-03-28 PROCEDURE — 3077F SYST BP >= 140 MM HG: CPT | Performed by: PHYSICIAN ASSISTANT

## 2024-03-28 PROCEDURE — 1090F PRES/ABSN URINE INCON ASSESS: CPT | Performed by: PHYSICIAN ASSISTANT

## 2024-03-28 PROCEDURE — G8484 FLU IMMUNIZE NO ADMIN: HCPCS | Performed by: PHYSICIAN ASSISTANT

## 2024-03-28 PROCEDURE — 1123F ACP DISCUSS/DSCN MKR DOCD: CPT | Performed by: PHYSICIAN ASSISTANT

## 2024-03-28 PROCEDURE — 3078F DIAST BP <80 MM HG: CPT | Performed by: PHYSICIAN ASSISTANT

## 2024-03-28 PROCEDURE — 1036F TOBACCO NON-USER: CPT | Performed by: PHYSICIAN ASSISTANT

## 2024-03-28 PROCEDURE — G8428 CUR MEDS NOT DOCUMENT: HCPCS | Performed by: PHYSICIAN ASSISTANT

## 2024-03-28 PROCEDURE — 93000 ELECTROCARDIOGRAM COMPLETE: CPT | Performed by: PHYSICIAN ASSISTANT

## 2024-03-28 PROCEDURE — 99214 OFFICE O/P EST MOD 30 MIN: CPT | Performed by: PHYSICIAN ASSISTANT

## 2024-03-28 PROCEDURE — G8400 PT W/DXA NO RESULTS DOC: HCPCS | Performed by: PHYSICIAN ASSISTANT

## 2024-03-28 RX ORDER — AMLODIPINE BESYLATE 2.5 MG/1
2.5 TABLET ORAL DAILY
Qty: 30 TABLET | Refills: 3 | Status: SHIPPED | OUTPATIENT
Start: 2024-03-28

## 2024-03-28 NOTE — PROGRESS NOTES
[facility]    Brynn Lyon is a 69 y.o. female, pmhx of  CAD s/p CABG 2023, DM, HTN, hypothyroidism and HLD     Patient had a LHC in 02/2023 for symptoms concerning unstable angina.  Her left heart cath 02/2023 showed left main 50% stenosis along with RCA disease. Patient underwent CABG x3 with LIMA to LAD, SVG to OM and SVG to PDA at [facility] on 02/6/2023. Because of CP she underwent ECHO and NST at [facility] 06/2023 and per patient it was within normal limits and had increased EF.      Presents to the office today for blood pressure follow-up. Pressure elevated today despite taking her medications this morning.  Otherwise she has been doing well from a cardiovascular standpoint.  She is currently awaiting an appointment with GI for her hiatal hernia. Denies CP, SOB, diaphoresis, N/V/D, weight gain, LE edema, orthopnea, PND, palpitations, near syncope or syncope, dizziness.     Past Medical History:   Diagnosis Date    Diabetes (HCC)     Fibromyalgia     HTN (hypertension)     Hypothyroid     Nausea & vomiting     Obesity     S/P CABG x 3 2/6/2023    CABG x3 (LIMA-LAD, rSVG-OM, rSVG-PDA) - Dr. Robin    Varicose veins of both lower extremities      Past Surgical History:   Procedure Laterality Date    CHOLECYSTECTOMY      COLONOSCOPY      age 50    COLONOSCOPY N/A 10/7/2020    COLONOSCOPY, biopsy, polypectomies performed by Richie Paige MD at [facility] ENDOSCOPY    GYN      colposcopy and portion cervix removed    HEENT      tonsillectomy    HERNIA REPAIR      abdominal       Current Outpatient Medications   Medication Sig    gemfibrozil (LOPID) 600 MG tablet Take 1 tablet by mouth 2 times daily (before meals)    acetaminophen (TYLENOL) 500 MG tablet Take 1,000 mg by mouth every 6 hours as needed for Pain    EPINEPHrine (EPIPEN 2-ERIC) 0.3 MG/0.3ML SOAJ injection as directed    metoprolol tartrate (LOPRESSOR) 25 MG tablet Take 0.5 tablets by mouth 2 times daily (Patient taking differently: Take

## 2024-04-26 DIAGNOSIS — E78.2 MIXED HYPERLIPIDEMIA: Primary | ICD-10-CM

## 2024-05-02 ENCOUNTER — HOSPITAL ENCOUNTER (OUTPATIENT)
Facility: HOSPITAL | Age: 69
Setting detail: SPECIMEN
Discharge: HOME | End: 2024-05-02
Payer: MEDICARE

## 2024-05-02 DIAGNOSIS — E78.2 MIXED HYPERLIPIDEMIA: ICD-10-CM

## 2024-05-02 LAB
CHOLEST SERPL-MCNC: 167 MG/DL
HDLC SERPL-MCNC: 49 MG/DL (ref 40–60)
HDLC SERPL: 3.4 (ref 0–5)
LDLC SERPL CALC-MCNC: 82.2 MG/DL (ref 0–100)
LIPID PANEL: ABNORMAL
TRIGL SERPL-MCNC: 179 MG/DL
VLDLC SERPL CALC-MCNC: 35.8 MG/DL

## 2024-05-02 PROCEDURE — 80061 LIPID PANEL: CPT

## 2024-05-02 PROCEDURE — 36415 COLL VENOUS BLD VENIPUNCTURE: CPT

## 2024-05-16 ENCOUNTER — TELEPHONE (OUTPATIENT)
Dept: CARDIOTHORACIC SURGERY | Age: 69
End: 2024-05-16

## 2024-05-16 DIAGNOSIS — Z95.1 S/P CABG X 3: Primary | ICD-10-CM

## 2024-05-16 NOTE — TELEPHONE ENCOUNTER
Sussy Holm, Sandra Calle30 minutes ago (2:07 PM)     TS  Please send for CXR and schedule for Office appt tomorrow.

## 2024-05-16 NOTE — TELEPHONE ENCOUNTER
Pt called stating that she's experiencing a bulging sensation, which it's located lower part of the scar of the sternum. PCP wanted her to be seen by our practice, pt had an appt at [facility] which she showed them and then was sent to pcp. Dr. Rodríguez ([facility]).

## 2024-05-17 ENCOUNTER — OFFICE VISIT (OUTPATIENT)
Dept: CARDIOTHORACIC SURGERY | Age: 69
End: 2024-05-17
Payer: MEDICARE

## 2024-05-17 ENCOUNTER — HOSPITAL ENCOUNTER (OUTPATIENT)
Facility: HOSPITAL | Age: 69
End: 2024-05-17
Payer: MEDICARE

## 2024-05-17 VITALS
HEART RATE: 63 BPM | DIASTOLIC BLOOD PRESSURE: 71 MMHG | BODY MASS INDEX: 25.39 KG/M2 | WEIGHT: 158 LBS | HEIGHT: 66 IN | SYSTOLIC BLOOD PRESSURE: 147 MMHG | OXYGEN SATURATION: 98 %

## 2024-05-17 DIAGNOSIS — K21.9 HIATAL HERNIA WITH GASTROESOPHAGEAL REFLUX: Primary | ICD-10-CM

## 2024-05-17 DIAGNOSIS — Z95.1 S/P CABG X 3: ICD-10-CM

## 2024-05-17 DIAGNOSIS — K44.9 HIATAL HERNIA WITH GASTROESOPHAGEAL REFLUX: Primary | ICD-10-CM

## 2024-05-17 PROCEDURE — 3017F COLORECTAL CA SCREEN DOC REV: CPT | Performed by: PHYSICIAN ASSISTANT

## 2024-05-17 PROCEDURE — G8428 CUR MEDS NOT DOCUMENT: HCPCS | Performed by: PHYSICIAN ASSISTANT

## 2024-05-17 PROCEDURE — 71046 X-RAY EXAM CHEST 2 VIEWS: CPT

## 2024-05-17 PROCEDURE — G8400 PT W/DXA NO RESULTS DOC: HCPCS | Performed by: PHYSICIAN ASSISTANT

## 2024-05-17 PROCEDURE — 99213 OFFICE O/P EST LOW 20 MIN: CPT | Performed by: PHYSICIAN ASSISTANT

## 2024-05-17 PROCEDURE — 1090F PRES/ABSN URINE INCON ASSESS: CPT | Performed by: PHYSICIAN ASSISTANT

## 2024-05-17 PROCEDURE — G8417 CALC BMI ABV UP PARAM F/U: HCPCS | Performed by: PHYSICIAN ASSISTANT

## 2024-05-17 PROCEDURE — 1123F ACP DISCUSS/DSCN MKR DOCD: CPT | Performed by: PHYSICIAN ASSISTANT

## 2024-05-17 PROCEDURE — 3078F DIAST BP <80 MM HG: CPT | Performed by: PHYSICIAN ASSISTANT

## 2024-05-17 PROCEDURE — 1036F TOBACCO NON-USER: CPT | Performed by: PHYSICIAN ASSISTANT

## 2024-05-17 PROCEDURE — 3077F SYST BP >= 140 MM HG: CPT | Performed by: PHYSICIAN ASSISTANT

## 2024-05-17 RX ORDER — INCLISIRAN 284 MG/1.5ML
284 INJECTION, SOLUTION SUBCUTANEOUS ONCE
COMMUNITY

## 2024-05-17 RX ORDER — LEVOTHYROXINE SODIUM 112 UG/1
112 TABLET ORAL DAILY
COMMUNITY
Start: 2024-03-27

## 2024-05-17 NOTE — PROGRESS NOTES
CARDIAC SURGERY FOLLOW-UP NOTE    5/17/2024    Subjective:   Brynn Lyon returns for a follow-up visit following CABG 1 yr ago. She c/o lump at distal end of sternal incision     Assessment:  She noticed the lump 6 months ago and it has gotten larger. GERD sxs have started since then and become progressively worse. She saw GI who said she needed to see a surgeon. As she though it might be related to the sternal incision, she requested an appt. Of note, she was dx with hiatal hernia 30 yrs ago.     Plans / Recommendations:   Order CT scan  She needs to make an appt with general surgery    Patient verbalizes understanding and agreement with the plan.    _______________________________________________________________________________________________________________________________________________________  Subjective:  The patient feels well.  Angina is absent.  Shortness of breath is absent.    Sternal pain is absent. CXR revealing well healed sternum and intact sternal wires.    Current medications include:  Current Outpatient Medications   Medication Sig Dispense Refill    Inclisiran Sodium (LEQVIO) 284 MG/1.5ML Inject 1.5 mLs into the skin once      vitamin D 25 MCG (1000 UT) CAPS Take by mouth once a week      levothyroxine (SYNTHROID) 112 MCG tablet Take 1 tablet by mouth Daily      metoprolol tartrate (LOPRESSOR) 25 MG tablet Take 1 tablet by mouth 2 times daily 60 tablet 2    acetaminophen (TYLENOL) 500 MG tablet Take 2 tablets by mouth every 6 hours as needed for Pain      EPINEPHrine (EPIPEN 2-ERIC) 0.3 MG/0.3ML SOAJ injection as directed      nitroGLYCERIN (NITROSTAT) 0.4 MG SL tablet Place 1 tablet under the tongue      aspirin 81 MG EC tablet Take 1 tablet by mouth daily 30 tablet 3    metFORMIN (GLUCOPHAGE) 1000 MG tablet Take 1 tablet by mouth 2 times daily (with meals) 60 tablet 2    amLODIPine (NORVASC) 2.5 MG tablet Take 1 tablet by mouth daily (Patient not taking: Reported on 5/17/2024) 30 tablet 3

## 2024-05-22 ENCOUNTER — HOSPITAL ENCOUNTER (OUTPATIENT)
Facility: HOSPITAL | Age: 69
Discharge: HOME | End: 2024-05-25
Payer: MEDICARE

## 2024-05-22 DIAGNOSIS — K44.9 HIATAL HERNIA WITH GASTROESOPHAGEAL REFLUX: ICD-10-CM

## 2024-05-22 DIAGNOSIS — K21.9 HIATAL HERNIA WITH GASTROESOPHAGEAL REFLUX: ICD-10-CM

## 2024-05-22 PROCEDURE — 74176 CT ABD & PELVIS W/O CONTRAST: CPT

## 2024-05-24 ENCOUNTER — TELEPHONE (OUTPATIENT)
Dept: CARDIOTHORACIC SURGERY | Age: 69
End: 2024-05-24

## 2024-05-24 NOTE — TELEPHONE ENCOUNTER
F/u for CT scan which I ordered at last office visit. C/o large protrusion at distal end of sternum. CT scan to r/o sternal involvement, likely hiatal hernia. CT scan confirms and results relayed to pt. She was recommended to call GI and get referral for general surgery. She can call back if she needs results forwarded. She asked to make sure her PCP gets the results. Margi Farrell P: [phone]        FINDINGS:   LOWER CHEST: Extensive coronary calcification.  LIVER: Unremarkable.  BILIARY/GALLBLADDER: Cholecystectomy.  SPLEEN: Unremarkable.  PANCREAS: Unremarkable.  ADRENAL GLANDS: Unremarkable.  KIDNEYS: Left kidney posterior interpolar region 1.5 cm water attenuating simple  cyst.  BLADDER: Empty, limiting evaluation.  REPRODUCTIVE: Unremarkable.  BOWEL: Extensive diverticulosis, predominantly in the sigmoid colon.  Small to moderate size sliding hiatal hernia, similar to prior.  Periampullary diverticulum 4.5 x 3.3 x 4.0 cm.  Unremarkable appendix.  GREAT VESSELS/RETROPERITONEUM: Moderate aortoiliac atherosclerotic  calcification.  LYMPH NODES: Unremarkable.   PERITONEAL SPACES: No free fluid or free air.  BODY WALL/ MSK: Umbilical hernia repair changes.     IMPRESSION:  1.  Small to moderate size sliding hiatal hernia.  2.  Periampullary diverticulum 4.5 cm.  3.  Extensive sigmoid diverticulosis.  4.  Extensive coronary calcification.

## 2024-07-11 ENCOUNTER — OFFICE VISIT (OUTPATIENT)
Age: 69
End: 2024-07-11
Payer: MEDICARE

## 2024-07-11 VITALS
HEART RATE: 55 BPM | SYSTOLIC BLOOD PRESSURE: 128 MMHG | DIASTOLIC BLOOD PRESSURE: 76 MMHG | OXYGEN SATURATION: 97 % | WEIGHT: 154 LBS | BODY MASS INDEX: 24.75 KG/M2 | HEIGHT: 66 IN

## 2024-07-11 DIAGNOSIS — I25.10 CORONARY ARTERY DISEASE DUE TO LIPID RICH PLAQUE: Primary | ICD-10-CM

## 2024-07-11 DIAGNOSIS — I10 ESSENTIAL HYPERTENSION WITH GOAL BLOOD PRESSURE LESS THAN 140/90: ICD-10-CM

## 2024-07-11 DIAGNOSIS — E78.00 PURE HYPERCHOLESTEROLEMIA: ICD-10-CM

## 2024-07-11 DIAGNOSIS — I25.83 CORONARY ARTERY DISEASE DUE TO LIPID RICH PLAQUE: Primary | ICD-10-CM

## 2024-07-11 PROCEDURE — 3074F SYST BP LT 130 MM HG: CPT | Performed by: INTERNAL MEDICINE

## 2024-07-11 PROCEDURE — 3078F DIAST BP <80 MM HG: CPT | Performed by: INTERNAL MEDICINE

## 2024-07-11 PROCEDURE — G8427 DOCREV CUR MEDS BY ELIG CLIN: HCPCS | Performed by: INTERNAL MEDICINE

## 2024-07-11 PROCEDURE — G8420 CALC BMI NORM PARAMETERS: HCPCS | Performed by: INTERNAL MEDICINE

## 2024-07-11 PROCEDURE — G8400 PT W/DXA NO RESULTS DOC: HCPCS | Performed by: INTERNAL MEDICINE

## 2024-07-11 PROCEDURE — 99214 OFFICE O/P EST MOD 30 MIN: CPT | Performed by: INTERNAL MEDICINE

## 2024-07-11 PROCEDURE — 1123F ACP DISCUSS/DSCN MKR DOCD: CPT | Performed by: INTERNAL MEDICINE

## 2024-07-11 PROCEDURE — 1036F TOBACCO NON-USER: CPT | Performed by: INTERNAL MEDICINE

## 2024-07-11 PROCEDURE — 3017F COLORECTAL CA SCREEN DOC REV: CPT | Performed by: INTERNAL MEDICINE

## 2024-07-11 PROCEDURE — 1090F PRES/ABSN URINE INCON ASSESS: CPT | Performed by: INTERNAL MEDICINE

## 2024-07-11 RX ORDER — LORATADINE 10 MG/1
TABLET ORAL
COMMUNITY

## 2024-07-11 RX ORDER — METOPROLOL SUCCINATE 25 MG/1
25 TABLET, EXTENDED RELEASE ORAL 2 TIMES DAILY
COMMUNITY
Start: 2024-05-02

## 2024-07-11 ASSESSMENT — PATIENT HEALTH QUESTIONNAIRE - PHQ9
2. FEELING DOWN, DEPRESSED OR HOPELESS: NOT AT ALL
SUM OF ALL RESPONSES TO PHQ QUESTIONS 1-9: 0
SUM OF ALL RESPONSES TO PHQ9 QUESTIONS 1 & 2: 0
SUM OF ALL RESPONSES TO PHQ QUESTIONS 1-9: 0
1. LITTLE INTEREST OR PLEASURE IN DOING THINGS: NOT AT ALL
SUM OF ALL RESPONSES TO PHQ QUESTIONS 1-9: 0
SUM OF ALL RESPONSES TO PHQ QUESTIONS 1-9: 0

## 2024-07-11 NOTE — PROGRESS NOTES
outside inquires. All correct information has been verified with patient and added to chart.     Medication's patient's would liked removed has been marked not taking to be removed per Verbal order and read back per Moe Frias MD   
Descending aorta is not well-visualized.      STRESS TEST (EST, PHARM, NUC, ECHO etc)  · Baseline ECG: Normal sinus rhythm.  · Low risk Duke treadmill score.  · Negative stress electrocardiogram.  · Gated SPECT: Left ventricular function post-stress was normal. Calculated ejection fraction is 66%.  · Left ventricular perfusion is probably normal.  · Myocardial perfusion imaging defect 1: There is a defect that is small in size present in the apical location(s) that is more pronounced with rest than stress imaging. The defect appears to be an artifact caused by breast attenuation.  · There was no convincing evidence of significant reversible defect to suggest on going major ischemia.  · Myocardial perfusion imaging supports a low risk stress test.     CARDIAC CATH 02/03/2023  1:58 PM (Final)  LM: Ostial calcified angiographic 50% plaque  LAD: Calcified, mid LAD at diagonal bifurcation approximately 50% moderate disease.  Ostial diagonal 40-50% disease otherwise normal  LCx: 30-40% stenosis otherwise normal  OM1: Large-caliber vessel without any obstructive disease  RCA: Proximal and mid has serial lesion up to 80-90%.  Distal vessel without any disease  LVEF approximately 55%  LVEDP 4 mmHg    IMPRESSION & PLAN:  Brynn Lyon is a 69 y.o. female with:     CAD:  Unstable angina with subsequent LHC in 02/2023 which showed Ostial 50% left main plaque as well as significant RCA disease  02/2023: CABG x3 with LIMA to LAD, SVG to RPDA and SVG to OM  NST 06/2023 at [facility] care according to patient without any abnormality according to patient  Continue aspirin and metoprolol.  Currently taking Leqvio for hyperlipidemia.  No symptoms concerning for angina, no recent NTG usage.     HLD: Patient with significant joint pain and myalgia with atorvastatin and rosuvastatin.  Unable to tolerate any statin medication.  Currently taking subcutaneous Leqvio.  Recommend lipid profile before next visit     Hypertension:  /76.  Currently

## 2024-11-11 ENCOUNTER — TELEPHONE (OUTPATIENT)
Age: 69
End: 2024-11-11

## 2024-11-11 NOTE — TELEPHONE ENCOUNTER
Incoming from Guerita at [facility].  Two patient Identifiers confirmed. Informed the needed a rx for pts leqvio and updated notes faxed to [phone].  Informed pending until provider is in office on 11/14/24. Guerita verbalized understanding.

## 2024-11-19 DIAGNOSIS — E78.5 HYPERLIPIDEMIA, UNSPECIFIED HYPERLIPIDEMIA TYPE: Primary | ICD-10-CM

## 2024-11-19 RX ORDER — INCLISIRAN 284 MG/1.5ML
284 INJECTION, SOLUTION SUBCUTANEOUS ONCE
Qty: 1.5 ML | Refills: 2 | Status: CANCELLED | OUTPATIENT
Start: 2024-11-19 | End: 2024-11-19

## 2024-11-21 RX ORDER — INCLISIRAN 284 MG/1.5ML
284 INJECTION, SOLUTION SUBCUTANEOUS ONCE
Qty: 1.5 ML | Refills: 1 | OUTPATIENT
Start: 2024-11-21 | End: 2024-11-21

## 2024-11-29 DIAGNOSIS — E78.5 HYPERLIPIDEMIA, UNSPECIFIED HYPERLIPIDEMIA TYPE: Primary | ICD-10-CM

## 2024-11-29 NOTE — TELEPHONE ENCOUNTER
PCP: Charity Collins, APRN - NP    Last appt:  7/11/2024   Future Appointments   Date Time Provider Department Center   1/16/2025  8:30 AM Mechelle Wade PA-C [facility]       Requested Prescriptions     Pending Prescriptions Disp Refills    Inclisiran Sodium (LEQVIO) 284 MG/1.5ML 1.5 mL 1     Sig: Inject 1.5 mLs into the skin once for 1 dose       Request for a 30 or 90 day supply? Provider Discretion    Pharmacy: confirmed print      Other Comments:offsite INJ twelvestone

## 2025-01-16 ENCOUNTER — HOSPITAL ENCOUNTER (OUTPATIENT)
Facility: HOSPITAL | Age: 70
Setting detail: SPECIMEN
Discharge: HOME | End: 2025-01-19
Payer: MEDICARE

## 2025-01-16 ENCOUNTER — OFFICE VISIT (OUTPATIENT)
Age: 70
End: 2025-01-16
Payer: MEDICARE

## 2025-01-16 VITALS
SYSTOLIC BLOOD PRESSURE: 169 MMHG | HEIGHT: 66 IN | DIASTOLIC BLOOD PRESSURE: 83 MMHG | WEIGHT: 156 LBS | BODY MASS INDEX: 25.07 KG/M2 | HEART RATE: 57 BPM | OXYGEN SATURATION: 99 %

## 2025-01-16 DIAGNOSIS — I10 ESSENTIAL HYPERTENSION: Primary | ICD-10-CM

## 2025-01-16 DIAGNOSIS — E78.2 MIXED HYPERLIPIDEMIA: ICD-10-CM

## 2025-01-16 DIAGNOSIS — I25.83 CORONARY ARTERY DISEASE DUE TO LIPID RICH PLAQUE: ICD-10-CM

## 2025-01-16 DIAGNOSIS — I25.10 CORONARY ARTERY DISEASE DUE TO LIPID RICH PLAQUE: ICD-10-CM

## 2025-01-16 DIAGNOSIS — R01.1 HEART MURMUR: ICD-10-CM

## 2025-01-16 DIAGNOSIS — I10 ESSENTIAL HYPERTENSION: ICD-10-CM

## 2025-01-16 LAB
ANION GAP SERPL CALC-SCNC: 4 MMOL/L (ref 3–18)
BUN SERPL-MCNC: 9 MG/DL (ref 7–18)
BUN/CREAT SERPL: 12 (ref 12–20)
CALCIUM SERPL-MCNC: 8.7 MG/DL (ref 8.5–10.1)
CHLORIDE SERPL-SCNC: 106 MMOL/L (ref 100–111)
CO2 SERPL-SCNC: 28 MMOL/L (ref 21–32)
CREAT SERPL-MCNC: 0.76 MG/DL (ref 0.6–1.3)
GLUCOSE SERPL-MCNC: 178 MG/DL (ref 74–99)
POTASSIUM SERPL-SCNC: 4.2 MMOL/L (ref 3.5–5.5)
SODIUM SERPL-SCNC: 138 MMOL/L (ref 136–145)

## 2025-01-16 PROCEDURE — 1090F PRES/ABSN URINE INCON ASSESS: CPT | Performed by: PHYSICIAN ASSISTANT

## 2025-01-16 PROCEDURE — 80048 BASIC METABOLIC PNL TOTAL CA: CPT

## 2025-01-16 PROCEDURE — G8427 DOCREV CUR MEDS BY ELIG CLIN: HCPCS | Performed by: PHYSICIAN ASSISTANT

## 2025-01-16 PROCEDURE — 1123F ACP DISCUSS/DSCN MKR DOCD: CPT | Performed by: PHYSICIAN ASSISTANT

## 2025-01-16 PROCEDURE — 36415 COLL VENOUS BLD VENIPUNCTURE: CPT

## 2025-01-16 PROCEDURE — 3017F COLORECTAL CA SCREEN DOC REV: CPT | Performed by: PHYSICIAN ASSISTANT

## 2025-01-16 PROCEDURE — G8417 CALC BMI ABV UP PARAM F/U: HCPCS | Performed by: PHYSICIAN ASSISTANT

## 2025-01-16 PROCEDURE — 3077F SYST BP >= 140 MM HG: CPT | Performed by: PHYSICIAN ASSISTANT

## 2025-01-16 PROCEDURE — 1036F TOBACCO NON-USER: CPT | Performed by: PHYSICIAN ASSISTANT

## 2025-01-16 PROCEDURE — 3079F DIAST BP 80-89 MM HG: CPT | Performed by: PHYSICIAN ASSISTANT

## 2025-01-16 PROCEDURE — 99214 OFFICE O/P EST MOD 30 MIN: CPT | Performed by: PHYSICIAN ASSISTANT

## 2025-01-16 PROCEDURE — 1159F MED LIST DOCD IN RCRD: CPT | Performed by: PHYSICIAN ASSISTANT

## 2025-01-16 PROCEDURE — G8400 PT W/DXA NO RESULTS DOC: HCPCS | Performed by: PHYSICIAN ASSISTANT

## 2025-01-16 PROCEDURE — 1126F AMNT PAIN NOTED NONE PRSNT: CPT | Performed by: PHYSICIAN ASSISTANT

## 2025-01-16 RX ORDER — LOSARTAN POTASSIUM 25 MG/1
25 TABLET ORAL DAILY
Qty: 90 TABLET | Refills: 1 | Status: SHIPPED | OUTPATIENT
Start: 2025-01-16

## 2025-01-16 RX ORDER — ASPIRIN 81 MG/1
81 TABLET ORAL DAILY
Qty: 30 TABLET | Refills: 3 | Status: SHIPPED | OUTPATIENT
Start: 2025-01-16

## 2025-01-16 ASSESSMENT — PATIENT HEALTH QUESTIONNAIRE - PHQ9
SUM OF ALL RESPONSES TO PHQ QUESTIONS 1-9: 0
SUM OF ALL RESPONSES TO PHQ QUESTIONS 1-9: 0
SUM OF ALL RESPONSES TO PHQ9 QUESTIONS 1 & 2: 0
SUM OF ALL RESPONSES TO PHQ QUESTIONS 1-9: 0
1. LITTLE INTEREST OR PLEASURE IN DOING THINGS: NOT AT ALL
SUM OF ALL RESPONSES TO PHQ QUESTIONS 1-9: 0
2. FEELING DOWN, DEPRESSED OR HOPELESS: NOT AT ALL

## 2025-01-16 NOTE — PROGRESS NOTES
[facility]    Brynn Lyon is a 69 y.o. female, pmhx of CAD s/p CABG 2023, DM, HTN, hypothyroidism and HLD     Patient had a LHC in 02/2023 for symptoms concerning unstable angina.  Her left heart cath 02/2023 showed left main 50% stenosis along with RCA disease. Patient underwent CABG x3 with LIMA to LAD, SVG to OM and SVG to PDA at [facility] on 02/6/2023. Because of CP she underwent ECHO and NST at [facility] 06/2023 and per patient it was within normal limits and had increased EF.      Patient is here today for follow up. At her last appt she was started on amlodipine for BP control. She has since stopped taking because she felt that it was giving her \"chest pain\". Reports occasional palpitations, especially in  times of emotional stress without associated dizziness, near syncope or syncope. Denies CP, SOB, diaphoresis, N/V/D, weight gain, LE edema, orthopnea, PND.     Past Medical History:   Diagnosis Date    CAD (coronary artery disease)     Diabetes (HCC)     Fibromyalgia     HTN (hypertension)     Hypothyroid     Nausea & vomiting     Obesity     S/P CABG x 3 2/6/2023    CABG x3 (LIMA-LAD, rSVG-OM, rSVG-PDA) - Dr. Robin    Varicose veins of both lower extremities        Past Surgical History:   Procedure Laterality Date    CHOLECYSTECTOMY      COLONOSCOPY      age 50    COLONOSCOPY N/A 10/7/2020    COLONOSCOPY, biopsy, polypectomies performed by Richie Paige MD at [facility] ENDOSCOPY    CORONARY ARTERY BYPASS GRAFT  02/06/2023    DIAGNOSTIC CARDIAC CATH LAB PROCEDURE      GYN      colposcopy and portion cervix removed    HEENT      tonsillectomy    HERNIA REPAIR      abdominal       Current Outpatient Medications   Medication Sig    metoprolol tartrate (LOPRESSOR) 25 MG tablet Take 1 tablet by mouth 2 times daily    nitroGLYCERIN (NITROSTAT) 0.4 MG SL tablet Place 1 tablet under the tongue    aspirin 81 MG EC tablet Take 1 tablet by mouth daily    loratadine (CLARITIN) 10 MG tablet 1

## 2025-01-16 NOTE — PATIENT INSTRUCTIONS
Echo  PATIENT PREPS:   -Wear easy to remove shirt to your appointment for easier accessibility.    **call office 3-5 days after testing is completed for results** Please ensure testing is completed prior to scheduled follow up appointment. If it is not completed your appointment may be rescheduled**    New Medication/Medication Changes/Medication Refill  Stop amlodipine  Start losartan 25 mg tab daily   Refill aspirin 81 mg tab daily   **please allow 24-48 hrs for medication to be escribed to pharmacy** If you need any refills on medications please contact your pharmacy so that the request can be escribed to the provider for review seven to 10 days prior to being out of medication.    Labs  BMP x 2 weeks     *[facility], [address]  ( M - Thur 8am to 3:30pm.) - You must call to make an appointment for blood work at this site.   Contact :[phone]  *[facility] [address] [facility]  *[facility], [address] [place] [address]  *[facility], [facility]  *[facility], [address] [facility]

## 2025-03-03 ENCOUNTER — TELEPHONE (OUTPATIENT)
Age: 70
End: 2025-03-03

## 2025-03-03 NOTE — TELEPHONE ENCOUNTER
Patient completed and Echo on 2/18 and saw online some of her results were abnormal. Patient asking if someone could contact her to go over the results via phone. Patient can be reached at [phone].

## 2025-03-03 NOTE — TELEPHONE ENCOUNTER
Contacted pt at cell number. Two patient Identifiers confirmed. Informed pt per Dr Frias.  Pt verbalized understanding.

## 2025-06-19 ENCOUNTER — TELEPHONE (OUTPATIENT)
Age: 70
End: 2025-06-19

## 2025-06-19 DIAGNOSIS — E78.2 MIXED HYPERLIPIDEMIA: Primary | ICD-10-CM

## 2025-06-19 NOTE — TELEPHONE ENCOUNTER
Attempted to contact pt at  number, no answer. Lvm for pt on secure line to have labs competed. Informed pt to contact office if further clarification was needed at  [phone]. Will continue to try to contact pt.       Re: need to contact [phone] to schedule appt to have lipid collected

## 2025-06-19 NOTE — TELEPHONE ENCOUNTER
Received incoming vm from Noemi with [facility]. Two pt identifiers confirmed. Per Noemi pt is up for renewal and needs to have last OV note and lipid completed.

## 2025-06-27 ENCOUNTER — TELEPHONE (OUTPATIENT)
Age: 70
End: 2025-06-27

## 2025-06-27 DIAGNOSIS — E78.2 MIXED HYPERLIPIDEMIA: ICD-10-CM

## 2025-06-27 NOTE — TELEPHONE ENCOUNTER
Brynn Lyon is calling to get her lipid panel done, however the lab order hasn't been released.  She wants to get it done it Monday.            Preferred call back number ; [phone]

## 2025-07-17 ENCOUNTER — HOSPITAL ENCOUNTER (OUTPATIENT)
Facility: HOSPITAL | Age: 70
Setting detail: SPECIMEN
Discharge: HOME | End: 2025-07-20
Payer: MEDICARE

## 2025-07-17 ENCOUNTER — OFFICE VISIT (OUTPATIENT)
Age: 70
End: 2025-07-17
Payer: MEDICARE

## 2025-07-17 VITALS
HEART RATE: 65 BPM | SYSTOLIC BLOOD PRESSURE: 137 MMHG | HEIGHT: 59 IN | BODY MASS INDEX: 32.66 KG/M2 | OXYGEN SATURATION: 97 % | WEIGHT: 162 LBS | DIASTOLIC BLOOD PRESSURE: 85 MMHG

## 2025-07-17 DIAGNOSIS — I10 ESSENTIAL HYPERTENSION: Primary | ICD-10-CM

## 2025-07-17 DIAGNOSIS — I50.9 CONGESTIVE HEART FAILURE, UNSPECIFIED HF CHRONICITY, UNSPECIFIED HEART FAILURE TYPE (HCC): ICD-10-CM

## 2025-07-17 DIAGNOSIS — I10 ESSENTIAL HYPERTENSION WITH GOAL BLOOD PRESSURE LESS THAN 140/90: ICD-10-CM

## 2025-07-17 DIAGNOSIS — R01.1 HEART MURMUR: ICD-10-CM

## 2025-07-17 LAB
CHOLEST SERPL-MCNC: 141 MG/DL
HDLC SERPL-MCNC: 38 MG/DL (ref 40–60)
HDLC SERPL: 3.7 (ref 0–5)
LDLC SERPL CALC-MCNC: 65 MG/DL (ref 0–100)
TRIGL SERPL-MCNC: 190 MG/DL (ref 0–150)
VLDLC SERPL CALC-MCNC: 38 MG/DL

## 2025-07-17 PROCEDURE — 1123F ACP DISCUSS/DSCN MKR DOCD: CPT | Performed by: INTERNAL MEDICINE

## 2025-07-17 PROCEDURE — 3017F COLORECTAL CA SCREEN DOC REV: CPT | Performed by: INTERNAL MEDICINE

## 2025-07-17 PROCEDURE — 1090F PRES/ABSN URINE INCON ASSESS: CPT | Performed by: INTERNAL MEDICINE

## 2025-07-17 PROCEDURE — 99214 OFFICE O/P EST MOD 30 MIN: CPT | Performed by: INTERNAL MEDICINE

## 2025-07-17 PROCEDURE — 3079F DIAST BP 80-89 MM HG: CPT | Performed by: INTERNAL MEDICINE

## 2025-07-17 PROCEDURE — G8400 PT W/DXA NO RESULTS DOC: HCPCS | Performed by: INTERNAL MEDICINE

## 2025-07-17 PROCEDURE — 1126F AMNT PAIN NOTED NONE PRSNT: CPT | Performed by: INTERNAL MEDICINE

## 2025-07-17 PROCEDURE — G8417 CALC BMI ABV UP PARAM F/U: HCPCS | Performed by: INTERNAL MEDICINE

## 2025-07-17 PROCEDURE — 3075F SYST BP GE 130 - 139MM HG: CPT | Performed by: INTERNAL MEDICINE

## 2025-07-17 PROCEDURE — G8428 CUR MEDS NOT DOCUMENT: HCPCS | Performed by: INTERNAL MEDICINE

## 2025-07-17 PROCEDURE — 36415 COLL VENOUS BLD VENIPUNCTURE: CPT

## 2025-07-17 PROCEDURE — 1036F TOBACCO NON-USER: CPT | Performed by: INTERNAL MEDICINE

## 2025-07-17 PROCEDURE — 80061 LIPID PANEL: CPT

## 2025-07-17 PROCEDURE — 93000 ELECTROCARDIOGRAM COMPLETE: CPT | Performed by: INTERNAL MEDICINE

## 2025-07-17 RX ORDER — LOSARTAN POTASSIUM 50 MG/1
50 TABLET ORAL DAILY
Qty: 90 TABLET | Refills: 2 | Status: SHIPPED | OUTPATIENT
Start: 2025-07-17

## 2025-07-17 RX ORDER — OMEPRAZOLE 20 MG/1
CAPSULE, DELAYED RELEASE ORAL
COMMUNITY
Start: 2025-06-21

## 2025-07-17 NOTE — PROGRESS NOTES
Identified pt with two pt identifiers(name and ). Reviewed record in preparation for visit and have obtained necessary documentation.    Brynn Lyon presents today for   Chief Complaint   Patient presents with    Follow-up     LVM TO RESCHEDULE APPT-CAH  Return in 4 months (on 2025) for follow up med change, BMP and ECHO         Pt denies DIZZINESS, SOB, CHEST PAIN/ PRESSURE, FATIGUE/WEAKNESS, HEADACHES, SWELLING.             Brynn Lyon preferred language for health care discussion is english/other.    Personal Protective Equipment:   Personal Protective Equipment was used including: mask-surgical and hands-gloves. Patient was placed on no precaution(s). Patient was not masked.    Precautions:   Patient currently on None  Patient currently roomed with door closed.    Is someone accompanying this pt? no    Is the patient using any DME equipment during OV? no    Depression Screenin/16/2025     8:23 AM 2024     8:42 AM 3/9/2023    11:42 AM 2023     3:03 PM   PHQ-9 Questionaire   Little interest or pleasure in doing things 0 0 0 0   Feeling down, depressed, or hopeless 0 0 0 0   PHQ-9 Total Score 0 0 0 0        Learning Assessment:  Who is the primary learner? Patient    What is the preferred language for health care of the primary learner? ENGLISH    How does the primary learner prefer to learn new concepts? DEMONSTRATION    Answered By patient    Relationship to Learner SELF        Abuse Screenin/17/2025    11:00 AM 2025     8:00 AM 2024     8:00 AM 2024    11:00 AM 2023    11:00 AM 3/9/2023    11:00 AM   AMB Abuse Screening   Do you ever feel afraid of your partner? N N N N N N   Are you in a relationship with someone who physically or mentally threatens you? N N N N N N   Is it safe for you to go home? Y Y Y Y Y Y        Fall Risk      2025    11:27 AM 2025     8:23 AM 2024     8:42 AM 2024    11:24 AM 2023    11:19 AM 3/9/2023    11:42 
mouth once a week    levothyroxine (SYNTHROID) 112 MCG tablet Take 1 tablet by mouth Daily    acetaminophen (TYLENOL) 500 MG tablet Take 2 tablets by mouth every 6 hours as needed for Pain    EPINEPHrine (EPIPEN 2-ERIC) 0.3 MG/0.3ML SOAJ injection as directed    metFORMIN (GLUCOPHAGE) 1000 MG tablet Take 1 tablet by mouth 2 times daily (with meals)     No current facility-administered medications for this visit.       Allergies and Sensitivities:  Allergies   Allergen Reactions    Bee Venom Anaphylaxis    Prochlorperazine Anaphylaxis    Prochlorperazine Edisylate Angioedema    Nickel Itching and Rash       Family History:  Family History   Problem Relation Age of Onset    No Known Problems Mother     No Known Problems Father     No Known Problems Sister     No Known Problems Brother     No Known Problems Maternal Grandmother     No Known Problems Maternal Grandfather     No Known Problems Paternal Grandmother     No Known Problems Paternal Grandfather     No Known Problems Other        Social History:  Social History     Tobacco Use    Smoking status: Never    Smokeless tobacco: Never   Vaping Use    Vaping status: Unknown   Substance Use Topics    Alcohol use: Never    Drug use: Never     She  reports that she has never smoked. She has never used smokeless tobacco.  She  reports no history of alcohol use.    Review of Systems:  Cardiac symptoms as noted above in HPI. All others negative.        Physical Exam:  BP Readings from Last 3 Encounters:   07/17/25 137/85   02/18/25 (!) 169/83   01/16/25 (!) 169/83         Pulse Readings from Last 3 Encounters:   07/17/25 65   01/16/25 57   07/11/24 55          Wt Readings from Last 3 Encounters:   07/17/25 73.5 kg (162 lb)   02/18/25 70.8 kg (156 lb)   01/16/25 70.8 kg (156 lb)       Constitutional: Alert, non toxic, NAD    HENT: Head: Normocephalic and atraumatic. ENT: No ear discharge noted.    Eyes: Conjunctivae and extraocular motions are normal.   Neck: No JVD

## 2025-07-17 NOTE — PATIENT INSTRUCTIONS
Testing   Echo/ Nuclear Stress/ Treadmill Stress/ 24/48 HR Holter    Please call [facility] central scheduling at [phone]/ [facility] central scheduling at [phone] to schedule testing.     Echo  PATIENT PREPS:   -Wear easy to remove shirt to your appointment for easier accessibility.    Nuclear Stress Instructions-  PATIENT PREPS:   -NPO (Nothing to eat or drink) after midnight the night prior to the exam.    -No medications on the day of exam. You may bring them with you.   -Wear comfortable clothing and shoes suitable for walking on a treadmill. (NO sandals, flip flops, high heels, etc)   -The duration of this exam is approximately 2 to 4 hours.      **call office 3-5 days after testing is completed for results** Please ensure testing is completed prior to scheduled follow up appointment. If it is not completed your appointment may be rescheduled**    New Medication/Medication Changes/Medication Refill      **please allow 24-48 hrs for medication to be escribed to pharmacy** If you need any refills on medications please contact your pharmacy so that the request can be escribed to the provider for review seven to 10 days prior to being out of medication.    Labs      *[facility], [address]  ( M - Thur 8am to 3:30pm.) - You must call to make an appointment for blood work at this site.   Contact :[phone]  *[facility] [address] [facility]  *[facility], [address] [facility]  *[facility], [address] [facility]  *[facility], [address] [facility]      Other Testing/Referral/Follow Up

## (undated) DEVICE — SUTURE NONABSORBABLE MONOFILAMENT 6-0 C-1 1X30 IN PROLENE 8706H

## (undated) DEVICE — AIRLIFE™ ADULT OXYGEN MASK VINYL, UNDER THE CHIN STYLE, HIGH CONCENTRATION REBREATHER MASK (NO VALVES) WITH 7 FEET (2.1 M) CRUSH RESISTANT OXYGEN TUBING: Brand: AIRLIFE™ ADULT OXYGEN MASK VINYL, UNDER THE CHIN STYLE

## (undated) DEVICE — PROCEDURE KIT FLUID MGMT CUST MAINFOLD STRL

## (undated) DEVICE — INSERT SUT HLD F/OCTBS RETRCTR --

## (undated) DEVICE — LINER,SOFT,SUCTION CANISTER,1500CC: Brand: MEDLINE

## (undated) DEVICE — DECANTER BAG 9": Brand: MEDLINE INDUSTRIES, INC.

## (undated) DEVICE — ARGYLE FRAZIER SURGICAL SUCTION INSTRUMENT 8 FR/CH (2.7 MM): Brand: ARGYLE

## (undated) DEVICE — INTENDED FOR TISSUE SEPARATION, AND OTHER PROCEDURES THAT REQUIRE A SHARP SURGICAL BLADE TO PUNCTURE OR CUT.: Brand: BARD-PARKER ® CARBON RIB-BACK BLADES

## (undated) DEVICE — CATHETER L HRT VENT SIL 16 IN OVERALL LEN 20FR N VENT CONN

## (undated) DEVICE — Y BARBED CONNECTOR POLYPROPYLENE, LARGE: Brand: ARGYLE

## (undated) DEVICE — Device

## (undated) DEVICE — STERILE POLYISOPRENE POWDER-FREE SURGICAL GLOVES: Brand: PROTEXIS

## (undated) DEVICE — RADIFOCUS OPTITORQUE ANGIOGRAPHIC CATHETER: Brand: OPTITORQUE

## (undated) DEVICE — GDWIRE ANGIO DUO FLX 0.018X25 --

## (undated) DEVICE — 3M™ IOBAN™ 2 ANTIMICROBIAL INCISE DRAPE 6650EZ: Brand: IOBAN™ 2

## (undated) DEVICE — STABILIZER SURG TISS W/ CANSTR TBNG EVOLUTION OCTPS

## (undated) DEVICE — CATHETER ART 20 GAX4 CM 22 GA RADIAL FEP

## (undated) DEVICE — Device: Brand: RETRACT-O-TAPE 18G X 30.5CM BLUNT NEEDLE

## (undated) DEVICE — SUTURE ETHBND EXCEL SZ 0 L30IN NONABSORBABLE GRN CT1 L36MM X424H

## (undated) DEVICE — OPTIFOAM GENTLE LIQUITRAP, SACRUM, 9X9: Brand: MEDLINE

## (undated) DEVICE — FOGARTY SPRING CLIPS 6MM: Brand: FOGARTY SOFTJAW

## (undated) DEVICE — X-RAY SPONGES,12 PLY: Brand: DERMACEA

## (undated) DEVICE — GLOVE SURG SZ 8 L11.77IN FNGR THK9.8MIL STRW LTX POLYMER

## (undated) DEVICE — TRAP SPEC COLL POLYP POLYSTYR --

## (undated) DEVICE — TRAY CATHETER 16FR W URIN M STATLOK STBL DEV FOR LUBRI-SIL 2 TEMP

## (undated) DEVICE — GOWN,AURORA,FABRIC-REINFORCED,LARGE: Brand: MEDLINE

## (undated) DEVICE — DECANTER FLD 9IN ST BG FOR ASEP TRNSF OF FLD

## (undated) DEVICE — SET TRNQT STD 12FR TB LEN 7 IN 2 RED 2 BLU 2 CLR 16FR 2 L

## (undated) DEVICE — NEEDLE HYPO 25GA L0.625IN BLU POLYPR HUB S STL REG BVL STR

## (undated) DEVICE — APPLICATOR MEDICATED 26 CC SOLUTION HI LT ORNG CHLORAPREP

## (undated) DEVICE — MEDI-VAC TUBING CONNECTOR 5-IN-1 STRAIGHT: Brand: CARDINAL HEALTH

## (undated) DEVICE — Z INACTIVE - UOM CHANGE USE 2854051 - SPONGE DRN W4XL4IN RAYON/POLYESTER 6 PLY NONWOVEN PRECUT

## (undated) DEVICE — DRAIN SURG 24FR L5/16IN DIA8MM SIL RND HUBLESS FULL FLUT

## (undated) DEVICE — AVID DUAL STAGE VENOUS DRAINAGE CANNULA: Brand: AVID DUAL STAGE VENOUS DRAINAGE CANNULA

## (undated) DEVICE — BLOWER FLUID GAS MIX L165CM S STL SHFT MAL AND HNDPC

## (undated) DEVICE — SYSTEM ENDOSCP VES HARV W/ TOOL CANN SEAL SHT PRT BLNT TIP

## (undated) DEVICE — SHUNT CV L14MM DIA1.25MM IC BLB CLRVW
Type: IMPLANTABLE DEVICE | Site: CORONARY | Status: NON-FUNCTIONAL
Removed: 2023-02-06

## (undated) DEVICE — SUTURE NONABSORBABLE MONOFILAMENT 4-0 RB-1 36 IN BLU PROLENE 8557H

## (undated) DEVICE — KIT VASCULAR TOURNIQUET 5IN

## (undated) DEVICE — SOL IRR SOD CL 0.9% 1000ML BTL --

## (undated) DEVICE — YANKAUER,TAPERED BULBOUS TIP,W/O VENT: Brand: MEDLINE

## (undated) DEVICE — INTENDED FOR TISSUE SEPARATION, AND OTHER PROCEDURES THAT REQUIRE A SHARP SURGICAL BLADE TO PUNCTURE OR CUT.: Brand: BARD-PARKER ® STAINLESS STEEL BLADES

## (undated) DEVICE — SUT ETHBND 3-0 30IN RB1 DA GRN --

## (undated) DEVICE — PRESSURE MONITORING SET: Brand: TRUWAVE

## (undated) DEVICE — SET ANGIO L6.5IN L BOR 3 W STPCOCK SPIK TBNG

## (undated) DEVICE — GUIDEWIRE VASC L260CM DIA0035IN TIP L3MM PTFE J STD TAPR FIX

## (undated) DEVICE — SUTURE NRLN SZ 0 L18IN NONABSORBABLE BLK L36MM CT-1 1/2 CIR C521D

## (undated) DEVICE — ROTATING SURGICAL PUNCHES, 1 PER POUCH: Brand: A&E MEDICAL / ROTATING SURGICAL PUNCHES

## (undated) DEVICE — EZ GLIDE AORTIC CANNULA: Brand: EDWARDS LIFESCIENCES EZ GLIDE AORTIC CANNULA

## (undated) DEVICE — SWAN-GANZ CCOMBO V THERMODILUTION CATHETER: Brand: SWAN-GANZ CCOMBO V

## (undated) DEVICE — ELECTRODE BLDE L4IN NONINSULATED EDGE

## (undated) DEVICE — GLOVE SURG SZ 7 L11.33IN FNGR THK9.8MIL STRW LTX POLYMER

## (undated) DEVICE — NEEDLE SYR 18GA L1.5IN RED PLAS HUB S STL BLNT FILL W/O

## (undated) DEVICE — CATHETER DIAG AD L100CM DIA6FR STD JUDKINS L 4 POLYUR COR

## (undated) DEVICE — TOWEL SURG W16XL26IN WHT NONFENESTRATED ST 4 PER PK

## (undated) DEVICE — SOLUTION SCRB 4% CHG RED ANTIMIC SKIN CLN PREOPERATIVE DISP

## (undated) DEVICE — BARD® PTFE FELT PLEDGETS, (RECTANGLE), 4.8 MM X 6 MM: Brand: BARD® PTFE FELT PLEDGETS

## (undated) DEVICE — Z INACTIVE USE 2855128 SPONGE GZ 16 PLY WVN COT 4INX4IN  HHH

## (undated) DEVICE — HEART II: Brand: MEDLINE INDUSTRIES, INC.

## (undated) DEVICE — SUMP INTCARD W/ 1/4INCH CONN 20FRENCH 15INCH DLP

## (undated) DEVICE — GLIDESHEATH SLENDER STAINLESS STEEL KIT: Brand: GLIDESHEATH SLENDER

## (undated) DEVICE — CONN PERF Y 0.5X3/8X3/8IN --

## (undated) DEVICE — STANDARD HYPODERMIC NEEDLE,POLYPROPYLENE HUB: Brand: MONOJECT

## (undated) DEVICE — PROBE VASC 8MHZ WTRPRF

## (undated) DEVICE — TUBE SUCT 20FR RIG MACRO SUC

## (undated) DEVICE — CATH IV SAFE STR 22GX1IN BLU -- PROTECTIV PLUS

## (undated) DEVICE — SYRINGE MED 30ML STD CLR PLAS LUERLOCK TIP N CTRL DISP

## (undated) DEVICE — CANNULA PERF L2IN BLNT TIP 2MM VES CLR RADPQ BODY FEM LUER

## (undated) DEVICE — PAD,ABDOMINAL,8"X10",ST,LF: Brand: MEDLINE

## (undated) DEVICE — SUTURE VCRL SZ 0 L36IN ABSRB UD L36MM CT-1 1/2 CIR J946H

## (undated) DEVICE — SURGICAL PROCEDURE KIT LT HRT CUST

## (undated) DEVICE — PENCIL ES L3M BTTN SWCH S STL HEX LOK BLDE ELECTRD HOLSTER

## (undated) DEVICE — CONNECTOR TBNG W0.25XL0.375IN REDUC PLAS VLV BARB

## (undated) DEVICE — ELECTRODES QUIK COMBO RTS DEFIB

## (undated) DEVICE — AGENT HEMSTAT W4XL8IN OXIDIZED REGENERATED CELOS ABSRB

## (undated) DEVICE — CATH KT RAD ART 20GX1.75IN --

## (undated) DEVICE — ADAPTER CARDPLG SET MGMT FEM LUER W/ CLR CODE CLMP DLP

## (undated) DEVICE — SUTURE PROL SZ 7-0 L24IN NONABSORBABLE BLU L8MM BV175-6 3/8 8735H

## (undated) DEVICE — INSULATED BLADE ELECTRODE: Brand: EDGE

## (undated) DEVICE — GOWN,SIRUS,NONRNF,SETINSLV,XL,20/CS: Brand: MEDLINE

## (undated) DEVICE — REM POLYHESIVE ADULT PATIENT RETURN ELECTRODE: Brand: VALLEYLAB

## (undated) DEVICE — SUTURE MCRYL SZ 4 0 L18IN ABSRB VLT PS 1 L24MM 3 8 CIR REV Y682H

## (undated) DEVICE — BAND RADIAL COMPR ARTERY 24CM -- REG BX/10

## (undated) DEVICE — FORCEPS BX L240CM JAW DIA2.8MM L CAP W/ NDL MIC MESH TOOTH

## (undated) DEVICE — SUTURE ABSORBABLE BRAIDED 2-0 CT-1 27 IN UD VICRYL J259H

## (undated) DEVICE — CANNULA PERF 22FR L305CM W 3 8IN ART BLNT TIP HI FLOW VENT

## (undated) DEVICE — Device: Brand: DISPOSABLE ELECTROSURGICAL SNARE

## (undated) DEVICE — STANDARD SURGICAL GOWN, L: Brand: CONVERTORS

## (undated) DEVICE — 22G X 7"(178MM)PLASTIC HUBWITH WINGS, CALIBRATIONS: Brand: TUOHY EPIDURAL NEEDLE

## (undated) DEVICE — SUTURE PROL SZ 4-0 L30IN NONABSORBABLE BLU SH-1 L22MM 1/2 8526H